# Patient Record
Sex: FEMALE | Race: WHITE | Employment: OTHER | URBAN - NONMETROPOLITAN AREA
[De-identification: names, ages, dates, MRNs, and addresses within clinical notes are randomized per-mention and may not be internally consistent; named-entity substitution may affect disease eponyms.]

---

## 2019-01-01 ENCOUNTER — APPOINTMENT (OUTPATIENT)
Dept: GENERAL RADIOLOGY | Age: 64
DRG: 207 | End: 2019-01-01
Attending: FAMILY MEDICINE
Payer: MEDICARE

## 2019-01-01 ENCOUNTER — APPOINTMENT (OUTPATIENT)
Dept: CT IMAGING | Age: 64
DRG: 207 | End: 2019-01-01
Attending: FAMILY MEDICINE
Payer: MEDICARE

## 2019-01-01 ENCOUNTER — HOSPITAL ENCOUNTER (INPATIENT)
Age: 64
LOS: 15 days | Discharge: HOSPICE | DRG: 207 | End: 2019-06-01
Attending: FAMILY MEDICINE | Admitting: FAMILY MEDICINE
Payer: MEDICARE

## 2019-01-01 VITALS
RESPIRATION RATE: 22 BRPM | TEMPERATURE: 99.7 F | HEART RATE: 91 BPM | BODY MASS INDEX: 32.55 KG/M2 | OXYGEN SATURATION: 94 % | WEIGHT: 172.4 LBS | DIASTOLIC BLOOD PRESSURE: 71 MMHG | SYSTOLIC BLOOD PRESSURE: 114 MMHG | HEIGHT: 61 IN

## 2019-01-01 DIAGNOSIS — F51.01 PRIMARY INSOMNIA: ICD-10-CM

## 2019-01-01 DIAGNOSIS — Z51.5 PALLIATIVE CARE PATIENT: Primary | ICD-10-CM

## 2019-01-01 LAB
ALBUMIN SERPL-MCNC: 3.1 G/DL (ref 3.5–5.2)
ALP BLD-CCNC: 66 U/L (ref 35–104)
ALPHA-1 ANTITRYPSIN PHENOTYPE: NORMAL
ALPHA-1 ANTITRYPSIN: 149 MG/DL (ref 90–200)
ALT SERPL-CCNC: 8 U/L (ref 5–33)
ANA IGG, ELISA: NORMAL
ANCA IFA: NORMAL
ANION GAP SERPL CALCULATED.3IONS-SCNC: 10 MMOL/L (ref 7–19)
ANION GAP SERPL CALCULATED.3IONS-SCNC: 11 MMOL/L (ref 7–19)
ANION GAP SERPL CALCULATED.3IONS-SCNC: 13 MMOL/L (ref 7–19)
ANION GAP SERPL CALCULATED.3IONS-SCNC: 4 MMOL/L (ref 7–19)
ANION GAP SERPL CALCULATED.3IONS-SCNC: 5 MMOL/L (ref 7–19)
ANION GAP SERPL CALCULATED.3IONS-SCNC: 7 MMOL/L (ref 7–19)
ANION GAP SERPL CALCULATED.3IONS-SCNC: 8 MMOL/L (ref 7–19)
ANION GAP SERPL CALCULATED.3IONS-SCNC: 8 MMOL/L (ref 7–19)
ANION GAP SERPL CALCULATED.3IONS-SCNC: 9 MMOL/L (ref 7–19)
AST SERPL-CCNC: 21 U/L (ref 5–32)
BASE EXCESS ARTERIAL: 1.3 MMOL/L (ref -2–2)
BASE EXCESS ARTERIAL: 10.4 MMOL/L (ref -2–2)
BASE EXCESS ARTERIAL: 10.6 MMOL/L (ref -2–2)
BASE EXCESS ARTERIAL: 11.6 MMOL/L (ref -2–2)
BASE EXCESS ARTERIAL: 3.9 MMOL/L (ref -2–2)
BASE EXCESS ARTERIAL: 4.4 MMOL/L (ref -2–2)
BASE EXCESS ARTERIAL: 6.5 MMOL/L (ref -2–2)
BASE EXCESS ARTERIAL: 7.2 MMOL/L (ref -2–2)
BASE EXCESS ARTERIAL: 8.5 MMOL/L (ref -2–2)
BASE EXCESS ARTERIAL: 9.6 MMOL/L (ref -2–2)
BASOPHILS ABSOLUTE: 0 K/UL (ref 0–0.2)
BASOPHILS ABSOLUTE: 0.1 K/UL (ref 0–0.2)
BASOPHILS RELATIVE PERCENT: 0.1 % (ref 0–1)
BASOPHILS RELATIVE PERCENT: 0.2 % (ref 0–1)
BASOPHILS RELATIVE PERCENT: 0.2 % (ref 0–1)
BASOPHILS RELATIVE PERCENT: 0.3 % (ref 0–1)
BASOPHILS RELATIVE PERCENT: 0.4 % (ref 0–1)
BASOPHILS RELATIVE PERCENT: 0.5 % (ref 0–1)
BASOPHILS RELATIVE PERCENT: 0.6 % (ref 0–1)
BILIRUB SERPL-MCNC: 1 MG/DL (ref 0.2–1.2)
BLOOD CULTURE, ROUTINE: ABNORMAL
BLOOD CULTURE, ROUTINE: ABNORMAL
BLOOD CULTURE, ROUTINE: NORMAL
BLOOD CULTURE, ROUTINE: NORMAL
BUN BLDV-MCNC: 16 MG/DL (ref 8–23)
BUN BLDV-MCNC: 17 MG/DL (ref 8–23)
BUN BLDV-MCNC: 17 MG/DL (ref 8–23)
BUN BLDV-MCNC: 20 MG/DL (ref 8–23)
BUN BLDV-MCNC: 21 MG/DL (ref 8–23)
BUN BLDV-MCNC: 23 MG/DL (ref 8–23)
BUN BLDV-MCNC: 24 MG/DL (ref 8–23)
BUN BLDV-MCNC: 26 MG/DL (ref 8–23)
CALCIUM SERPL-MCNC: 7.9 MG/DL (ref 8.8–10.2)
CALCIUM SERPL-MCNC: 8 MG/DL (ref 8.8–10.2)
CALCIUM SERPL-MCNC: 8 MG/DL (ref 8.8–10.2)
CALCIUM SERPL-MCNC: 8.1 MG/DL (ref 8.8–10.2)
CALCIUM SERPL-MCNC: 8.2 MG/DL (ref 8.8–10.2)
CALCIUM SERPL-MCNC: 8.3 MG/DL (ref 8.8–10.2)
CALCIUM SERPL-MCNC: 8.4 MG/DL (ref 8.8–10.2)
CALCIUM SERPL-MCNC: 8.5 MG/DL (ref 8.8–10.2)
CALCIUM SERPL-MCNC: 8.5 MG/DL (ref 8.8–10.2)
CALCIUM SERPL-MCNC: 8.6 MG/DL (ref 8.8–10.2)
CARBOXYHEMOGLOBIN ARTERIAL: 1.4 % (ref 0–5)
CARBOXYHEMOGLOBIN ARTERIAL: 1.5 % (ref 0–5)
CARBOXYHEMOGLOBIN ARTERIAL: 1.6 % (ref 0–5)
CARBOXYHEMOGLOBIN ARTERIAL: 1.7 % (ref 0–5)
CARBOXYHEMOGLOBIN ARTERIAL: 1.8 % (ref 0–5)
CARBOXYHEMOGLOBIN ARTERIAL: 1.8 % (ref 0–5)
CARBOXYHEMOGLOBIN ARTERIAL: 2.6 % (ref 0–5)
CARBOXYHEMOGLOBIN ARTERIAL: 2.6 % (ref 0–5)
CCP IGG ANTIBODIES: 3 UNITS (ref 0–19)
CHLORIDE BLD-SCNC: 101 MMOL/L (ref 98–111)
CHLORIDE BLD-SCNC: 102 MMOL/L (ref 98–111)
CHLORIDE BLD-SCNC: 103 MMOL/L (ref 98–111)
CHLORIDE BLD-SCNC: 103 MMOL/L (ref 98–111)
CHLORIDE BLD-SCNC: 104 MMOL/L (ref 98–111)
CHLORIDE BLD-SCNC: 105 MMOL/L (ref 98–111)
CHLORIDE BLD-SCNC: 107 MMOL/L (ref 98–111)
CHLORIDE BLD-SCNC: 108 MMOL/L (ref 98–111)
CHLORIDE BLD-SCNC: 108 MMOL/L (ref 98–111)
CHLORIDE BLD-SCNC: 109 MMOL/L (ref 98–111)
CHLORIDE BLD-SCNC: 111 MMOL/L (ref 98–111)
CO2: 25 MMOL/L (ref 22–29)
CO2: 26 MMOL/L (ref 22–29)
CO2: 26 MMOL/L (ref 22–29)
CO2: 27 MMOL/L (ref 22–29)
CO2: 28 MMOL/L (ref 22–29)
CO2: 29 MMOL/L (ref 22–29)
CO2: 30 MMOL/L (ref 22–29)
CO2: 31 MMOL/L (ref 22–29)
CO2: 31 MMOL/L (ref 22–29)
CO2: 32 MMOL/L (ref 22–29)
CO2: 32 MMOL/L (ref 22–29)
CO2: 33 MMOL/L (ref 22–29)
CORTISOL TOTAL: 30.7 UG/DL
CREAT SERPL-MCNC: 0.5 MG/DL (ref 0.5–0.9)
CREAT SERPL-MCNC: 0.6 MG/DL (ref 0.5–0.9)
CREAT SERPL-MCNC: 0.7 MG/DL (ref 0.5–0.9)
CREAT SERPL-MCNC: 0.8 MG/DL (ref 0.5–0.9)
CULTURE, BLOOD 2: ABNORMAL
CULTURE, BLOOD 2: ABNORMAL
CULTURE, BLOOD 2: NORMAL
CULTURE, BLOOD 2: NORMAL
CULTURE, RESPIRATORY: NORMAL
EOSINOPHILS ABSOLUTE: 0 K/UL (ref 0–0.6)
EOSINOPHILS ABSOLUTE: 0.1 K/UL (ref 0–0.6)
EOSINOPHILS ABSOLUTE: 0.2 K/UL (ref 0–0.6)
EOSINOPHILS RELATIVE PERCENT: 0 % (ref 0–5)
EOSINOPHILS RELATIVE PERCENT: 0.1 % (ref 0–5)
EOSINOPHILS RELATIVE PERCENT: 0.2 % (ref 0–5)
EOSINOPHILS RELATIVE PERCENT: 0.3 % (ref 0–5)
EOSINOPHILS RELATIVE PERCENT: 0.5 % (ref 0–5)
EOSINOPHILS RELATIVE PERCENT: 0.5 % (ref 0–5)
EOSINOPHILS RELATIVE PERCENT: 1.2 % (ref 0–5)
GFR NON-AFRICAN AMERICAN: >60
GLUCOSE BLD-MCNC: 112 MG/DL (ref 74–109)
GLUCOSE BLD-MCNC: 113 MG/DL (ref 74–109)
GLUCOSE BLD-MCNC: 114 MG/DL (ref 74–109)
GLUCOSE BLD-MCNC: 115 MG/DL (ref 74–109)
GLUCOSE BLD-MCNC: 118 MG/DL (ref 74–109)
GLUCOSE BLD-MCNC: 120 MG/DL (ref 74–109)
GLUCOSE BLD-MCNC: 123 MG/DL (ref 74–109)
GLUCOSE BLD-MCNC: 124 MG/DL (ref 74–109)
GLUCOSE BLD-MCNC: 126 MG/DL (ref 74–109)
GLUCOSE BLD-MCNC: 132 MG/DL (ref 74–109)
GLUCOSE BLD-MCNC: 134 MG/DL (ref 74–109)
GLUCOSE BLD-MCNC: 144 MG/DL (ref 74–109)
GLUCOSE BLD-MCNC: 146 MG/DL (ref 74–109)
GLUCOSE BLD-MCNC: 84 MG/DL (ref 74–109)
GLUCOSE BLD-MCNC: 99 MG/DL (ref 74–109)
GRAM STAIN RESULT: NORMAL
HCO3 ARTERIAL: 26.6 MMOL/L (ref 22–26)
HCO3 ARTERIAL: 28.5 MMOL/L (ref 22–26)
HCO3 ARTERIAL: 29.8 MMOL/L (ref 22–26)
HCO3 ARTERIAL: 31.2 MMOL/L (ref 22–26)
HCO3 ARTERIAL: 31.3 MMOL/L (ref 22–26)
HCO3 ARTERIAL: 34 MMOL/L (ref 22–26)
HCO3 ARTERIAL: 34.3 MMOL/L (ref 22–26)
HCO3 ARTERIAL: 34.3 MMOL/L (ref 22–26)
HCO3 ARTERIAL: 37 MMOL/L (ref 22–26)
HCO3 ARTERIAL: 37.1 MMOL/L (ref 22–26)
HCT VFR BLD CALC: 32.2 % (ref 37–47)
HCT VFR BLD CALC: 32.4 % (ref 37–47)
HCT VFR BLD CALC: 32.7 % (ref 37–47)
HCT VFR BLD CALC: 32.9 % (ref 37–47)
HCT VFR BLD CALC: 33.9 % (ref 37–47)
HCT VFR BLD CALC: 35.1 % (ref 37–47)
HCT VFR BLD CALC: 35.2 % (ref 37–47)
HCT VFR BLD CALC: 35.3 % (ref 37–47)
HCT VFR BLD CALC: 35.4 % (ref 37–47)
HCT VFR BLD CALC: 35.5 % (ref 37–47)
HCT VFR BLD CALC: 35.8 % (ref 37–47)
HCT VFR BLD CALC: 36 % (ref 37–47)
HCT VFR BLD CALC: 37.4 % (ref 37–47)
HCT VFR BLD CALC: 38 % (ref 37–47)
HEMOGLOBIN, ART, EXTENDED: 11.5 G/DL (ref 12–16)
HEMOGLOBIN, ART, EXTENDED: 11.5 G/DL (ref 12–16)
HEMOGLOBIN, ART, EXTENDED: 11.6 G/DL (ref 12–16)
HEMOGLOBIN, ART, EXTENDED: 11.7 G/DL (ref 12–16)
HEMOGLOBIN, ART, EXTENDED: 11.8 G/DL (ref 12–16)
HEMOGLOBIN, ART, EXTENDED: 11.9 G/DL (ref 12–16)
HEMOGLOBIN, ART, EXTENDED: 12 G/DL (ref 12–16)
HEMOGLOBIN, ART, EXTENDED: 12.1 G/DL (ref 12–16)
HEMOGLOBIN, ART, EXTENDED: 12.3 G/DL (ref 12–16)
HEMOGLOBIN, ART, EXTENDED: 12.5 G/DL (ref 12–16)
HEMOGLOBIN: 10.1 G/DL (ref 12–16)
HEMOGLOBIN: 10.3 G/DL (ref 12–16)
HEMOGLOBIN: 10.4 G/DL (ref 12–16)
HEMOGLOBIN: 10.6 G/DL (ref 12–16)
HEMOGLOBIN: 11 G/DL (ref 12–16)
HEMOGLOBIN: 11 G/DL (ref 12–16)
HEMOGLOBIN: 11.1 G/DL (ref 12–16)
HEMOGLOBIN: 11.2 G/DL (ref 12–16)
HEMOGLOBIN: 11.3 G/DL (ref 12–16)
HEMOGLOBIN: 11.6 G/DL (ref 12–16)
HEMOGLOBIN: 11.8 G/DL (ref 12–16)
HEMOGLOBIN: 9.9 G/DL (ref 12–16)
IGA: 172 MG/DL (ref 70–400)
IGE: 4 KU/L
IGG 1: 420 MG/DL (ref 240–1118)
IGG 2: 136 MG/DL (ref 124–549)
IGG 3: 32 MG/DL (ref 21–134)
IGG 4: 31 MG/DL (ref 1–123)
IGG: 593 MG/DL (ref 700–1600)
IGM: 70 MG/DL (ref 40–230)
LACTIC ACID, SEPSIS: 1.2 MG/DL (ref 0.5–1.9)
LV EF: 58 %
LVEF MODALITY: NORMAL
LYMPHOCYTES ABSOLUTE: 0.3 K/UL (ref 1.1–4.5)
LYMPHOCYTES ABSOLUTE: 0.3 K/UL (ref 1.1–4.5)
LYMPHOCYTES ABSOLUTE: 0.4 K/UL (ref 1.1–4.5)
LYMPHOCYTES ABSOLUTE: 0.5 K/UL (ref 1.1–4.5)
LYMPHOCYTES ABSOLUTE: 0.5 K/UL (ref 1.1–4.5)
LYMPHOCYTES ABSOLUTE: 0.6 K/UL (ref 1.1–4.5)
LYMPHOCYTES ABSOLUTE: 0.7 K/UL (ref 1.1–4.5)
LYMPHOCYTES ABSOLUTE: 0.7 K/UL (ref 1.1–4.5)
LYMPHOCYTES ABSOLUTE: 1 K/UL (ref 1.1–4.5)
LYMPHOCYTES ABSOLUTE: 1.4 K/UL (ref 1.1–4.5)
LYMPHOCYTES RELATIVE PERCENT: 11.5 % (ref 20–40)
LYMPHOCYTES RELATIVE PERCENT: 2.3 % (ref 20–40)
LYMPHOCYTES RELATIVE PERCENT: 2.9 % (ref 20–40)
LYMPHOCYTES RELATIVE PERCENT: 3.4 % (ref 20–40)
LYMPHOCYTES RELATIVE PERCENT: 4.3 % (ref 20–40)
LYMPHOCYTES RELATIVE PERCENT: 4.3 % (ref 20–40)
LYMPHOCYTES RELATIVE PERCENT: 4.5 % (ref 20–40)
LYMPHOCYTES RELATIVE PERCENT: 5 % (ref 20–40)
LYMPHOCYTES RELATIVE PERCENT: 5.2 % (ref 20–40)
LYMPHOCYTES RELATIVE PERCENT: 5.4 % (ref 20–40)
LYMPHOCYTES RELATIVE PERCENT: 5.9 % (ref 20–40)
LYMPHOCYTES RELATIVE PERCENT: 6.2 % (ref 20–40)
LYMPHOCYTES RELATIVE PERCENT: 6.7 % (ref 20–40)
LYMPHOCYTES RELATIVE PERCENT: 7.4 % (ref 20–40)
MAGNESIUM: 2.5 MG/DL (ref 1.6–2.4)
MAGNESIUM: 2.7 MG/DL (ref 1.6–2.4)
MCH RBC QN AUTO: 29 PG (ref 27–31)
MCH RBC QN AUTO: 29.1 PG (ref 27–31)
MCH RBC QN AUTO: 29.2 PG (ref 27–31)
MCH RBC QN AUTO: 29.4 PG (ref 27–31)
MCH RBC QN AUTO: 29.4 PG (ref 27–31)
MCH RBC QN AUTO: 29.5 PG (ref 27–31)
MCH RBC QN AUTO: 29.7 PG (ref 27–31)
MCH RBC QN AUTO: 29.8 PG (ref 27–31)
MCH RBC QN AUTO: 29.9 PG (ref 27–31)
MCH RBC QN AUTO: 30 PG (ref 27–31)
MCH RBC QN AUTO: 30.1 PG (ref 27–31)
MCH RBC QN AUTO: 30.4 PG (ref 27–31)
MCHC RBC AUTO-ENTMCNC: 30.7 G/DL (ref 33–37)
MCHC RBC AUTO-ENTMCNC: 30.7 G/DL (ref 33–37)
MCHC RBC AUTO-ENTMCNC: 30.9 G/DL (ref 33–37)
MCHC RBC AUTO-ENTMCNC: 31 G/DL (ref 33–37)
MCHC RBC AUTO-ENTMCNC: 31.1 G/DL (ref 33–37)
MCHC RBC AUTO-ENTMCNC: 31.1 G/DL (ref 33–37)
MCHC RBC AUTO-ENTMCNC: 31.3 G/DL (ref 33–37)
MCHC RBC AUTO-ENTMCNC: 31.3 G/DL (ref 33–37)
MCHC RBC AUTO-ENTMCNC: 31.4 G/DL (ref 33–37)
MCHC RBC AUTO-ENTMCNC: 31.5 G/DL (ref 33–37)
MCHC RBC AUTO-ENTMCNC: 31.6 G/DL (ref 33–37)
MCHC RBC AUTO-ENTMCNC: 31.6 G/DL (ref 33–37)
MCHC RBC AUTO-ENTMCNC: 31.8 G/DL (ref 33–37)
MCHC RBC AUTO-ENTMCNC: 32.1 G/DL (ref 33–37)
MCV RBC AUTO: 93.5 FL (ref 81–99)
MCV RBC AUTO: 93.6 FL (ref 81–99)
MCV RBC AUTO: 93.6 FL (ref 81–99)
MCV RBC AUTO: 93.9 FL (ref 81–99)
MCV RBC AUTO: 94 FL (ref 81–99)
MCV RBC AUTO: 94.3 FL (ref 81–99)
MCV RBC AUTO: 94.4 FL (ref 81–99)
MCV RBC AUTO: 94.9 FL (ref 81–99)
MCV RBC AUTO: 94.9 FL (ref 81–99)
MCV RBC AUTO: 95.1 FL (ref 81–99)
MCV RBC AUTO: 95.1 FL (ref 81–99)
MCV RBC AUTO: 95.6 FL (ref 81–99)
MCV RBC AUTO: 95.8 FL (ref 81–99)
MCV RBC AUTO: 97 FL (ref 81–99)
METHEMOGLOBIN ARTERIAL: 0.8 %
METHEMOGLOBIN ARTERIAL: 0.9 %
METHEMOGLOBIN ARTERIAL: 1 %
METHEMOGLOBIN ARTERIAL: 1.1 %
METHEMOGLOBIN ARTERIAL: 1.2 %
METHEMOGLOBIN ARTERIAL: 1.3 %
METHEMOGLOBIN ARTERIAL: 1.4 %
MONOCYTES ABSOLUTE: 0.2 K/UL (ref 0–0.9)
MONOCYTES ABSOLUTE: 0.5 K/UL (ref 0–0.9)
MONOCYTES ABSOLUTE: 0.5 K/UL (ref 0–0.9)
MONOCYTES ABSOLUTE: 0.6 K/UL (ref 0–0.9)
MONOCYTES ABSOLUTE: 0.7 K/UL (ref 0–0.9)
MONOCYTES ABSOLUTE: 0.7 K/UL (ref 0–0.9)
MONOCYTES ABSOLUTE: 0.8 K/UL (ref 0–0.9)
MONOCYTES ABSOLUTE: 0.9 K/UL (ref 0–0.9)
MONOCYTES ABSOLUTE: 1.1 K/UL (ref 0–0.9)
MONOCYTES RELATIVE PERCENT: 2.5 % (ref 0–10)
MONOCYTES RELATIVE PERCENT: 4.6 % (ref 0–10)
MONOCYTES RELATIVE PERCENT: 4.8 % (ref 0–10)
MONOCYTES RELATIVE PERCENT: 5.9 % (ref 0–10)
MONOCYTES RELATIVE PERCENT: 5.9 % (ref 0–10)
MONOCYTES RELATIVE PERCENT: 6 % (ref 0–10)
MONOCYTES RELATIVE PERCENT: 6.4 % (ref 0–10)
MONOCYTES RELATIVE PERCENT: 6.6 % (ref 0–10)
MONOCYTES RELATIVE PERCENT: 7 % (ref 0–10)
MONOCYTES RELATIVE PERCENT: 7.3 % (ref 0–10)
MONOCYTES RELATIVE PERCENT: 8.2 % (ref 0–10)
MONOCYTES RELATIVE PERCENT: 9.8 % (ref 0–10)
NEUTROPHILS ABSOLUTE: 10.5 K/UL (ref 1.5–7.5)
NEUTROPHILS ABSOLUTE: 10.9 K/UL (ref 1.5–7.5)
NEUTROPHILS ABSOLUTE: 11.5 K/UL (ref 1.5–7.5)
NEUTROPHILS ABSOLUTE: 12 K/UL (ref 1.5–7.5)
NEUTROPHILS ABSOLUTE: 5.3 K/UL (ref 1.5–7.5)
NEUTROPHILS ABSOLUTE: 6.5 K/UL (ref 1.5–7.5)
NEUTROPHILS ABSOLUTE: 6.6 K/UL (ref 1.5–7.5)
NEUTROPHILS ABSOLUTE: 7.4 K/UL (ref 1.5–7.5)
NEUTROPHILS ABSOLUTE: 7.6 K/UL (ref 1.5–7.5)
NEUTROPHILS ABSOLUTE: 7.9 K/UL (ref 1.5–7.5)
NEUTROPHILS ABSOLUTE: 9 K/UL (ref 1.5–7.5)
NEUTROPHILS ABSOLUTE: 9.2 K/UL (ref 1.5–7.5)
NEUTROPHILS ABSOLUTE: 9.3 K/UL (ref 1.5–7.5)
NEUTROPHILS ABSOLUTE: 9.7 K/UL (ref 1.5–7.5)
NEUTROPHILS RELATIVE PERCENT: 74.5 % (ref 50–65)
NEUTROPHILS RELATIVE PERCENT: 75.6 % (ref 50–65)
NEUTROPHILS RELATIVE PERCENT: 77.2 % (ref 50–65)
NEUTROPHILS RELATIVE PERCENT: 79.3 % (ref 50–65)
NEUTROPHILS RELATIVE PERCENT: 79.6 % (ref 50–65)
NEUTROPHILS RELATIVE PERCENT: 79.9 % (ref 50–65)
NEUTROPHILS RELATIVE PERCENT: 82 % (ref 50–65)
NEUTROPHILS RELATIVE PERCENT: 82.6 % (ref 50–65)
NEUTROPHILS RELATIVE PERCENT: 84.9 % (ref 50–65)
NEUTROPHILS RELATIVE PERCENT: 86.4 % (ref 50–65)
NEUTROPHILS RELATIVE PERCENT: 87.7 % (ref 50–65)
NEUTROPHILS RELATIVE PERCENT: 88.8 % (ref 50–65)
NEUTROPHILS RELATIVE PERCENT: 90.4 % (ref 50–65)
NEUTROPHILS RELATIVE PERCENT: 91.7 % (ref 50–65)
O2 CONTENT ARTERIAL: 14 ML/DL
O2 CONTENT ARTERIAL: 14.7 ML/DL
O2 CONTENT ARTERIAL: 14.8 ML/DL
O2 CONTENT ARTERIAL: 15.6 ML/DL
O2 CONTENT ARTERIAL: 15.6 ML/DL
O2 CONTENT ARTERIAL: 15.7 ML/DL
O2 CONTENT ARTERIAL: 15.7 ML/DL
O2 CONTENT ARTERIAL: 16 ML/DL
O2 CONTENT ARTERIAL: 16 ML/DL
O2 CONTENT ARTERIAL: 17 ML/DL
O2 SAT, ARTERIAL: 85.4 %
O2 SAT, ARTERIAL: 87.6 %
O2 SAT, ARTERIAL: 91.1 %
O2 SAT, ARTERIAL: 91.1 %
O2 SAT, ARTERIAL: 92.4 %
O2 SAT, ARTERIAL: 94.4 %
O2 SAT, ARTERIAL: 94.9 %
O2 SAT, ARTERIAL: 95.3 %
O2 SAT, ARTERIAL: 95.5 %
O2 SAT, ARTERIAL: 96.8 %
O2 THERAPY: ABNORMAL
ORGANISM: ABNORMAL
ORGANISM: ABNORMAL
PCO2 ARTERIAL: 41 MMHG (ref 35–45)
PCO2 ARTERIAL: 42 MMHG (ref 35–45)
PCO2 ARTERIAL: 42 MMHG (ref 35–45)
PCO2 ARTERIAL: 44 MMHG (ref 35–45)
PCO2 ARTERIAL: 45 MMHG (ref 35–45)
PCO2 ARTERIAL: 46 MMHG (ref 35–45)
PCO2 ARTERIAL: 47 MMHG (ref 35–45)
PCO2 ARTERIAL: 50 MMHG (ref 35–45)
PCO2 ARTERIAL: 51 MMHG (ref 35–45)
PCO2 ARTERIAL: 57 MMHG (ref 35–45)
PDW BLD-RTO: 15 % (ref 11.5–14.5)
PDW BLD-RTO: 15.1 % (ref 11.5–14.5)
PDW BLD-RTO: 15.2 % (ref 11.5–14.5)
PDW BLD-RTO: 15.3 % (ref 11.5–14.5)
PDW BLD-RTO: 15.4 % (ref 11.5–14.5)
PDW BLD-RTO: 15.5 % (ref 11.5–14.5)
PDW BLD-RTO: 15.7 % (ref 11.5–14.5)
PH ARTERIAL: 7.39 (ref 7.35–7.45)
PH ARTERIAL: 7.41 (ref 7.35–7.45)
PH ARTERIAL: 7.42 (ref 7.35–7.45)
PH ARTERIAL: 7.44 (ref 7.35–7.45)
PH ARTERIAL: 7.44 (ref 7.35–7.45)
PH ARTERIAL: 7.45 (ref 7.35–7.45)
PH ARTERIAL: 7.47 (ref 7.35–7.45)
PH ARTERIAL: 7.48 (ref 7.35–7.45)
PH ARTERIAL: 7.49 (ref 7.35–7.45)
PH ARTERIAL: 7.52 (ref 7.35–7.45)
PLATELET # BLD: 137 K/UL (ref 130–400)
PLATELET # BLD: 149 K/UL (ref 130–400)
PLATELET # BLD: 158 K/UL (ref 130–400)
PLATELET # BLD: 166 K/UL (ref 130–400)
PLATELET # BLD: 166 K/UL (ref 130–400)
PLATELET # BLD: 168 K/UL (ref 130–400)
PLATELET # BLD: 172 K/UL (ref 130–400)
PLATELET # BLD: 179 K/UL (ref 130–400)
PLATELET # BLD: 190 K/UL (ref 130–400)
PLATELET # BLD: 192 K/UL (ref 130–400)
PLATELET # BLD: 202 K/UL (ref 130–400)
PLATELET # BLD: 206 K/UL (ref 130–400)
PLATELET # BLD: 206 K/UL (ref 130–400)
PLATELET # BLD: 207 K/UL (ref 130–400)
PMV BLD AUTO: 10.6 FL (ref 9.4–12.3)
PMV BLD AUTO: 10.6 FL (ref 9.4–12.3)
PMV BLD AUTO: 10.7 FL (ref 9.4–12.3)
PMV BLD AUTO: 10.8 FL (ref 9.4–12.3)
PMV BLD AUTO: 10.9 FL (ref 9.4–12.3)
PMV BLD AUTO: 10.9 FL (ref 9.4–12.3)
PMV BLD AUTO: 11 FL (ref 9.4–12.3)
PMV BLD AUTO: 11 FL (ref 9.4–12.3)
PMV BLD AUTO: 11.1 FL (ref 9.4–12.3)
PMV BLD AUTO: 11.1 FL (ref 9.4–12.3)
PMV BLD AUTO: 11.2 FL (ref 9.4–12.3)
PMV BLD AUTO: 11.5 FL (ref 9.4–12.3)
PMV BLD AUTO: 11.6 FL (ref 9.4–12.3)
PMV BLD AUTO: 11.8 FL (ref 9.4–12.3)
PO2 ARTERIAL: 129 MMHG (ref 80–100)
PO2 ARTERIAL: 47 MMHG (ref 80–100)
PO2 ARTERIAL: 52 MMHG (ref 80–100)
PO2 ARTERIAL: 56 MMHG (ref 80–100)
PO2 ARTERIAL: 57 MMHG (ref 80–100)
PO2 ARTERIAL: 64 MMHG (ref 80–100)
PO2 ARTERIAL: 69 MMHG (ref 80–100)
PO2 ARTERIAL: 82 MMHG (ref 80–100)
PO2 ARTERIAL: 84 MMHG (ref 80–100)
PO2 ARTERIAL: 95 MMHG (ref 80–100)
POTASSIUM REFLEX MAGNESIUM: 3.3 MMOL/L (ref 3.5–5)
POTASSIUM REFLEX MAGNESIUM: 3.4 MMOL/L (ref 3.5–5)
POTASSIUM REFLEX MAGNESIUM: 3.8 MMOL/L (ref 3.5–5)
POTASSIUM REFLEX MAGNESIUM: 4 MMOL/L (ref 3.5–5)
POTASSIUM REFLEX MAGNESIUM: 4.1 MMOL/L (ref 3.5–5)
POTASSIUM REFLEX MAGNESIUM: 4.1 MMOL/L (ref 3.5–5)
POTASSIUM REFLEX MAGNESIUM: 4.2 MMOL/L (ref 3.5–5)
POTASSIUM REFLEX MAGNESIUM: 4.3 MMOL/L (ref 3.5–5)
POTASSIUM REFLEX MAGNESIUM: 4.5 MMOL/L (ref 3.5–5)
POTASSIUM REFLEX MAGNESIUM: 4.5 MMOL/L (ref 3.5–5)
POTASSIUM REFLEX MAGNESIUM: 4.8 MMOL/L (ref 3.5–5)
POTASSIUM REFLEX MAGNESIUM: 4.8 MMOL/L (ref 3.5–5)
POTASSIUM REFLEX MAGNESIUM: 5 MMOL/L (ref 3.5–5)
POTASSIUM, WHOLE BLOOD: 3.1
POTASSIUM, WHOLE BLOOD: 3.2
POTASSIUM, WHOLE BLOOD: 3.7
POTASSIUM, WHOLE BLOOD: 4
POTASSIUM, WHOLE BLOOD: 4
POTASSIUM, WHOLE BLOOD: 4.1
POTASSIUM, WHOLE BLOOD: 4.4
POTASSIUM, WHOLE BLOOD: 4.6
POTASSIUM, WHOLE BLOOD: 4.6
POTASSIUM, WHOLE BLOOD: 4.7
PRO-BNP: 1436 PG/ML (ref 0–900)
RBC # BLD: 3.36 M/UL (ref 4.2–5.4)
RBC # BLD: 3.39 M/UL (ref 4.2–5.4)
RBC # BLD: 3.44 M/UL (ref 4.2–5.4)
RBC # BLD: 3.49 M/UL (ref 4.2–5.4)
RBC # BLD: 3.61 M/UL (ref 4.2–5.4)
RBC # BLD: 3.69 M/UL (ref 4.2–5.4)
RBC # BLD: 3.69 M/UL (ref 4.2–5.4)
RBC # BLD: 3.73 M/UL (ref 4.2–5.4)
RBC # BLD: 3.74 M/UL (ref 4.2–5.4)
RBC # BLD: 3.74 M/UL (ref 4.2–5.4)
RBC # BLD: 3.76 M/UL (ref 4.2–5.4)
RBC # BLD: 3.83 M/UL (ref 4.2–5.4)
RBC # BLD: 4 M/UL (ref 4.2–5.4)
RBC # BLD: 4.06 M/UL (ref 4.2–5.4)
SODIUM BLD-SCNC: 138 MMOL/L (ref 136–145)
SODIUM BLD-SCNC: 139 MMOL/L (ref 136–145)
SODIUM BLD-SCNC: 139 MMOL/L (ref 136–145)
SODIUM BLD-SCNC: 140 MMOL/L (ref 136–145)
SODIUM BLD-SCNC: 142 MMOL/L (ref 136–145)
SODIUM BLD-SCNC: 144 MMOL/L (ref 136–145)
SODIUM BLD-SCNC: 150 MMOL/L (ref 136–145)
SODIUM BLD-SCNC: 152 MMOL/L (ref 136–145)
TOTAL PROTEIN: 6.2 G/DL (ref 6.6–8.7)
VANCOMYCIN TROUGH: 17.2 UG/ML (ref 10–20)
VANCOMYCIN TROUGH: 18.8 UG/ML (ref 10–20)
WBC # BLD: 10.3 K/UL (ref 4.8–10.8)
WBC # BLD: 11 K/UL (ref 4.8–10.8)
WBC # BLD: 11.2 K/UL (ref 4.8–10.8)
WBC # BLD: 11.3 K/UL (ref 4.8–10.8)
WBC # BLD: 12.2 K/UL (ref 4.8–10.8)
WBC # BLD: 12.4 K/UL (ref 4.8–10.8)
WBC # BLD: 12.5 K/UL (ref 4.8–10.8)
WBC # BLD: 13.2 K/UL (ref 4.8–10.8)
WBC # BLD: 13.5 K/UL (ref 4.8–10.8)
WBC # BLD: 6.4 K/UL (ref 4.8–10.8)
WBC # BLD: 8.3 K/UL (ref 4.8–10.8)
WBC # BLD: 8.5 K/UL (ref 4.8–10.8)
WBC # BLD: 8.7 K/UL (ref 4.8–10.8)
WBC # BLD: 9.1 K/UL (ref 4.8–10.8)

## 2019-01-01 PROCEDURE — 94660 CPAP INITIATION&MGMT: CPT

## 2019-01-01 PROCEDURE — 2700000000 HC OXYGEN THERAPY PER DAY

## 2019-01-01 PROCEDURE — 2580000003 HC RX 258: Performed by: INTERNAL MEDICINE

## 2019-01-01 PROCEDURE — 71045 X-RAY EXAM CHEST 1 VIEW: CPT

## 2019-01-01 PROCEDURE — 2140000000 HC CCU INTERMEDIATE R&B

## 2019-01-01 PROCEDURE — 80048 BASIC METABOLIC PNL TOTAL CA: CPT

## 2019-01-01 PROCEDURE — 6370000000 HC RX 637 (ALT 250 FOR IP): Performed by: INTERNAL MEDICINE

## 2019-01-01 PROCEDURE — 82803 BLOOD GASES ANY COMBINATION: CPT

## 2019-01-01 PROCEDURE — 6370000000 HC RX 637 (ALT 250 FOR IP): Performed by: NURSE PRACTITIONER

## 2019-01-01 PROCEDURE — 84132 ASSAY OF SERUM POTASSIUM: CPT

## 2019-01-01 PROCEDURE — 2580000003 HC RX 258: Performed by: FAMILY MEDICINE

## 2019-01-01 PROCEDURE — 6360000002 HC RX W HCPCS: Performed by: FAMILY MEDICINE

## 2019-01-01 PROCEDURE — 99233 SBSQ HOSP IP/OBS HIGH 50: CPT | Performed by: INTERNAL MEDICINE

## 2019-01-01 PROCEDURE — 36600 WITHDRAWAL OF ARTERIAL BLOOD: CPT

## 2019-01-01 PROCEDURE — 36591 DRAW BLOOD OFF VENOUS DEVICE: CPT

## 2019-01-01 PROCEDURE — 6360000002 HC RX W HCPCS: Performed by: NURSE PRACTITIONER

## 2019-01-01 PROCEDURE — 6360000002 HC RX W HCPCS: Performed by: INTERNAL MEDICINE

## 2019-01-01 PROCEDURE — 94640 AIRWAY INHALATION TREATMENT: CPT

## 2019-01-01 PROCEDURE — 6370000000 HC RX 637 (ALT 250 FOR IP): Performed by: FAMILY MEDICINE

## 2019-01-01 PROCEDURE — 2000000000 HC ICU R&B

## 2019-01-01 PROCEDURE — 94003 VENT MGMT INPAT SUBQ DAY: CPT

## 2019-01-01 PROCEDURE — 86200 CCP ANTIBODY: CPT

## 2019-01-01 PROCEDURE — 99232 SBSQ HOSP IP/OBS MODERATE 35: CPT | Performed by: FAMILY MEDICINE

## 2019-01-01 PROCEDURE — 99239 HOSP IP/OBS DSCHRG MGMT >30: CPT | Performed by: FAMILY MEDICINE

## 2019-01-01 PROCEDURE — 82103 ALPHA-1-ANTITRYPSIN TOTAL: CPT

## 2019-01-01 PROCEDURE — 2500000003 HC RX 250 WO HCPCS: Performed by: FAMILY MEDICINE

## 2019-01-01 PROCEDURE — 85025 COMPLETE CBC W/AUTO DIFF WBC: CPT

## 2019-01-01 PROCEDURE — 82533 TOTAL CORTISOL: CPT

## 2019-01-01 PROCEDURE — 87070 CULTURE OTHR SPECIMN AEROBIC: CPT

## 2019-01-01 PROCEDURE — 86038 ANTINUCLEAR ANTIBODIES: CPT

## 2019-01-01 PROCEDURE — 93306 TTE W/DOPPLER COMPLETE: CPT

## 2019-01-01 PROCEDURE — 2500000003 HC RX 250 WO HCPCS: Performed by: INTERNAL MEDICINE

## 2019-01-01 PROCEDURE — 71250 CT THORAX DX C-: CPT

## 2019-01-01 PROCEDURE — 36415 COLL VENOUS BLD VENIPUNCTURE: CPT

## 2019-01-01 PROCEDURE — 83735 ASSAY OF MAGNESIUM: CPT

## 2019-01-01 PROCEDURE — 82784 ASSAY IGA/IGD/IGG/IGM EACH: CPT

## 2019-01-01 PROCEDURE — 87206 SMEAR FLUORESCENT/ACID STAI: CPT

## 2019-01-01 PROCEDURE — 87040 BLOOD CULTURE FOR BACTERIA: CPT

## 2019-01-01 PROCEDURE — 0BH17EZ INSERTION OF ENDOTRACHEAL AIRWAY INTO TRACHEA, VIA NATURAL OR ARTIFICIAL OPENING: ICD-10-PCS | Performed by: FAMILY MEDICINE

## 2019-01-01 PROCEDURE — 87205 SMEAR GRAM STAIN: CPT

## 2019-01-01 PROCEDURE — 99291 CRITICAL CARE FIRST HOUR: CPT | Performed by: FAMILY MEDICINE

## 2019-01-01 PROCEDURE — 82785 ASSAY OF IGE: CPT

## 2019-01-01 PROCEDURE — 87102 FUNGUS ISOLATION CULTURE: CPT

## 2019-01-01 PROCEDURE — 87116 MYCOBACTERIA CULTURE: CPT

## 2019-01-01 PROCEDURE — 80053 COMPREHEN METABOLIC PANEL: CPT

## 2019-01-01 PROCEDURE — 83880 ASSAY OF NATRIURETIC PEPTIDE: CPT

## 2019-01-01 PROCEDURE — 51702 INSERT TEMP BLADDER CATH: CPT

## 2019-01-01 PROCEDURE — 83605 ASSAY OF LACTIC ACID: CPT

## 2019-01-01 PROCEDURE — 94002 VENT MGMT INPAT INIT DAY: CPT

## 2019-01-01 PROCEDURE — 82787 IGG 1 2 3 OR 4 EACH: CPT

## 2019-01-01 PROCEDURE — 80202 ASSAY OF VANCOMYCIN: CPT

## 2019-01-01 PROCEDURE — 89220 SPUTUM SPECIMEN COLLECTION: CPT

## 2019-01-01 PROCEDURE — 87186 SC STD MICRODIL/AGAR DIL: CPT

## 2019-01-01 PROCEDURE — 82104 ALPHA-1-ANTITRYPSIN PHENO: CPT

## 2019-01-01 PROCEDURE — 87015 SPECIMEN INFECT AGNT CONCNTJ: CPT

## 2019-01-01 PROCEDURE — 86255 FLUORESCENT ANTIBODY SCREEN: CPT

## 2019-01-01 PROCEDURE — 87077 CULTURE AEROBIC IDENTIFY: CPT

## 2019-01-01 PROCEDURE — 5A1955Z RESPIRATORY VENTILATION, GREATER THAN 96 CONSECUTIVE HOURS: ICD-10-PCS | Performed by: FAMILY MEDICINE

## 2019-01-01 PROCEDURE — 99233 SBSQ HOSP IP/OBS HIGH 50: CPT | Performed by: FAMILY MEDICINE

## 2019-01-01 RX ORDER — DICYCLOMINE HYDROCHLORIDE 10 MG/1
10 CAPSULE ORAL
Status: CANCELLED | OUTPATIENT
Start: 2019-01-01

## 2019-01-01 RX ORDER — FLUOXETINE HYDROCHLORIDE 20 MG/1
20 CAPSULE ORAL DAILY
Status: CANCELLED | OUTPATIENT
Start: 2019-01-01

## 2019-01-01 RX ORDER — METHYLPREDNISOLONE SODIUM SUCCINATE 40 MG/ML
40 INJECTION, POWDER, LYOPHILIZED, FOR SOLUTION INTRAMUSCULAR; INTRAVENOUS EVERY 12 HOURS
Status: DISCONTINUED | OUTPATIENT
Start: 2019-01-01 | End: 2019-01-01

## 2019-01-01 RX ORDER — MIDAZOLAM HYDROCHLORIDE 1 MG/ML
1 INJECTION INTRAMUSCULAR; INTRAVENOUS
Status: CANCELLED | OUTPATIENT
Start: 2019-01-01

## 2019-01-01 RX ORDER — POTASSIUM CHLORIDE 7.45 MG/ML
10 INJECTION INTRAVENOUS ONCE
Status: COMPLETED | OUTPATIENT
Start: 2019-01-01 | End: 2019-01-01

## 2019-01-01 RX ORDER — ATORVASTATIN CALCIUM 20 MG/1
20 TABLET, FILM COATED ORAL DAILY
Status: CANCELLED | OUTPATIENT
Start: 2019-01-01

## 2019-01-01 RX ORDER — FLUOXETINE HYDROCHLORIDE 20 MG/5ML
20 LIQUID ORAL NIGHTLY
Status: DISCONTINUED | OUTPATIENT
Start: 2019-01-01 | End: 2019-01-01

## 2019-01-01 RX ORDER — FOLIC ACID 1 MG/1
1 TABLET ORAL DAILY
Status: CANCELLED | OUTPATIENT
Start: 2019-01-01

## 2019-01-01 RX ORDER — IPRATROPIUM BROMIDE AND ALBUTEROL SULFATE 2.5; .5 MG/3ML; MG/3ML
1 SOLUTION RESPIRATORY (INHALATION) EVERY 4 HOURS
Status: CANCELLED | OUTPATIENT
Start: 2019-01-01

## 2019-01-01 RX ORDER — BISACODYL 10 MG
10 SUPPOSITORY, RECTAL RECTAL DAILY PRN
Status: CANCELLED | OUTPATIENT
Start: 2019-01-01

## 2019-01-01 RX ORDER — ROPINIROLE 1 MG/1
1 TABLET, FILM COATED ORAL 3 TIMES DAILY
Status: DISCONTINUED | OUTPATIENT
Start: 2019-01-01 | End: 2019-01-01 | Stop reason: HOSPADM

## 2019-01-01 RX ORDER — PROMETHAZINE HYDROCHLORIDE 25 MG/ML
6.25 INJECTION, SOLUTION INTRAMUSCULAR; INTRAVENOUS ONCE
Status: COMPLETED | OUTPATIENT
Start: 2019-01-01 | End: 2019-01-01

## 2019-01-01 RX ORDER — FLUOXETINE HYDROCHLORIDE 20 MG/5ML
LIQUID ORAL DAILY
COMMUNITY

## 2019-01-01 RX ORDER — METHYLPREDNISOLONE SODIUM SUCCINATE 40 MG/ML
10 INJECTION, POWDER, LYOPHILIZED, FOR SOLUTION INTRAMUSCULAR; INTRAVENOUS EVERY 12 HOURS
Status: DISCONTINUED | OUTPATIENT
Start: 2019-01-01 | End: 2019-01-01

## 2019-01-01 RX ORDER — SODIUM CHLORIDE 0.9 % (FLUSH) 0.9 %
10 SYRINGE (ML) INJECTION PRN
Status: DISCONTINUED | OUTPATIENT
Start: 2019-01-01 | End: 2019-01-01 | Stop reason: HOSPADM

## 2019-01-01 RX ORDER — HYDROCODONE BITARTRATE AND ACETAMINOPHEN 10; 325 MG/1; MG/1
1 TABLET ORAL EVERY 6 HOURS PRN
COMMUNITY

## 2019-01-01 RX ORDER — ATORVASTATIN CALCIUM 20 MG/1
20 TABLET, FILM COATED ORAL DAILY
Status: DISCONTINUED | OUTPATIENT
Start: 2019-01-01 | End: 2019-01-01 | Stop reason: HOSPADM

## 2019-01-01 RX ORDER — CHLORHEXIDINE GLUCONATE 0.12 MG/ML
15 RINSE ORAL 2 TIMES DAILY
Qty: 420 ML | Refills: 0 | Status: SHIPPED | OUTPATIENT
Start: 2019-01-01 | End: 2019-06-06

## 2019-01-01 RX ORDER — MORPHINE SULFATE 1 MG/ML
1 INJECTION, SOLUTION EPIDURAL; INTRATHECAL; INTRAVENOUS
Status: CANCELLED | OUTPATIENT
Start: 2019-01-01

## 2019-01-01 RX ORDER — VANCOMYCIN HYDROCHLORIDE 1 G/200ML
1000 INJECTION, SOLUTION INTRAVENOUS
Status: DISCONTINUED | OUTPATIENT
Start: 2019-01-01 | End: 2019-01-01

## 2019-01-01 RX ORDER — PROPOFOL 10 MG/ML
10 INJECTION, EMULSION INTRAVENOUS
Status: DISCONTINUED | OUTPATIENT
Start: 2019-01-01 | End: 2019-01-01

## 2019-01-01 RX ORDER — MORPHINE SULFATE 2 MG/ML
2 INJECTION, SOLUTION INTRAMUSCULAR; INTRAVENOUS
Status: DISCONTINUED | OUTPATIENT
Start: 2019-01-01 | End: 2019-01-01

## 2019-01-01 RX ORDER — METHYLPREDNISOLONE SODIUM SUCCINATE 40 MG/ML
40 INJECTION, POWDER, LYOPHILIZED, FOR SOLUTION INTRAMUSCULAR; INTRAVENOUS EVERY 6 HOURS
Status: CANCELLED | OUTPATIENT
Start: 2019-01-01

## 2019-01-01 RX ORDER — DOCUSATE SODIUM 100 MG/1
100 CAPSULE, LIQUID FILLED ORAL 2 TIMES DAILY
Status: DISCONTINUED | OUTPATIENT
Start: 2019-01-01 | End: 2019-01-01 | Stop reason: HOSPADM

## 2019-01-01 RX ORDER — DOCUSATE SODIUM 100 MG/1
100 CAPSULE, LIQUID FILLED ORAL DAILY
Status: DISCONTINUED | OUTPATIENT
Start: 2019-01-01 | End: 2019-01-01

## 2019-01-01 RX ORDER — SODIUM CHLORIDE 9 MG/ML
INJECTION, SOLUTION INTRAVENOUS CONTINUOUS
Status: DISCONTINUED | OUTPATIENT
Start: 2019-01-01 | End: 2019-01-01

## 2019-01-01 RX ORDER — FOLIC ACID 1 MG/1
1 TABLET ORAL DAILY
Status: DISCONTINUED | OUTPATIENT
Start: 2019-01-01 | End: 2019-01-01 | Stop reason: HOSPADM

## 2019-01-01 RX ORDER — M-VIT,TX,IRON,MINS/CALC/FOLIC 27MG-0.4MG
1 TABLET ORAL DAILY
Status: ON HOLD | COMMUNITY
End: 2019-01-01 | Stop reason: HOSPADM

## 2019-01-01 RX ORDER — MORPHINE SULFATE 2 MG/ML
1 INJECTION, SOLUTION INTRAMUSCULAR; INTRAVENOUS
Status: DISCONTINUED | OUTPATIENT
Start: 2019-01-01 | End: 2019-01-01

## 2019-01-01 RX ORDER — FUROSEMIDE 10 MG/ML
20 INJECTION INTRAMUSCULAR; INTRAVENOUS DAILY
Qty: 30 SYRINGE | Refills: 0 | Status: SHIPPED | OUTPATIENT
Start: 2019-01-01

## 2019-01-01 RX ORDER — ALPRAZOLAM 0.25 MG/1
0.25 TABLET ORAL 3 TIMES DAILY PRN
Status: CANCELLED | OUTPATIENT
Start: 2019-01-01

## 2019-01-01 RX ORDER — MAGNESIUM SULFATE IN WATER 40 MG/ML
2 INJECTION, SOLUTION INTRAVENOUS ONCE
Status: COMPLETED | OUTPATIENT
Start: 2019-01-01 | End: 2019-01-01

## 2019-01-01 RX ORDER — CLONAZEPAM 0.5 MG/1
0.5 TABLET ORAL NIGHTLY
Qty: 3 TABLET | Refills: 0 | Status: SHIPPED | OUTPATIENT
Start: 2019-01-01 | End: 2019-01-01

## 2019-01-01 RX ORDER — PSEUDOEPHEDRINE HCL 30 MG
100 TABLET ORAL DAILY
Qty: 30 CAPSULE | Refills: 0 | Status: SHIPPED | OUTPATIENT
Start: 2019-01-01

## 2019-01-01 RX ORDER — GABAPENTIN 400 MG/1
400 CAPSULE ORAL 3 TIMES DAILY
Status: ON HOLD | COMMUNITY
End: 2019-01-01 | Stop reason: HOSPADM

## 2019-01-01 RX ORDER — ALBUTEROL SULFATE 2.5 MG/3ML
2.5 SOLUTION RESPIRATORY (INHALATION) EVERY 4 HOURS PRN
Status: DISCONTINUED | OUTPATIENT
Start: 2019-01-01 | End: 2019-01-01 | Stop reason: HOSPADM

## 2019-01-01 RX ORDER — POLYVINYL ALCOHOL 14 MG/ML
1 SOLUTION/ DROPS OPHTHALMIC EVERY 4 HOURS
Qty: 1 BOTTLE | Refills: 4 | Status: SHIPPED | OUTPATIENT
Start: 2019-01-01 | End: 2019-06-22

## 2019-01-01 RX ORDER — VANCOMYCIN HYDROCHLORIDE 1 G/200ML
1000 INJECTION, SOLUTION INTRAVENOUS
Qty: 4000 ML | Refills: 0 | Status: SHIPPED | OUTPATIENT
Start: 2019-01-01

## 2019-01-01 RX ORDER — ALBUTEROL SULFATE 2.5 MG/3ML
2.5 SOLUTION RESPIRATORY (INHALATION) EVERY 4 HOURS PRN
Qty: 120 EACH | Refills: 3 | Status: SHIPPED | OUTPATIENT
Start: 2019-01-01

## 2019-01-01 RX ORDER — POTASSIUM CHLORIDE 20 MEQ/1
20 TABLET, EXTENDED RELEASE ORAL 2 TIMES DAILY
Status: ON HOLD | COMMUNITY
End: 2019-01-01 | Stop reason: HOSPADM

## 2019-01-01 RX ORDER — CLONAZEPAM 0.5 MG/1
0.5 TABLET ORAL NIGHTLY
Status: DISCONTINUED | OUTPATIENT
Start: 2019-01-01 | End: 2019-01-01 | Stop reason: HOSPADM

## 2019-01-01 RX ORDER — MIDAZOLAM HYDROCHLORIDE 1 MG/ML
1 INJECTION INTRAMUSCULAR; INTRAVENOUS
Status: DISCONTINUED | OUTPATIENT
Start: 2019-01-01 | End: 2019-01-01 | Stop reason: HOSPADM

## 2019-01-01 RX ORDER — GABAPENTIN 300 MG/1
300 CAPSULE ORAL 3 TIMES DAILY
Status: DISCONTINUED | OUTPATIENT
Start: 2019-01-01 | End: 2019-01-01

## 2019-01-01 RX ORDER — BISACODYL 10 MG
10 SUPPOSITORY, RECTAL RECTAL DAILY PRN
Status: DISCONTINUED | OUTPATIENT
Start: 2019-01-01 | End: 2019-01-01 | Stop reason: HOSPADM

## 2019-01-01 RX ORDER — FLUOXETINE HYDROCHLORIDE 20 MG/5ML
20 LIQUID ORAL DAILY
Status: DISCONTINUED | OUTPATIENT
Start: 2019-01-01 | End: 2019-01-01

## 2019-01-01 RX ORDER — METHYLPREDNISOLONE SODIUM SUCCINATE 40 MG/ML
20 INJECTION, POWDER, LYOPHILIZED, FOR SOLUTION INTRAMUSCULAR; INTRAVENOUS EVERY 12 HOURS
Qty: 30 EACH | Refills: 0 | Status: SHIPPED | OUTPATIENT
Start: 2019-01-01

## 2019-01-01 RX ORDER — ALPRAZOLAM 0.25 MG/1
0.25 TABLET ORAL 3 TIMES DAILY PRN
Status: DISCONTINUED | OUTPATIENT
Start: 2019-01-01 | End: 2019-01-01 | Stop reason: HOSPADM

## 2019-01-01 RX ORDER — DEXTROSE, SODIUM CHLORIDE, AND POTASSIUM CHLORIDE 5; .45; .15 G/100ML; G/100ML; G/100ML
INJECTION INTRAVENOUS CONTINUOUS
Status: DISCONTINUED | OUTPATIENT
Start: 2019-01-01 | End: 2019-01-01

## 2019-01-01 RX ORDER — ACETAMINOPHEN 650 MG/1
650 SUPPOSITORY RECTAL EVERY 4 HOURS PRN
Status: DISCONTINUED | OUTPATIENT
Start: 2019-01-01 | End: 2019-01-01 | Stop reason: HOSPADM

## 2019-01-01 RX ORDER — ZOLPIDEM TARTRATE 10 MG/1
TABLET ORAL NIGHTLY PRN
Status: ON HOLD | COMMUNITY
End: 2019-01-01 | Stop reason: HOSPADM

## 2019-01-01 RX ORDER — SODIUM CHLORIDE 0.9 % (FLUSH) 0.9 %
10 SYRINGE (ML) INJECTION EVERY 12 HOURS SCHEDULED
Status: CANCELLED | OUTPATIENT
Start: 2019-01-01

## 2019-01-01 RX ORDER — IPRATROPIUM BROMIDE AND ALBUTEROL SULFATE 2.5; .5 MG/3ML; MG/3ML
3 SOLUTION RESPIRATORY (INHALATION) EVERY 4 HOURS
Qty: 360 ML | Refills: 0 | Status: SHIPPED | OUTPATIENT
Start: 2019-01-01

## 2019-01-01 RX ORDER — ROPINIROLE 1 MG/1
1 TABLET, FILM COATED ORAL 2 TIMES DAILY
COMMUNITY

## 2019-01-01 RX ORDER — POLYETHYLENE GLYCOL 3350 17 G/17G
17 POWDER, FOR SOLUTION ORAL DAILY PRN
Status: CANCELLED | OUTPATIENT
Start: 2019-01-01

## 2019-01-01 RX ORDER — ALPRAZOLAM 0.25 MG/1
0.25 TABLET ORAL 3 TIMES DAILY PRN
COMMUNITY

## 2019-01-01 RX ORDER — FUROSEMIDE 40 MG/1
40 TABLET ORAL 2 TIMES DAILY
Status: ON HOLD | COMMUNITY
End: 2019-01-01 | Stop reason: HOSPADM

## 2019-01-01 RX ORDER — MIDAZOLAM HYDROCHLORIDE 1 MG/ML
2 INJECTION INTRAMUSCULAR; INTRAVENOUS EVERY 10 MIN PRN
Status: ACTIVE | OUTPATIENT
Start: 2019-01-01 | End: 2019-01-01

## 2019-01-01 RX ORDER — DOCUSATE SODIUM 100 MG/1
100 CAPSULE, LIQUID FILLED ORAL 2 TIMES DAILY
Status: CANCELLED | OUTPATIENT
Start: 2019-01-01

## 2019-01-01 RX ORDER — ACETAMINOPHEN 650 MG/1
650 SUPPOSITORY RECTAL EVERY 4 HOURS PRN
Status: CANCELLED | OUTPATIENT
Start: 2019-01-01

## 2019-01-01 RX ORDER — METHYLPREDNISOLONE SODIUM SUCCINATE 40 MG/ML
40 INJECTION, POWDER, LYOPHILIZED, FOR SOLUTION INTRAMUSCULAR; INTRAVENOUS EVERY 6 HOURS
Status: DISCONTINUED | OUTPATIENT
Start: 2019-01-01 | End: 2019-01-01 | Stop reason: HOSPADM

## 2019-01-01 RX ORDER — GABAPENTIN 300 MG/1
300 CAPSULE ORAL 3 TIMES DAILY
Qty: 90 CAPSULE | Refills: 3 | Status: SHIPPED | OUTPATIENT
Start: 2019-01-01 | End: 2019-06-22

## 2019-01-01 RX ORDER — ROPINIROLE 1 MG/1
1 TABLET, FILM COATED ORAL 3 TIMES DAILY
Status: CANCELLED | OUTPATIENT
Start: 2019-01-01

## 2019-01-01 RX ORDER — PANTOPRAZOLE SODIUM 40 MG/1
40 TABLET, DELAYED RELEASE ORAL DAILY
Status: ON HOLD | COMMUNITY
End: 2019-01-01 | Stop reason: HOSPADM

## 2019-01-01 RX ORDER — GABAPENTIN 400 MG/1
400 CAPSULE ORAL 3 TIMES DAILY
Status: DISCONTINUED | OUTPATIENT
Start: 2019-01-01 | End: 2019-01-01

## 2019-01-01 RX ORDER — BACLOFEN 20 MG/1
20 TABLET ORAL 3 TIMES DAILY
Status: ON HOLD | COMMUNITY
End: 2019-01-01 | Stop reason: HOSPADM

## 2019-01-01 RX ORDER — BISACODYL 10 MG
10 SUPPOSITORY, RECTAL RECTAL DAILY PRN
Qty: 10 SUPPOSITORY | Refills: 0 | Status: SHIPPED | OUTPATIENT
Start: 2019-01-01 | End: 2019-06-22

## 2019-01-01 RX ORDER — FUROSEMIDE 10 MG/ML
20 INJECTION INTRAMUSCULAR; INTRAVENOUS 2 TIMES DAILY
Status: DISCONTINUED | OUTPATIENT
Start: 2019-01-01 | End: 2019-01-01

## 2019-01-01 RX ORDER — CHOLECALCIFEROL (VITAMIN D3) 1250 MCG
50000 CAPSULE ORAL WEEKLY
Status: ON HOLD | COMMUNITY
End: 2019-01-01 | Stop reason: HOSPADM

## 2019-01-01 RX ORDER — SODIUM CHLORIDE 0.9 % (FLUSH) 0.9 %
10 SYRINGE (ML) INJECTION EVERY 12 HOURS SCHEDULED
Status: DISCONTINUED | OUTPATIENT
Start: 2019-01-01 | End: 2019-01-01 | Stop reason: HOSPADM

## 2019-01-01 RX ORDER — MIDAZOLAM HYDROCHLORIDE 1 MG/ML
1 INJECTION INTRAMUSCULAR; INTRAVENOUS
Qty: 2.5 ML | Refills: 0 | Status: SHIPPED | OUTPATIENT
Start: 2019-01-01 | End: 2019-06-22

## 2019-01-01 RX ORDER — DICYCLOMINE HYDROCHLORIDE 10 MG/1
10 CAPSULE ORAL
Status: DISCONTINUED | OUTPATIENT
Start: 2019-01-01 | End: 2019-01-01 | Stop reason: HOSPADM

## 2019-01-01 RX ORDER — BISACODYL 10 MG
10 SUPPOSITORY, RECTAL RECTAL DAILY PRN
Status: DISCONTINUED | OUTPATIENT
Start: 2019-01-01 | End: 2019-01-01 | Stop reason: SDUPTHER

## 2019-01-01 RX ORDER — HYDROCODONE BITARTRATE AND ACETAMINOPHEN 10; 325 MG/1; MG/1
1 TABLET ORAL EVERY 6 HOURS PRN
Status: CANCELLED | OUTPATIENT
Start: 2019-01-01

## 2019-01-01 RX ORDER — SODIUM CHLORIDE 0.9 % (FLUSH) 0.9 %
10 SYRINGE (ML) INJECTION PRN
Status: CANCELLED | OUTPATIENT
Start: 2019-01-01

## 2019-01-01 RX ORDER — MORPHINE SULFATE 2 MG/ML
1 INJECTION, SOLUTION INTRAMUSCULAR; INTRAVENOUS
Status: DISCONTINUED | OUTPATIENT
Start: 2019-01-01 | End: 2019-01-01 | Stop reason: HOSPADM

## 2019-01-01 RX ORDER — CHLORHEXIDINE GLUCONATE 0.12 MG/ML
15 RINSE ORAL 2 TIMES DAILY
Status: DISCONTINUED | OUTPATIENT
Start: 2019-01-01 | End: 2019-01-01

## 2019-01-01 RX ORDER — POLYVINYL ALCOHOL 14 MG/ML
1 SOLUTION/ DROPS OPHTHALMIC EVERY 4 HOURS
Status: DISCONTINUED | OUTPATIENT
Start: 2019-01-01 | End: 2019-01-01

## 2019-01-01 RX ORDER — IPRATROPIUM BROMIDE AND ALBUTEROL SULFATE 2.5; .5 MG/3ML; MG/3ML
1 SOLUTION RESPIRATORY (INHALATION) EVERY 4 HOURS
Status: DISCONTINUED | OUTPATIENT
Start: 2019-01-01 | End: 2019-01-01 | Stop reason: HOSPADM

## 2019-01-01 RX ORDER — ALBUTEROL SULFATE 2.5 MG/3ML
2.5 SOLUTION RESPIRATORY (INHALATION) EVERY 4 HOURS PRN
Status: CANCELLED | OUTPATIENT
Start: 2019-01-01

## 2019-01-01 RX ORDER — POLYETHYLENE GLYCOL 3350 17 G/17G
17 POWDER, FOR SOLUTION ORAL DAILY PRN
Status: DISCONTINUED | OUTPATIENT
Start: 2019-01-01 | End: 2019-01-01 | Stop reason: HOSPADM

## 2019-01-01 RX ORDER — CLONAZEPAM 0.5 MG/1
0.5 TABLET ORAL NIGHTLY
Status: CANCELLED | OUTPATIENT
Start: 2019-01-01

## 2019-01-01 RX ORDER — LOSARTAN POTASSIUM 100 MG/1
100 TABLET ORAL DAILY
Status: ON HOLD | COMMUNITY
End: 2019-01-01 | Stop reason: HOSPADM

## 2019-01-01 RX ORDER — FOLIC ACID 1 MG/1
1 TABLET ORAL DAILY
COMMUNITY

## 2019-01-01 RX ORDER — METHYLPREDNISOLONE SODIUM SUCCINATE 40 MG/ML
40 INJECTION, POWDER, LYOPHILIZED, FOR SOLUTION INTRAMUSCULAR; INTRAVENOUS EVERY 8 HOURS
Status: DISCONTINUED | OUTPATIENT
Start: 2019-01-01 | End: 2019-01-01

## 2019-01-01 RX ORDER — ATORVASTATIN CALCIUM 20 MG/1
20 TABLET, FILM COATED ORAL DAILY
COMMUNITY

## 2019-01-01 RX ORDER — FLUOXETINE HYDROCHLORIDE 20 MG/1
20 CAPSULE ORAL DAILY
Status: DISCONTINUED | OUTPATIENT
Start: 2019-01-01 | End: 2019-01-01 | Stop reason: HOSPADM

## 2019-01-01 RX ORDER — FUROSEMIDE 10 MG/ML
20 INJECTION INTRAMUSCULAR; INTRAVENOUS DAILY
Status: COMPLETED | OUTPATIENT
Start: 2019-01-01 | End: 2019-01-01

## 2019-01-01 RX ORDER — BISACODYL 10 MG
10 SUPPOSITORY, RECTAL RECTAL DAILY PRN
Qty: 30 SUPPOSITORY | Refills: 0 | Status: SHIPPED | OUTPATIENT
Start: 2019-01-01 | End: 2019-06-27

## 2019-01-01 RX ORDER — PROPOFOL 10 MG/ML
10 INJECTION, EMULSION INTRAVENOUS CONTINUOUS
Qty: 1 VIAL | Refills: 0 | Status: SHIPPED | OUTPATIENT
Start: 2019-01-01

## 2019-01-01 RX ORDER — LEVOFLOXACIN 5 MG/ML
750 INJECTION, SOLUTION INTRAVENOUS EVERY 24 HOURS
Status: DISCONTINUED | OUTPATIENT
Start: 2019-01-01 | End: 2019-01-01

## 2019-01-01 RX ORDER — LEVOFLOXACIN 5 MG/ML
750 INJECTION, SOLUTION INTRAVENOUS
Qty: 1000 ML | Refills: 0 | Status: SHIPPED | OUTPATIENT
Start: 2019-01-01

## 2019-01-01 RX ORDER — HYDROCODONE BITARTRATE AND ACETAMINOPHEN 10; 325 MG/1; MG/1
1 TABLET ORAL EVERY 6 HOURS PRN
Status: DISCONTINUED | OUTPATIENT
Start: 2019-01-01 | End: 2019-01-01 | Stop reason: HOSPADM

## 2019-01-01 RX ORDER — METHYLPREDNISOLONE SODIUM SUCCINATE 40 MG/ML
20 INJECTION, POWDER, LYOPHILIZED, FOR SOLUTION INTRAMUSCULAR; INTRAVENOUS EVERY 12 HOURS
Status: DISCONTINUED | OUTPATIENT
Start: 2019-01-01 | End: 2019-01-01

## 2019-01-01 RX ORDER — MAGNESIUM OXIDE 400 MG/1
400 TABLET ORAL DAILY
Status: ON HOLD | COMMUNITY
End: 2019-01-01 | Stop reason: HOSPADM

## 2019-01-01 RX ORDER — VANCOMYCIN HYDROCHLORIDE 1 G/200ML
1000 INJECTION, SOLUTION INTRAVENOUS EVERY 12 HOURS
Status: DISCONTINUED | OUTPATIENT
Start: 2019-01-01 | End: 2019-01-01

## 2019-01-01 RX ADMIN — IPRATROPIUM BROMIDE AND ALBUTEROL SULFATE 1 AMPULE: 2.5; .5 SOLUTION RESPIRATORY (INHALATION) at 15:06

## 2019-01-01 RX ADMIN — HYDROCODONE BITARTRATE AND ACETAMINOPHEN 1 TABLET: 10; 325 TABLET ORAL at 02:15

## 2019-01-01 RX ADMIN — GABAPENTIN 300 MG: 300 CAPSULE ORAL at 22:00

## 2019-01-01 RX ADMIN — IPRATROPIUM BROMIDE AND ALBUTEROL SULFATE 1 AMPULE: 2.5; .5 SOLUTION RESPIRATORY (INHALATION) at 14:12

## 2019-01-01 RX ADMIN — VANCOMYCIN HYDROCHLORIDE 1000 MG: 10 INJECTION, POWDER, LYOPHILIZED, FOR SOLUTION INTRAVENOUS at 19:00

## 2019-01-01 RX ADMIN — LEVOFLOXACIN 750 MG: 5 INJECTION, SOLUTION INTRAVENOUS at 17:01

## 2019-01-01 RX ADMIN — METHYLPREDNISOLONE SODIUM SUCCINATE 40 MG: 40 INJECTION, POWDER, FOR SOLUTION INTRAMUSCULAR; INTRAVENOUS at 23:20

## 2019-01-01 RX ADMIN — FUROSEMIDE 20 MG: 10 INJECTION, SOLUTION INTRAMUSCULAR; INTRAVENOUS at 08:28

## 2019-01-01 RX ADMIN — IPRATROPIUM BROMIDE AND ALBUTEROL SULFATE 1 AMPULE: 2.5; .5 SOLUTION RESPIRATORY (INHALATION) at 18:51

## 2019-01-01 RX ADMIN — IPRATROPIUM BROMIDE AND ALBUTEROL SULFATE 1 AMPULE: 2.5; .5 SOLUTION RESPIRATORY (INHALATION) at 02:41

## 2019-01-01 RX ADMIN — ROPINIROLE HYDROCHLORIDE 1 MG: 1 TABLET, FILM COATED ORAL at 08:37

## 2019-01-01 RX ADMIN — ROPINIROLE HYDROCHLORIDE 1 MG: 1 TABLET, FILM COATED ORAL at 20:29

## 2019-01-01 RX ADMIN — CEFEPIME 2 G: 2 INJECTION, POWDER, FOR SOLUTION INTRAVENOUS at 17:18

## 2019-01-01 RX ADMIN — Medication 500000 UNITS: at 06:23

## 2019-01-01 RX ADMIN — GABAPENTIN 300 MG: 300 CAPSULE ORAL at 14:02

## 2019-01-01 RX ADMIN — MINERAL OIL AND PETROLATUM: 150; 830 OINTMENT OPHTHALMIC at 21:27

## 2019-01-01 RX ADMIN — METHYLPREDNISOLONE SODIUM SUCCINATE 40 MG: 40 INJECTION, POWDER, FOR SOLUTION INTRAMUSCULAR; INTRAVENOUS at 10:58

## 2019-01-01 RX ADMIN — METHYLPREDNISOLONE SODIUM SUCCINATE 40 MG: 40 INJECTION, POWDER, FOR SOLUTION INTRAMUSCULAR; INTRAVENOUS at 16:11

## 2019-01-01 RX ADMIN — MINERAL OIL AND PETROLATUM: 150; 830 OINTMENT OPHTHALMIC at 05:07

## 2019-01-01 RX ADMIN — CLONAZEPAM 0.5 MG: 0.5 TABLET ORAL at 21:38

## 2019-01-01 RX ADMIN — IPRATROPIUM BROMIDE AND ALBUTEROL SULFATE 1 AMPULE: 2.5; .5 SOLUTION RESPIRATORY (INHALATION) at 14:30

## 2019-01-01 RX ADMIN — POLYVINYL ALCOHOL 1 DROP: 14 SOLUTION/ DROPS OPHTHALMIC at 22:11

## 2019-01-01 RX ADMIN — CEFEPIME 2 G: 2 INJECTION, POWDER, FOR SOLUTION INTRAVENOUS at 08:28

## 2019-01-01 RX ADMIN — IPRATROPIUM BROMIDE AND ALBUTEROL SULFATE 1 AMPULE: 2.5; .5 SOLUTION RESPIRATORY (INHALATION) at 06:30

## 2019-01-01 RX ADMIN — METOPROLOL TARTRATE 25 MG: 25 TABLET ORAL at 19:52

## 2019-01-01 RX ADMIN — Medication 500000 UNITS: at 00:15

## 2019-01-01 RX ADMIN — MINERAL OIL AND PETROLATUM: 150; 830 OINTMENT OPHTHALMIC at 04:06

## 2019-01-01 RX ADMIN — FOLIC ACID 1 MG: 1 TABLET ORAL at 08:19

## 2019-01-01 RX ADMIN — ENOXAPARIN SODIUM 80 MG: 80 INJECTION SUBCUTANEOUS at 20:29

## 2019-01-01 RX ADMIN — METHYLPREDNISOLONE SODIUM SUCCINATE 10 MG: 40 INJECTION, POWDER, FOR SOLUTION INTRAMUSCULAR; INTRAVENOUS at 08:30

## 2019-01-01 RX ADMIN — ENOXAPARIN SODIUM 80 MG: 80 INJECTION SUBCUTANEOUS at 08:44

## 2019-01-01 RX ADMIN — FOLIC ACID 1 MG: 1 TABLET ORAL at 08:50

## 2019-01-01 RX ADMIN — ROPINIROLE HYDROCHLORIDE 1 MG: 1 TABLET, FILM COATED ORAL at 20:12

## 2019-01-01 RX ADMIN — Medication 10 ML: at 09:44

## 2019-01-01 RX ADMIN — Medication 500000 UNITS: at 19:39

## 2019-01-01 RX ADMIN — METHYLPREDNISOLONE SODIUM SUCCINATE 40 MG: 40 INJECTION, POWDER, FOR SOLUTION INTRAMUSCULAR; INTRAVENOUS at 05:36

## 2019-01-01 RX ADMIN — CHLORHEXIDINE GLUCONATE 15 ML: 1.2 RINSE ORAL at 22:35

## 2019-01-01 RX ADMIN — MINERAL OIL AND PETROLATUM: 150; 830 OINTMENT OPHTHALMIC at 17:01

## 2019-01-01 RX ADMIN — CEFEPIME 2 G: 2 INJECTION, POWDER, FOR SOLUTION INTRAVENOUS at 17:11

## 2019-01-01 RX ADMIN — PROPOFOL 50 MCG/KG/MIN: 10 INJECTION, EMULSION INTRAVENOUS at 13:49

## 2019-01-01 RX ADMIN — IPRATROPIUM BROMIDE AND ALBUTEROL SULFATE 1 AMPULE: 2.5; .5 SOLUTION RESPIRATORY (INHALATION) at 18:59

## 2019-01-01 RX ADMIN — FOLIC ACID 1 MG: 1 TABLET ORAL at 11:02

## 2019-01-01 RX ADMIN — CLONAZEPAM 0.5 MG: 0.5 TABLET ORAL at 22:16

## 2019-01-01 RX ADMIN — ROPINIROLE HYDROCHLORIDE 1 MG: 1 TABLET, FILM COATED ORAL at 08:10

## 2019-01-01 RX ADMIN — Medication 500000 UNITS: at 23:22

## 2019-01-01 RX ADMIN — FAMOTIDINE 20 MG: 10 INJECTION INTRAVENOUS at 22:37

## 2019-01-01 RX ADMIN — FAMOTIDINE 20 MG: 10 INJECTION INTRAVENOUS at 20:29

## 2019-01-01 RX ADMIN — Medication 10 ML: at 20:30

## 2019-01-01 RX ADMIN — IPRATROPIUM BROMIDE AND ALBUTEROL SULFATE 1 AMPULE: 2.5; .5 SOLUTION RESPIRATORY (INHALATION) at 01:31

## 2019-01-01 RX ADMIN — DEXTROSE, SODIUM CHLORIDE, AND POTASSIUM CHLORIDE: 5; .45; .15 INJECTION INTRAVENOUS at 11:06

## 2019-01-01 RX ADMIN — DOCUSATE SODIUM 100 MG: 100 CAPSULE, LIQUID FILLED ORAL at 08:19

## 2019-01-01 RX ADMIN — ATORVASTATIN CALCIUM 20 MG: 20 TABLET, FILM COATED ORAL at 20:30

## 2019-01-01 RX ADMIN — IPRATROPIUM BROMIDE AND ALBUTEROL SULFATE 1 AMPULE: 2.5; .5 SOLUTION RESPIRATORY (INHALATION) at 02:32

## 2019-01-01 RX ADMIN — ENOXAPARIN SODIUM 70 MG: 80 INJECTION SUBCUTANEOUS at 19:52

## 2019-01-01 RX ADMIN — ENOXAPARIN SODIUM 70 MG: 80 INJECTION SUBCUTANEOUS at 21:38

## 2019-01-01 RX ADMIN — ROPINIROLE HYDROCHLORIDE 1 MG: 1 TABLET, FILM COATED ORAL at 08:02

## 2019-01-01 RX ADMIN — Medication 500000 UNITS: at 23:47

## 2019-01-01 RX ADMIN — IPRATROPIUM BROMIDE AND ALBUTEROL SULFATE 1 AMPULE: 2.5; .5 SOLUTION RESPIRATORY (INHALATION) at 14:50

## 2019-01-01 RX ADMIN — CHLORHEXIDINE GLUCONATE 15 ML: 1.2 RINSE ORAL at 22:10

## 2019-01-01 RX ADMIN — ENOXAPARIN SODIUM 70 MG: 80 INJECTION SUBCUTANEOUS at 09:14

## 2019-01-01 RX ADMIN — CHLORHEXIDINE GLUCONATE 15 ML: 1.2 RINSE ORAL at 22:00

## 2019-01-01 RX ADMIN — IPRATROPIUM BROMIDE AND ALBUTEROL SULFATE 1 AMPULE: 2.5; .5 SOLUTION RESPIRATORY (INHALATION) at 18:56

## 2019-01-01 RX ADMIN — Medication 20 MG: at 08:02

## 2019-01-01 RX ADMIN — CLONAZEPAM 0.5 MG: 0.5 TABLET ORAL at 20:30

## 2019-01-01 RX ADMIN — ROPINIROLE HYDROCHLORIDE 1 MG: 1 TABLET, FILM COATED ORAL at 08:50

## 2019-01-01 RX ADMIN — MIDAZOLAM 5 MG/HR: 5 INJECTION INTRAMUSCULAR; INTRAVENOUS at 09:00

## 2019-01-01 RX ADMIN — IPRATROPIUM BROMIDE AND ALBUTEROL SULFATE 1 AMPULE: 2.5; .5 SOLUTION RESPIRATORY (INHALATION) at 22:35

## 2019-01-01 RX ADMIN — LEVOFLOXACIN 750 MG: 5 INJECTION, SOLUTION INTRAVENOUS at 16:34

## 2019-01-01 RX ADMIN — IPRATROPIUM BROMIDE AND ALBUTEROL SULFATE 1 AMPULE: 2.5; .5 SOLUTION RESPIRATORY (INHALATION) at 22:49

## 2019-01-01 RX ADMIN — ROPINIROLE HYDROCHLORIDE 1 MG: 1 TABLET, FILM COATED ORAL at 14:07

## 2019-01-01 RX ADMIN — METOPROLOL TARTRATE 25 MG: 25 TABLET ORAL at 09:15

## 2019-01-01 RX ADMIN — CHLORHEXIDINE GLUCONATE 15 ML: 1.2 RINSE ORAL at 09:44

## 2019-01-01 RX ADMIN — PROPOFOL 50 MCG/KG/MIN: 10 INJECTION, EMULSION INTRAVENOUS at 03:26

## 2019-01-01 RX ADMIN — Medication 10 ML: at 08:44

## 2019-01-01 RX ADMIN — HYDROCODONE BITARTRATE AND ACETAMINOPHEN 1 TABLET: 10; 325 TABLET ORAL at 08:30

## 2019-01-01 RX ADMIN — METHYLPREDNISOLONE SODIUM SUCCINATE 40 MG: 40 INJECTION, POWDER, FOR SOLUTION INTRAMUSCULAR; INTRAVENOUS at 21:27

## 2019-01-01 RX ADMIN — IPRATROPIUM BROMIDE AND ALBUTEROL SULFATE 1 AMPULE: 2.5; .5 SOLUTION RESPIRATORY (INHALATION) at 10:05

## 2019-01-01 RX ADMIN — MIDAZOLAM 2 MG/HR: 5 INJECTION INTRAMUSCULAR; INTRAVENOUS at 22:32

## 2019-01-01 RX ADMIN — POLYVINYL ALCOHOL 1 DROP: 14 SOLUTION/ DROPS OPHTHALMIC at 20:29

## 2019-01-01 RX ADMIN — IPRATROPIUM BROMIDE AND ALBUTEROL SULFATE 1 AMPULE: 2.5; .5 SOLUTION RESPIRATORY (INHALATION) at 10:23

## 2019-01-01 RX ADMIN — PROPOFOL 30 MCG/KG/MIN: 10 INJECTION, EMULSION INTRAVENOUS at 15:03

## 2019-01-01 RX ADMIN — IPRATROPIUM BROMIDE AND ALBUTEROL SULFATE 1 AMPULE: 2.5; .5 SOLUTION RESPIRATORY (INHALATION) at 02:07

## 2019-01-01 RX ADMIN — ROPINIROLE HYDROCHLORIDE 1 MG: 1 TABLET, FILM COATED ORAL at 08:44

## 2019-01-01 RX ADMIN — CEFEPIME 2 G: 2 INJECTION, POWDER, FOR SOLUTION INTRAVENOUS at 00:49

## 2019-01-01 RX ADMIN — MINERAL OIL AND PETROLATUM: 150; 830 OINTMENT OPHTHALMIC at 08:49

## 2019-01-01 RX ADMIN — METHYLPREDNISOLONE SODIUM SUCCINATE 40 MG: 40 INJECTION, POWDER, FOR SOLUTION INTRAMUSCULAR; INTRAVENOUS at 22:24

## 2019-01-01 RX ADMIN — Medication 500000 UNITS: at 23:29

## 2019-01-01 RX ADMIN — HYDROCODONE BITARTRATE AND ACETAMINOPHEN 1 TABLET: 10; 325 TABLET ORAL at 01:22

## 2019-01-01 RX ADMIN — Medication 500000 UNITS: at 19:41

## 2019-01-01 RX ADMIN — ATORVASTATIN CALCIUM 20 MG: 20 TABLET, FILM COATED ORAL at 21:39

## 2019-01-01 RX ADMIN — GABAPENTIN 300 MG: 300 CAPSULE ORAL at 08:49

## 2019-01-01 RX ADMIN — ROPINIROLE HYDROCHLORIDE 1 MG: 1 TABLET, FILM COATED ORAL at 08:19

## 2019-01-01 RX ADMIN — IPRATROPIUM BROMIDE AND ALBUTEROL SULFATE 1 AMPULE: 2.5; .5 SOLUTION RESPIRATORY (INHALATION) at 14:33

## 2019-01-01 RX ADMIN — MORPHINE SULFATE 2 MG: 2 INJECTION, SOLUTION INTRAMUSCULAR; INTRAVENOUS at 09:41

## 2019-01-01 RX ADMIN — IPRATROPIUM BROMIDE AND ALBUTEROL SULFATE 1 AMPULE: 2.5; .5 SOLUTION RESPIRATORY (INHALATION) at 15:45

## 2019-01-01 RX ADMIN — ENOXAPARIN SODIUM 80 MG: 80 INJECTION SUBCUTANEOUS at 08:48

## 2019-01-01 RX ADMIN — ALPRAZOLAM 0.25 MG: 0.25 TABLET ORAL at 09:18

## 2019-01-01 RX ADMIN — CEFEPIME 2 G: 2 INJECTION, POWDER, FOR SOLUTION INTRAVENOUS at 09:42

## 2019-01-01 RX ADMIN — POLYVINYL ALCOHOL 1 DROP: 14 SOLUTION/ DROPS OPHTHALMIC at 03:30

## 2019-01-01 RX ADMIN — CEFEPIME 2 G: 2 INJECTION, POWDER, FOR SOLUTION INTRAVENOUS at 01:11

## 2019-01-01 RX ADMIN — ENOXAPARIN SODIUM 80 MG: 80 INJECTION SUBCUTANEOUS at 20:03

## 2019-01-01 RX ADMIN — Medication 10 ML: at 21:28

## 2019-01-01 RX ADMIN — MINERAL OIL AND PETROLATUM: 150; 830 OINTMENT OPHTHALMIC at 08:39

## 2019-01-01 RX ADMIN — PROPOFOL 30 MCG/KG/MIN: 10 INJECTION, EMULSION INTRAVENOUS at 04:06

## 2019-01-01 RX ADMIN — MORPHINE SULFATE 2 MG: 2 INJECTION, SOLUTION INTRAMUSCULAR; INTRAVENOUS at 14:33

## 2019-01-01 RX ADMIN — CLONAZEPAM 0.5 MG: 0.5 TABLET ORAL at 22:33

## 2019-01-01 RX ADMIN — MORPHINE SULFATE 2 MG: 2 INJECTION, SOLUTION INTRAMUSCULAR; INTRAVENOUS at 18:59

## 2019-01-01 RX ADMIN — HYDROCODONE BITARTRATE AND ACETAMINOPHEN 1 TABLET: 10; 325 TABLET ORAL at 19:55

## 2019-01-01 RX ADMIN — ROPINIROLE HYDROCHLORIDE 1 MG: 1 TABLET, FILM COATED ORAL at 14:24

## 2019-01-01 RX ADMIN — Medication 500000 UNITS: at 22:22

## 2019-01-01 RX ADMIN — Medication 10 ML: at 08:11

## 2019-01-01 RX ADMIN — FAMOTIDINE 20 MG: 10 INJECTION INTRAVENOUS at 09:00

## 2019-01-01 RX ADMIN — MINERAL OIL AND PETROLATUM: 150; 830 OINTMENT OPHTHALMIC at 17:08

## 2019-01-01 RX ADMIN — MIDAZOLAM HYDROCHLORIDE 1 MG: 2 INJECTION, SOLUTION INTRAMUSCULAR; INTRAVENOUS at 20:15

## 2019-01-01 RX ADMIN — GABAPENTIN 300 MG: 300 CAPSULE ORAL at 22:36

## 2019-01-01 RX ADMIN — CHLORHEXIDINE GLUCONATE 15 ML: 1.2 RINSE ORAL at 20:30

## 2019-01-01 RX ADMIN — PROPOFOL 35 MCG/KG/MIN: 10 INJECTION, EMULSION INTRAVENOUS at 10:53

## 2019-01-01 RX ADMIN — ROPINIROLE HYDROCHLORIDE 1 MG: 1 TABLET, FILM COATED ORAL at 20:03

## 2019-01-01 RX ADMIN — IPRATROPIUM BROMIDE AND ALBUTEROL SULFATE 1 AMPULE: 2.5; .5 SOLUTION RESPIRATORY (INHALATION) at 22:16

## 2019-01-01 RX ADMIN — MAGNESIUM SULFATE HEPTAHYDRATE 2 G: 40 INJECTION, SOLUTION INTRAVENOUS at 16:06

## 2019-01-01 RX ADMIN — IPRATROPIUM BROMIDE AND ALBUTEROL SULFATE 1 AMPULE: 2.5; .5 SOLUTION RESPIRATORY (INHALATION) at 11:11

## 2019-01-01 RX ADMIN — MORPHINE SULFATE 1 MG: 2 INJECTION, SOLUTION INTRAMUSCULAR; INTRAVENOUS at 16:29

## 2019-01-01 RX ADMIN — FAMOTIDINE 20 MG: 10 INJECTION INTRAVENOUS at 08:38

## 2019-01-01 RX ADMIN — Medication 500000 UNITS: at 06:37

## 2019-01-01 RX ADMIN — METHYLPREDNISOLONE SODIUM SUCCINATE 10 MG: 40 INJECTION, POWDER, FOR SOLUTION INTRAMUSCULAR; INTRAVENOUS at 08:02

## 2019-01-01 RX ADMIN — ROPINIROLE HYDROCHLORIDE 1 MG: 1 TABLET, FILM COATED ORAL at 22:36

## 2019-01-01 RX ADMIN — ENOXAPARIN SODIUM 80 MG: 80 INJECTION SUBCUTANEOUS at 22:10

## 2019-01-01 RX ADMIN — ENOXAPARIN SODIUM 80 MG: 80 INJECTION SUBCUTANEOUS at 09:00

## 2019-01-01 RX ADMIN — DEXTROSE, SODIUM CHLORIDE, AND POTASSIUM CHLORIDE: 5; .45; .15 INJECTION INTRAVENOUS at 11:21

## 2019-01-01 RX ADMIN — PROPOFOL 30 MCG/KG/MIN: 10 INJECTION, EMULSION INTRAVENOUS at 21:30

## 2019-01-01 RX ADMIN — METOPROLOL TARTRATE 25 MG: 25 TABLET ORAL at 08:28

## 2019-01-01 RX ADMIN — Medication 500000 UNITS: at 17:07

## 2019-01-01 RX ADMIN — IPRATROPIUM BROMIDE AND ALBUTEROL SULFATE 1 AMPULE: 2.5; .5 SOLUTION RESPIRATORY (INHALATION) at 19:03

## 2019-01-01 RX ADMIN — Medication 10 ML: at 20:12

## 2019-01-01 RX ADMIN — METHYLPREDNISOLONE SODIUM SUCCINATE 40 MG: 40 INJECTION, POWDER, FOR SOLUTION INTRAMUSCULAR; INTRAVENOUS at 10:50

## 2019-01-01 RX ADMIN — MORPHINE SULFATE 2 MG: 2 INJECTION, SOLUTION INTRAMUSCULAR; INTRAVENOUS at 15:24

## 2019-01-01 RX ADMIN — Medication 10 ML: at 08:29

## 2019-01-01 RX ADMIN — METHYLPREDNISOLONE SODIUM SUCCINATE 40 MG: 40 INJECTION, POWDER, FOR SOLUTION INTRAMUSCULAR; INTRAVENOUS at 17:42

## 2019-01-01 RX ADMIN — CLONAZEPAM 0.5 MG: 0.5 TABLET ORAL at 20:03

## 2019-01-01 RX ADMIN — MORPHINE SULFATE 2 MG: 2 INJECTION, SOLUTION INTRAMUSCULAR; INTRAVENOUS at 14:48

## 2019-01-01 RX ADMIN — ROPINIROLE HYDROCHLORIDE 1 MG: 1 TABLET, FILM COATED ORAL at 13:36

## 2019-01-01 RX ADMIN — VANCOMYCIN HYDROCHLORIDE 1000 MG: 1 INJECTION, SOLUTION INTRAVENOUS at 04:50

## 2019-01-01 RX ADMIN — Medication 500000 UNITS: at 18:09

## 2019-01-01 RX ADMIN — MINERAL OIL AND PETROLATUM: 150; 830 OINTMENT OPHTHALMIC at 01:11

## 2019-01-01 RX ADMIN — Medication 10 ML: at 08:19

## 2019-01-01 RX ADMIN — HYDROCODONE BITARTRATE AND ACETAMINOPHEN 1 TABLET: 10; 325 TABLET ORAL at 15:39

## 2019-01-01 RX ADMIN — IPRATROPIUM BROMIDE AND ALBUTEROL SULFATE 1 AMPULE: 2.5; .5 SOLUTION RESPIRATORY (INHALATION) at 02:33

## 2019-01-01 RX ADMIN — MINERAL OIL AND PETROLATUM: 150; 830 OINTMENT OPHTHALMIC at 04:50

## 2019-01-01 RX ADMIN — ATORVASTATIN CALCIUM 20 MG: 20 TABLET, FILM COATED ORAL at 19:52

## 2019-01-01 RX ADMIN — ENOXAPARIN SODIUM 80 MG: 80 INJECTION SUBCUTANEOUS at 21:27

## 2019-01-01 RX ADMIN — MINERAL OIL AND PETROLATUM: 150; 830 OINTMENT OPHTHALMIC at 17:13

## 2019-01-01 RX ADMIN — MINERAL OIL AND PETROLATUM: 150; 830 OINTMENT OPHTHALMIC at 01:00

## 2019-01-01 RX ADMIN — ROPINIROLE HYDROCHLORIDE 1 MG: 1 TABLET, FILM COATED ORAL at 14:02

## 2019-01-01 RX ADMIN — Medication 20 MG: at 08:28

## 2019-01-01 RX ADMIN — METHYLPREDNISOLONE SODIUM SUCCINATE 40 MG: 40 INJECTION, POWDER, FOR SOLUTION INTRAMUSCULAR; INTRAVENOUS at 11:35

## 2019-01-01 RX ADMIN — GABAPENTIN 300 MG: 300 CAPSULE ORAL at 13:50

## 2019-01-01 RX ADMIN — METHYLPREDNISOLONE SODIUM SUCCINATE 40 MG: 40 INJECTION, POWDER, FOR SOLUTION INTRAMUSCULAR; INTRAVENOUS at 22:37

## 2019-01-01 RX ADMIN — FAMOTIDINE 20 MG: 10 INJECTION INTRAVENOUS at 20:03

## 2019-01-01 RX ADMIN — FLUOXETINE HYDROCHLORIDE 20 MG: 20 CAPSULE ORAL at 08:20

## 2019-01-01 RX ADMIN — HYDROCODONE BITARTRATE AND ACETAMINOPHEN 1 TABLET: 10; 325 TABLET ORAL at 22:20

## 2019-01-01 RX ADMIN — FOLIC ACID 1 MG: 1 TABLET ORAL at 08:10

## 2019-01-01 RX ADMIN — ROPINIROLE HYDROCHLORIDE 1 MG: 1 TABLET, FILM COATED ORAL at 08:27

## 2019-01-01 RX ADMIN — POLYVINYL ALCOHOL 1 DROP: 14 SOLUTION/ DROPS OPHTHALMIC at 17:08

## 2019-01-01 RX ADMIN — Medication 10 ML: at 22:22

## 2019-01-01 RX ADMIN — METHYLPREDNISOLONE SODIUM SUCCINATE 40 MG: 40 INJECTION, POWDER, FOR SOLUTION INTRAMUSCULAR; INTRAVENOUS at 04:12

## 2019-01-01 RX ADMIN — POLYVINYL ALCOHOL 1 DROP: 14 SOLUTION/ DROPS OPHTHALMIC at 07:15

## 2019-01-01 RX ADMIN — CHLORHEXIDINE GLUCONATE 15 ML: 1.2 RINSE ORAL at 08:49

## 2019-01-01 RX ADMIN — IPRATROPIUM BROMIDE AND ALBUTEROL SULFATE 1 AMPULE: 2.5; .5 SOLUTION RESPIRATORY (INHALATION) at 07:02

## 2019-01-01 RX ADMIN — CEFEPIME 2 G: 2 INJECTION, POWDER, FOR SOLUTION INTRAVENOUS at 01:27

## 2019-01-01 RX ADMIN — MIDAZOLAM 6 MG/HR: 5 INJECTION INTRAMUSCULAR; INTRAVENOUS at 16:58

## 2019-01-01 RX ADMIN — METHYLPREDNISOLONE SODIUM SUCCINATE 40 MG: 40 INJECTION, POWDER, FOR SOLUTION INTRAMUSCULAR; INTRAVENOUS at 04:51

## 2019-01-01 RX ADMIN — IPRATROPIUM BROMIDE AND ALBUTEROL SULFATE 1 AMPULE: 2.5; .5 SOLUTION RESPIRATORY (INHALATION) at 15:19

## 2019-01-01 RX ADMIN — MINERAL OIL AND PETROLATUM: 150; 830 OINTMENT OPHTHALMIC at 08:29

## 2019-01-01 RX ADMIN — VANCOMYCIN HYDROCHLORIDE 1000 MG: 1 INJECTION, SOLUTION INTRAVENOUS at 22:36

## 2019-01-01 RX ADMIN — Medication 10 ML: at 20:29

## 2019-01-01 RX ADMIN — METHYLPREDNISOLONE SODIUM SUCCINATE 10 MG: 40 INJECTION, POWDER, FOR SOLUTION INTRAMUSCULAR; INTRAVENOUS at 08:15

## 2019-01-01 RX ADMIN — MINERAL OIL AND PETROLATUM: 150; 830 OINTMENT OPHTHALMIC at 12:22

## 2019-01-01 RX ADMIN — ENOXAPARIN SODIUM 80 MG: 80 INJECTION SUBCUTANEOUS at 21:30

## 2019-01-01 RX ADMIN — ROPINIROLE HYDROCHLORIDE 1 MG: 1 TABLET, FILM COATED ORAL at 12:20

## 2019-01-01 RX ADMIN — GABAPENTIN 300 MG: 300 CAPSULE ORAL at 14:19

## 2019-01-01 RX ADMIN — MINERAL OIL AND PETROLATUM: 150; 830 OINTMENT OPHTHALMIC at 16:53

## 2019-01-01 RX ADMIN — PROMETHAZINE HYDROCHLORIDE 6.25 MG: 25 INJECTION INTRAMUSCULAR; INTRAVENOUS at 00:45

## 2019-01-01 RX ADMIN — LEVOFLOXACIN 750 MG: 5 INJECTION, SOLUTION INTRAVENOUS at 17:19

## 2019-01-01 RX ADMIN — ATORVASTATIN CALCIUM 20 MG: 20 TABLET, FILM COATED ORAL at 20:04

## 2019-01-01 RX ADMIN — IPRATROPIUM BROMIDE AND ALBUTEROL SULFATE 1 AMPULE: 2.5; .5 SOLUTION RESPIRATORY (INHALATION) at 02:27

## 2019-01-01 RX ADMIN — MORPHINE SULFATE 2 MG: 2 INJECTION, SOLUTION INTRAMUSCULAR; INTRAVENOUS at 18:46

## 2019-01-01 RX ADMIN — Medication 500000 UNITS: at 12:27

## 2019-01-01 RX ADMIN — FLUOXETINE HYDROCHLORIDE 20 MG: 20 CAPSULE ORAL at 08:44

## 2019-01-01 RX ADMIN — MINERAL OIL AND PETROLATUM: 150; 830 OINTMENT OPHTHALMIC at 00:49

## 2019-01-01 RX ADMIN — IPRATROPIUM BROMIDE AND ALBUTEROL SULFATE 1 AMPULE: 2.5; .5 SOLUTION RESPIRATORY (INHALATION) at 02:29

## 2019-01-01 RX ADMIN — ENOXAPARIN SODIUM 70 MG: 80 INJECTION SUBCUTANEOUS at 08:19

## 2019-01-01 RX ADMIN — METHYLPREDNISOLONE SODIUM SUCCINATE 20 MG: 40 INJECTION, POWDER, FOR SOLUTION INTRAMUSCULAR; INTRAVENOUS at 08:48

## 2019-01-01 RX ADMIN — ROPINIROLE HYDROCHLORIDE 1 MG: 1 TABLET, FILM COATED ORAL at 08:14

## 2019-01-01 RX ADMIN — CEFEPIME 2 G: 2 INJECTION, POWDER, FOR SOLUTION INTRAVENOUS at 09:00

## 2019-01-01 RX ADMIN — CHLORHEXIDINE GLUCONATE 15 ML: 1.2 RINSE ORAL at 09:49

## 2019-01-01 RX ADMIN — POLYVINYL ALCOHOL 1 DROP: 14 SOLUTION/ DROPS OPHTHALMIC at 11:26

## 2019-01-01 RX ADMIN — Medication 10 ML: at 04:27

## 2019-01-01 RX ADMIN — Medication 500000 UNITS: at 14:07

## 2019-01-01 RX ADMIN — MINERAL OIL AND PETROLATUM: 150; 830 OINTMENT OPHTHALMIC at 00:48

## 2019-01-01 RX ADMIN — METOPROLOL TARTRATE 25 MG: 25 TABLET ORAL at 08:11

## 2019-01-01 RX ADMIN — IPRATROPIUM BROMIDE AND ALBUTEROL SULFATE 1 AMPULE: 2.5; .5 SOLUTION RESPIRATORY (INHALATION) at 10:49

## 2019-01-01 RX ADMIN — Medication 500000 UNITS: at 00:04

## 2019-01-01 RX ADMIN — ALPRAZOLAM 0.25 MG: 0.25 TABLET ORAL at 04:44

## 2019-01-01 RX ADMIN — METHYLPREDNISOLONE SODIUM SUCCINATE 10 MG: 40 INJECTION, POWDER, FOR SOLUTION INTRAMUSCULAR; INTRAVENOUS at 21:15

## 2019-01-01 RX ADMIN — MORPHINE SULFATE 2 MG: 2 INJECTION, SOLUTION INTRAMUSCULAR; INTRAVENOUS at 04:07

## 2019-01-01 RX ADMIN — ATORVASTATIN CALCIUM 20 MG: 20 TABLET, FILM COATED ORAL at 19:56

## 2019-01-01 RX ADMIN — ENOXAPARIN SODIUM 80 MG: 80 INJECTION SUBCUTANEOUS at 22:36

## 2019-01-01 RX ADMIN — MINERAL OIL AND PETROLATUM: 150; 830 OINTMENT OPHTHALMIC at 13:00

## 2019-01-01 RX ADMIN — PROPOFOL 40 MCG/KG/MIN: 10 INJECTION, EMULSION INTRAVENOUS at 20:36

## 2019-01-01 RX ADMIN — Medication 20 MG: at 17:11

## 2019-01-01 RX ADMIN — Medication 10 ML: at 23:39

## 2019-01-01 RX ADMIN — POTASSIUM CHLORIDE 10 MEQ: 7.46 INJECTION, SOLUTION INTRAVENOUS at 11:21

## 2019-01-01 RX ADMIN — MORPHINE SULFATE 1 MG: 2 INJECTION, SOLUTION INTRAMUSCULAR; INTRAVENOUS at 11:19

## 2019-01-01 RX ADMIN — ENOXAPARIN SODIUM 70 MG: 80 INJECTION SUBCUTANEOUS at 08:20

## 2019-01-01 RX ADMIN — Medication 10 ML: at 08:28

## 2019-01-01 RX ADMIN — VANCOMYCIN HYDROCHLORIDE 1000 MG: 1 INJECTION, SOLUTION INTRAVENOUS at 11:00

## 2019-01-01 RX ADMIN — CEFEPIME 2 G: 2 INJECTION, POWDER, FOR SOLUTION INTRAVENOUS at 16:53

## 2019-01-01 RX ADMIN — FOLIC ACID 1 MG: 1 TABLET ORAL at 09:15

## 2019-01-01 RX ADMIN — METOPROLOL TARTRATE 25 MG: 25 TABLET ORAL at 20:29

## 2019-01-01 RX ADMIN — METHYLPREDNISOLONE SODIUM SUCCINATE 10 MG: 40 INJECTION, POWDER, FOR SOLUTION INTRAMUSCULAR; INTRAVENOUS at 20:12

## 2019-01-01 RX ADMIN — ALPRAZOLAM 0.25 MG: 0.25 TABLET ORAL at 22:16

## 2019-01-01 RX ADMIN — METHYLPREDNISOLONE SODIUM SUCCINATE 40 MG: 40 INJECTION, POWDER, FOR SOLUTION INTRAMUSCULAR; INTRAVENOUS at 08:50

## 2019-01-01 RX ADMIN — CEFEPIME 2 G: 2 INJECTION, POWDER, FOR SOLUTION INTRAVENOUS at 16:59

## 2019-01-01 RX ADMIN — IPRATROPIUM BROMIDE AND ALBUTEROL SULFATE 1 AMPULE: 2.5; .5 SOLUTION RESPIRATORY (INHALATION) at 22:44

## 2019-01-01 RX ADMIN — MINERAL OIL AND PETROLATUM: 150; 830 OINTMENT OPHTHALMIC at 23:30

## 2019-01-01 RX ADMIN — IPRATROPIUM BROMIDE AND ALBUTEROL SULFATE 1 AMPULE: 2.5; .5 SOLUTION RESPIRATORY (INHALATION) at 10:28

## 2019-01-01 RX ADMIN — IPRATROPIUM BROMIDE AND ALBUTEROL SULFATE 1 AMPULE: 2.5; .5 SOLUTION RESPIRATORY (INHALATION) at 18:24

## 2019-01-01 RX ADMIN — IPRATROPIUM BROMIDE AND ALBUTEROL SULFATE 1 AMPULE: 2.5; .5 SOLUTION RESPIRATORY (INHALATION) at 18:54

## 2019-01-01 RX ADMIN — Medication 500000 UNITS: at 18:17

## 2019-01-01 RX ADMIN — PROPOFOL 30 MCG/KG/MIN: 10 INJECTION, EMULSION INTRAVENOUS at 22:35

## 2019-01-01 RX ADMIN — IPRATROPIUM BROMIDE AND ALBUTEROL SULFATE 1 AMPULE: 2.5; .5 SOLUTION RESPIRATORY (INHALATION) at 22:28

## 2019-01-01 RX ADMIN — METHYLPREDNISOLONE SODIUM SUCCINATE 40 MG: 40 INJECTION, POWDER, FOR SOLUTION INTRAMUSCULAR; INTRAVENOUS at 15:20

## 2019-01-01 RX ADMIN — Medication 500000 UNITS: at 12:20

## 2019-01-01 RX ADMIN — ROPINIROLE HYDROCHLORIDE 1 MG: 1 TABLET, FILM COATED ORAL at 08:30

## 2019-01-01 RX ADMIN — CHLORHEXIDINE GLUCONATE 15 ML: 1.2 RINSE ORAL at 09:32

## 2019-01-01 RX ADMIN — DEXTROSE, SODIUM CHLORIDE, AND POTASSIUM CHLORIDE: 5; .45; .15 INJECTION INTRAVENOUS at 08:56

## 2019-01-01 RX ADMIN — IPRATROPIUM BROMIDE AND ALBUTEROL SULFATE 1 AMPULE: 2.5; .5 SOLUTION RESPIRATORY (INHALATION) at 18:35

## 2019-01-01 RX ADMIN — IPRATROPIUM BROMIDE AND ALBUTEROL SULFATE 1 AMPULE: 2.5; .5 SOLUTION RESPIRATORY (INHALATION) at 16:45

## 2019-01-01 RX ADMIN — HYDROCODONE BITARTRATE AND ACETAMINOPHEN 1 TABLET: 10; 325 TABLET ORAL at 08:13

## 2019-01-01 RX ADMIN — PROPOFOL 20 MCG/KG/MIN: 10 INJECTION, EMULSION INTRAVENOUS at 20:03

## 2019-01-01 RX ADMIN — Medication 500000 UNITS: at 22:20

## 2019-01-01 RX ADMIN — DEXTROSE, SODIUM CHLORIDE, AND POTASSIUM CHLORIDE: 5; .45; .15 INJECTION INTRAVENOUS at 09:40

## 2019-01-01 RX ADMIN — IPRATROPIUM BROMIDE AND ALBUTEROL SULFATE 1 AMPULE: 2.5; .5 SOLUTION RESPIRATORY (INHALATION) at 10:26

## 2019-01-01 RX ADMIN — MIDAZOLAM 5 MG/HR: 5 INJECTION INTRAMUSCULAR; INTRAVENOUS at 11:28

## 2019-01-01 RX ADMIN — METOPROLOL TARTRATE 25 MG: 25 TABLET ORAL at 19:36

## 2019-01-01 RX ADMIN — Medication 10 ML: at 17:11

## 2019-01-01 RX ADMIN — DEXTROSE, SODIUM CHLORIDE, AND POTASSIUM CHLORIDE: 5; .45; .15 INJECTION INTRAVENOUS at 02:30

## 2019-01-01 RX ADMIN — MINERAL OIL AND PETROLATUM: 150; 830 OINTMENT OPHTHALMIC at 12:11

## 2019-01-01 RX ADMIN — Medication 10 ML: at 08:06

## 2019-01-01 RX ADMIN — Medication 500000 UNITS: at 23:23

## 2019-01-01 RX ADMIN — DOCUSATE SODIUM 100 MG: 100 CAPSULE, LIQUID FILLED ORAL at 08:50

## 2019-01-01 RX ADMIN — IPRATROPIUM BROMIDE AND ALBUTEROL SULFATE 1 AMPULE: 2.5; .5 SOLUTION RESPIRATORY (INHALATION) at 15:02

## 2019-01-01 RX ADMIN — Medication 10 MG: at 12:11

## 2019-01-01 RX ADMIN — IPRATROPIUM BROMIDE AND ALBUTEROL SULFATE 1 AMPULE: 2.5; .5 SOLUTION RESPIRATORY (INHALATION) at 02:09

## 2019-01-01 RX ADMIN — POLYVINYL ALCOHOL 1 DROP: 14 SOLUTION/ DROPS OPHTHALMIC at 08:49

## 2019-01-01 RX ADMIN — METHYLPREDNISOLONE SODIUM SUCCINATE 40 MG: 40 INJECTION, POWDER, FOR SOLUTION INTRAMUSCULAR; INTRAVENOUS at 03:30

## 2019-01-01 RX ADMIN — Medication 10 ML: at 08:50

## 2019-01-01 RX ADMIN — ROPINIROLE HYDROCHLORIDE 1 MG: 1 TABLET, FILM COATED ORAL at 19:54

## 2019-01-01 RX ADMIN — METHYLPREDNISOLONE SODIUM SUCCINATE 40 MG: 40 INJECTION, POWDER, FOR SOLUTION INTRAMUSCULAR; INTRAVENOUS at 08:38

## 2019-01-01 RX ADMIN — METHYLPREDNISOLONE SODIUM SUCCINATE 40 MG: 40 INJECTION, POWDER, FOR SOLUTION INTRAMUSCULAR; INTRAVENOUS at 05:12

## 2019-01-01 RX ADMIN — ROPINIROLE HYDROCHLORIDE 1 MG: 1 TABLET, FILM COATED ORAL at 21:39

## 2019-01-01 RX ADMIN — ROPINIROLE HYDROCHLORIDE 1 MG: 1 TABLET, FILM COATED ORAL at 22:33

## 2019-01-01 RX ADMIN — METHYLPREDNISOLONE SODIUM SUCCINATE 40 MG: 40 INJECTION, POWDER, FOR SOLUTION INTRAMUSCULAR; INTRAVENOUS at 22:47

## 2019-01-01 RX ADMIN — Medication 500000 UNITS: at 05:33

## 2019-01-01 RX ADMIN — MINERAL OIL AND PETROLATUM: 150; 830 OINTMENT OPHTHALMIC at 09:00

## 2019-01-01 RX ADMIN — CEFEPIME 2 G: 2 INJECTION, POWDER, FOR SOLUTION INTRAVENOUS at 17:07

## 2019-01-01 RX ADMIN — Medication 20 MG: at 09:41

## 2019-01-01 RX ADMIN — VANCOMYCIN HYDROCHLORIDE 1000 MG: 1 INJECTION, SOLUTION INTRAVENOUS at 18:00

## 2019-01-01 RX ADMIN — ROPINIROLE HYDROCHLORIDE 1 MG: 1 TABLET, FILM COATED ORAL at 22:00

## 2019-01-01 RX ADMIN — Medication 500000 UNITS: at 05:56

## 2019-01-01 RX ADMIN — METHYLPREDNISOLONE SODIUM SUCCINATE 10 MG: 40 INJECTION, POWDER, FOR SOLUTION INTRAMUSCULAR; INTRAVENOUS at 08:27

## 2019-01-01 RX ADMIN — IPRATROPIUM BROMIDE AND ALBUTEROL SULFATE 1 AMPULE: 2.5; .5 SOLUTION RESPIRATORY (INHALATION) at 10:22

## 2019-01-01 RX ADMIN — PROPOFOL 50 MCG/KG/MIN: 10 INJECTION, EMULSION INTRAVENOUS at 02:30

## 2019-01-01 RX ADMIN — MORPHINE SULFATE 2 MG: 2 INJECTION, SOLUTION INTRAMUSCULAR; INTRAVENOUS at 03:34

## 2019-01-01 RX ADMIN — POLYVINYL ALCOHOL 1 DROP: 14 SOLUTION/ DROPS OPHTHALMIC at 16:53

## 2019-01-01 RX ADMIN — Medication 10 ML: at 08:56

## 2019-01-01 RX ADMIN — METHYLPREDNISOLONE SODIUM SUCCINATE 40 MG: 40 INJECTION, POWDER, FOR SOLUTION INTRAMUSCULAR; INTRAVENOUS at 16:34

## 2019-01-01 RX ADMIN — MIDAZOLAM 0.5 MG/HR: 5 INJECTION INTRAMUSCULAR; INTRAVENOUS at 16:38

## 2019-01-01 RX ADMIN — FUROSEMIDE 20 MG: 10 INJECTION, SOLUTION INTRAMUSCULAR; INTRAVENOUS at 08:38

## 2019-01-01 RX ADMIN — MIDAZOLAM 3 MG/HR: 5 INJECTION INTRAMUSCULAR; INTRAVENOUS at 14:03

## 2019-01-01 RX ADMIN — ALPRAZOLAM 0.25 MG: 0.25 TABLET ORAL at 22:51

## 2019-01-01 RX ADMIN — Medication 10 ML: at 08:16

## 2019-01-01 RX ADMIN — HYDROCODONE BITARTRATE AND ACETAMINOPHEN 1 TABLET: 10; 325 TABLET ORAL at 10:53

## 2019-01-01 RX ADMIN — ALPRAZOLAM 0.25 MG: 0.25 TABLET ORAL at 21:54

## 2019-01-01 RX ADMIN — IPRATROPIUM BROMIDE AND ALBUTEROL SULFATE 1 AMPULE: 2.5; .5 SOLUTION RESPIRATORY (INHALATION) at 18:32

## 2019-01-01 RX ADMIN — GABAPENTIN 300 MG: 300 CAPSULE ORAL at 09:43

## 2019-01-01 RX ADMIN — POLYVINYL ALCOHOL 1 DROP: 14 SOLUTION/ DROPS OPHTHALMIC at 20:03

## 2019-01-01 RX ADMIN — ALPRAZOLAM 0.25 MG: 0.25 TABLET ORAL at 05:37

## 2019-01-01 RX ADMIN — Medication 20 MG: at 08:48

## 2019-01-01 RX ADMIN — FUROSEMIDE 20 MG: 10 INJECTION, SOLUTION INTRAMUSCULAR; INTRAVENOUS at 08:48

## 2019-01-01 RX ADMIN — Medication 10 ML: at 19:57

## 2019-01-01 RX ADMIN — ROPINIROLE HYDROCHLORIDE 1 MG: 1 TABLET, FILM COATED ORAL at 14:15

## 2019-01-01 RX ADMIN — Medication 10 ML: at 08:25

## 2019-01-01 RX ADMIN — ALPRAZOLAM 0.25 MG: 0.25 TABLET ORAL at 22:24

## 2019-01-01 RX ADMIN — PROPOFOL 30 MCG/KG/MIN: 10 INJECTION, EMULSION INTRAVENOUS at 16:58

## 2019-01-01 RX ADMIN — POLYVINYL ALCOHOL 1 DROP: 14 SOLUTION/ DROPS OPHTHALMIC at 00:48

## 2019-01-01 RX ADMIN — ROPINIROLE HYDROCHLORIDE 1 MG: 1 TABLET, FILM COATED ORAL at 15:21

## 2019-01-01 RX ADMIN — Medication 500000 UNITS: at 04:26

## 2019-01-01 RX ADMIN — ATORVASTATIN CALCIUM 20 MG: 20 TABLET, FILM COATED ORAL at 22:33

## 2019-01-01 RX ADMIN — MINERAL OIL AND PETROLATUM: 150; 830 OINTMENT OPHTHALMIC at 09:43

## 2019-01-01 RX ADMIN — ROPINIROLE HYDROCHLORIDE 1 MG: 1 TABLET, FILM COATED ORAL at 13:50

## 2019-01-01 RX ADMIN — METHYLPREDNISOLONE SODIUM SUCCINATE 40 MG: 40 INJECTION, POWDER, FOR SOLUTION INTRAMUSCULAR; INTRAVENOUS at 16:41

## 2019-01-01 RX ADMIN — CEFEPIME 2 G: 2 INJECTION, POWDER, FOR SOLUTION INTRAVENOUS at 02:37

## 2019-01-01 RX ADMIN — POLYVINYL ALCOHOL 1 DROP: 14 SOLUTION/ DROPS OPHTHALMIC at 05:07

## 2019-01-01 RX ADMIN — FUROSEMIDE 20 MG: 10 INJECTION, SOLUTION INTRAMUSCULAR; INTRAVENOUS at 09:43

## 2019-01-01 RX ADMIN — IPRATROPIUM BROMIDE AND ALBUTEROL SULFATE 1 AMPULE: 2.5; .5 SOLUTION RESPIRATORY (INHALATION) at 14:31

## 2019-01-01 RX ADMIN — Medication 500000 UNITS: at 17:11

## 2019-01-01 RX ADMIN — METHYLPREDNISOLONE SODIUM SUCCINATE 40 MG: 40 INJECTION, POWDER, FOR SOLUTION INTRAMUSCULAR; INTRAVENOUS at 23:39

## 2019-01-01 RX ADMIN — IPRATROPIUM BROMIDE AND ALBUTEROL SULFATE 1 AMPULE: 2.5; .5 SOLUTION RESPIRATORY (INHALATION) at 14:11

## 2019-01-01 RX ADMIN — METHYLPREDNISOLONE SODIUM SUCCINATE 40 MG: 40 INJECTION, POWDER, FOR SOLUTION INTRAMUSCULAR; INTRAVENOUS at 22:16

## 2019-01-01 RX ADMIN — Medication 500000 UNITS: at 17:43

## 2019-01-01 RX ADMIN — METOPROLOL TARTRATE 25 MG: 25 TABLET ORAL at 08:19

## 2019-01-01 RX ADMIN — POLYVINYL ALCOHOL 1 DROP: 14 SOLUTION/ DROPS OPHTHALMIC at 23:00

## 2019-01-01 RX ADMIN — Medication 10 ML: at 19:53

## 2019-01-01 RX ADMIN — IPRATROPIUM BROMIDE AND ALBUTEROL SULFATE 1 AMPULE: 2.5; .5 SOLUTION RESPIRATORY (INHALATION) at 18:39

## 2019-01-01 RX ADMIN — METHYLPREDNISOLONE SODIUM SUCCINATE 40 MG: 40 INJECTION, POWDER, FOR SOLUTION INTRAMUSCULAR; INTRAVENOUS at 09:00

## 2019-01-01 RX ADMIN — IPRATROPIUM BROMIDE AND ALBUTEROL SULFATE 1 AMPULE: 2.5; .5 SOLUTION RESPIRATORY (INHALATION) at 06:33

## 2019-01-01 RX ADMIN — IPRATROPIUM BROMIDE AND ALBUTEROL SULFATE 1 AMPULE: 2.5; .5 SOLUTION RESPIRATORY (INHALATION) at 22:33

## 2019-01-01 RX ADMIN — Medication 500000 UNITS: at 12:17

## 2019-01-01 RX ADMIN — LEVOFLOXACIN 750 MG: 5 INJECTION, SOLUTION INTRAVENOUS at 17:00

## 2019-01-01 RX ADMIN — DEXTROSE, SODIUM CHLORIDE, AND POTASSIUM CHLORIDE: 5; .45; .15 INJECTION INTRAVENOUS at 23:30

## 2019-01-01 RX ADMIN — METHYLPREDNISOLONE SODIUM SUCCINATE 20 MG: 40 INJECTION, POWDER, FOR SOLUTION INTRAMUSCULAR; INTRAVENOUS at 20:03

## 2019-01-01 RX ADMIN — PROPOFOL 50 MCG/KG/MIN: 10 INJECTION, EMULSION INTRAVENOUS at 17:11

## 2019-01-01 RX ADMIN — IPRATROPIUM BROMIDE AND ALBUTEROL SULFATE 1 AMPULE: 2.5; .5 SOLUTION RESPIRATORY (INHALATION) at 22:53

## 2019-01-01 RX ADMIN — GABAPENTIN 300 MG: 300 CAPSULE ORAL at 20:29

## 2019-01-01 RX ADMIN — IPRATROPIUM BROMIDE AND ALBUTEROL SULFATE 1 AMPULE: 2.5; .5 SOLUTION RESPIRATORY (INHALATION) at 06:42

## 2019-01-01 RX ADMIN — ENOXAPARIN SODIUM 80 MG: 80 INJECTION SUBCUTANEOUS at 08:31

## 2019-01-01 RX ADMIN — IPRATROPIUM BROMIDE AND ALBUTEROL SULFATE 1 AMPULE: 2.5; .5 SOLUTION RESPIRATORY (INHALATION) at 15:04

## 2019-01-01 RX ADMIN — IPRATROPIUM BROMIDE AND ALBUTEROL SULFATE 1 AMPULE: 2.5; .5 SOLUTION RESPIRATORY (INHALATION) at 02:20

## 2019-01-01 RX ADMIN — CHLORHEXIDINE GLUCONATE 15 ML: 1.2 RINSE ORAL at 08:39

## 2019-01-01 RX ADMIN — LEVOFLOXACIN 750 MG: 5 INJECTION, SOLUTION INTRAVENOUS at 17:12

## 2019-01-01 RX ADMIN — BISACODYL 10 MG: 10 SUPPOSITORY RECTAL at 18:40

## 2019-01-01 RX ADMIN — Medication 500000 UNITS: at 10:58

## 2019-01-01 RX ADMIN — METHYLPREDNISOLONE SODIUM SUCCINATE 40 MG: 40 INJECTION, POWDER, FOR SOLUTION INTRAMUSCULAR; INTRAVENOUS at 22:00

## 2019-01-01 RX ADMIN — ROPINIROLE HYDROCHLORIDE 1 MG: 1 TABLET, FILM COATED ORAL at 15:17

## 2019-01-01 RX ADMIN — SODIUM CHLORIDE: 9 INJECTION, SOLUTION INTRAVENOUS at 03:56

## 2019-01-01 RX ADMIN — VANCOMYCIN HYDROCHLORIDE 1000 MG: 1 INJECTION, SOLUTION INTRAVENOUS at 21:27

## 2019-01-01 RX ADMIN — Medication 10 ML: at 22:34

## 2019-01-01 RX ADMIN — PROPOFOL 25 MCG/KG/MIN: 10 INJECTION, EMULSION INTRAVENOUS at 16:45

## 2019-01-01 RX ADMIN — ENOXAPARIN SODIUM 80 MG: 80 INJECTION SUBCUTANEOUS at 19:56

## 2019-01-01 RX ADMIN — MINERAL OIL AND PETROLATUM: 150; 830 OINTMENT OPHTHALMIC at 17:19

## 2019-01-01 RX ADMIN — FLUOXETINE HYDROCHLORIDE 20 MG: 20 CAPSULE ORAL at 08:14

## 2019-01-01 RX ADMIN — GABAPENTIN 300 MG: 300 CAPSULE ORAL at 08:27

## 2019-01-01 RX ADMIN — Medication 500000 UNITS: at 18:34

## 2019-01-01 RX ADMIN — IPRATROPIUM BROMIDE AND ALBUTEROL SULFATE 1 AMPULE: 2.5; .5 SOLUTION RESPIRATORY (INHALATION) at 18:49

## 2019-01-01 RX ADMIN — MORPHINE SULFATE 2 MG: 2 INJECTION, SOLUTION INTRAMUSCULAR; INTRAVENOUS at 12:30

## 2019-01-01 RX ADMIN — LEVOFLOXACIN 750 MG: 5 INJECTION, SOLUTION INTRAVENOUS at 17:07

## 2019-01-01 RX ADMIN — FAMOTIDINE 20 MG: 10 INJECTION INTRAVENOUS at 21:26

## 2019-01-01 RX ADMIN — FAMOTIDINE 20 MG: 10 INJECTION INTRAVENOUS at 09:32

## 2019-01-01 RX ADMIN — IPRATROPIUM BROMIDE AND ALBUTEROL SULFATE 1 AMPULE: 2.5; .5 SOLUTION RESPIRATORY (INHALATION) at 10:11

## 2019-01-01 RX ADMIN — GABAPENTIN 300 MG: 300 CAPSULE ORAL at 20:03

## 2019-01-01 RX ADMIN — DOCUSATE SODIUM 100 MG: 100 CAPSULE, LIQUID FILLED ORAL at 09:15

## 2019-01-01 RX ADMIN — CEFEPIME 2 G: 2 INJECTION, POWDER, FOR SOLUTION INTRAVENOUS at 00:48

## 2019-01-01 RX ADMIN — DICYCLOMINE HYDROCHLORIDE 10 MG: 10 CAPSULE ORAL at 15:17

## 2019-01-01 RX ADMIN — ROPINIROLE HYDROCHLORIDE 1 MG: 1 TABLET, FILM COATED ORAL at 14:19

## 2019-01-01 RX ADMIN — IPRATROPIUM BROMIDE AND ALBUTEROL SULFATE 1 AMPULE: 2.5; .5 SOLUTION RESPIRATORY (INHALATION) at 22:42

## 2019-01-01 RX ADMIN — ENOXAPARIN SODIUM 80 MG: 80 INJECTION SUBCUTANEOUS at 08:16

## 2019-01-01 RX ADMIN — Medication 10 ML: at 20:03

## 2019-01-01 RX ADMIN — CEFEPIME 2 G: 2 INJECTION, POWDER, FOR SOLUTION INTRAVENOUS at 08:38

## 2019-01-01 RX ADMIN — CEFEPIME 2 G: 2 INJECTION, POWDER, FOR SOLUTION INTRAVENOUS at 08:48

## 2019-01-01 RX ADMIN — VANCOMYCIN HYDROCHLORIDE 1000 MG: 1 INJECTION, SOLUTION INTRAVENOUS at 05:08

## 2019-01-01 RX ADMIN — SODIUM CHLORIDE: 9 INJECTION, SOLUTION INTRAVENOUS at 16:38

## 2019-01-01 RX ADMIN — GABAPENTIN 300 MG: 300 CAPSULE ORAL at 08:37

## 2019-01-01 RX ADMIN — IPRATROPIUM BROMIDE AND ALBUTEROL SULFATE 1 AMPULE: 2.5; .5 SOLUTION RESPIRATORY (INHALATION) at 14:10

## 2019-01-01 RX ADMIN — MORPHINE SULFATE 2 MG: 2 INJECTION, SOLUTION INTRAMUSCULAR; INTRAVENOUS at 22:49

## 2019-01-01 RX ADMIN — METHYLPREDNISOLONE SODIUM SUCCINATE 40 MG: 40 INJECTION, POWDER, FOR SOLUTION INTRAMUSCULAR; INTRAVENOUS at 04:27

## 2019-01-01 RX ADMIN — POLYVINYL ALCOHOL 1 DROP: 14 SOLUTION/ DROPS OPHTHALMIC at 04:50

## 2019-01-01 RX ADMIN — IPRATROPIUM BROMIDE AND ALBUTEROL SULFATE 1 AMPULE: 2.5; .5 SOLUTION RESPIRATORY (INHALATION) at 06:38

## 2019-01-01 RX ADMIN — IPRATROPIUM BROMIDE AND ALBUTEROL SULFATE 1 AMPULE: 2.5; .5 SOLUTION RESPIRATORY (INHALATION) at 06:44

## 2019-01-01 RX ADMIN — POLYVINYL ALCOHOL 1 DROP: 14 SOLUTION/ DROPS OPHTHALMIC at 08:28

## 2019-01-01 RX ADMIN — IPRATROPIUM BROMIDE AND ALBUTEROL SULFATE 1 AMPULE: 2.5; .5 SOLUTION RESPIRATORY (INHALATION) at 02:04

## 2019-01-01 RX ADMIN — ROPINIROLE HYDROCHLORIDE 1 MG: 1 TABLET, FILM COATED ORAL at 13:12

## 2019-01-01 RX ADMIN — FOLIC ACID 1 MG: 1 TABLET ORAL at 08:02

## 2019-01-01 RX ADMIN — HYDROCODONE BITARTRATE AND ACETAMINOPHEN 1 TABLET: 10; 325 TABLET ORAL at 08:22

## 2019-01-01 RX ADMIN — IPRATROPIUM BROMIDE AND ALBUTEROL SULFATE 1 AMPULE: 2.5; .5 SOLUTION RESPIRATORY (INHALATION) at 22:34

## 2019-01-01 RX ADMIN — IPRATROPIUM BROMIDE AND ALBUTEROL SULFATE 1 AMPULE: 2.5; .5 SOLUTION RESPIRATORY (INHALATION) at 07:21

## 2019-01-01 RX ADMIN — IPRATROPIUM BROMIDE AND ALBUTEROL SULFATE 1 AMPULE: 2.5; .5 SOLUTION RESPIRATORY (INHALATION) at 21:50

## 2019-01-01 RX ADMIN — IPRATROPIUM BROMIDE AND ALBUTEROL SULFATE 1 AMPULE: 2.5; .5 SOLUTION RESPIRATORY (INHALATION) at 14:17

## 2019-01-01 RX ADMIN — ROPINIROLE HYDROCHLORIDE 1 MG: 1 TABLET, FILM COATED ORAL at 13:33

## 2019-01-01 RX ADMIN — PROPOFOL 30 MCG/KG/MIN: 10 INJECTION, EMULSION INTRAVENOUS at 09:07

## 2019-01-01 RX ADMIN — FOLIC ACID 1 MG: 1 TABLET ORAL at 08:30

## 2019-01-01 RX ADMIN — METHYLPREDNISOLONE SODIUM SUCCINATE 40 MG: 40 INJECTION, POWDER, FOR SOLUTION INTRAMUSCULAR; INTRAVENOUS at 09:31

## 2019-01-01 RX ADMIN — HYDROCODONE BITARTRATE AND ACETAMINOPHEN 1 TABLET: 10; 325 TABLET ORAL at 04:25

## 2019-01-01 RX ADMIN — IPRATROPIUM BROMIDE AND ALBUTEROL SULFATE 1 AMPULE: 2.5; .5 SOLUTION RESPIRATORY (INHALATION) at 22:36

## 2019-01-01 RX ADMIN — ROPINIROLE HYDROCHLORIDE 1 MG: 1 TABLET, FILM COATED ORAL at 13:02

## 2019-01-01 RX ADMIN — IPRATROPIUM BROMIDE AND ALBUTEROL SULFATE 1 AMPULE: 2.5; .5 SOLUTION RESPIRATORY (INHALATION) at 22:17

## 2019-01-01 RX ADMIN — Medication 10 ML: at 08:39

## 2019-01-01 RX ADMIN — ENOXAPARIN SODIUM 80 MG: 80 INJECTION SUBCUTANEOUS at 20:12

## 2019-01-01 RX ADMIN — IPRATROPIUM BROMIDE AND ALBUTEROL SULFATE 1 AMPULE: 2.5; .5 SOLUTION RESPIRATORY (INHALATION) at 10:38

## 2019-01-01 RX ADMIN — Medication 500000 UNITS: at 16:59

## 2019-01-01 RX ADMIN — IPRATROPIUM BROMIDE AND ALBUTEROL SULFATE 1 AMPULE: 2.5; .5 SOLUTION RESPIRATORY (INHALATION) at 14:41

## 2019-01-01 RX ADMIN — Medication 500000 UNITS: at 05:44

## 2019-01-01 RX ADMIN — ENOXAPARIN SODIUM 80 MG: 80 INJECTION SUBCUTANEOUS at 22:34

## 2019-01-01 RX ADMIN — POLYVINYL ALCOHOL 1 DROP: 14 SOLUTION/ DROPS OPHTHALMIC at 11:15

## 2019-01-01 RX ADMIN — MIDAZOLAM HYDROCHLORIDE 1 MG: 2 INJECTION, SOLUTION INTRAMUSCULAR; INTRAVENOUS at 15:21

## 2019-01-01 RX ADMIN — ALPRAZOLAM 0.25 MG: 0.25 TABLET ORAL at 22:20

## 2019-01-01 RX ADMIN — IPRATROPIUM BROMIDE AND ALBUTEROL SULFATE 1 AMPULE: 2.5; .5 SOLUTION RESPIRATORY (INHALATION) at 07:16

## 2019-01-01 RX ADMIN — POLYVINYL ALCOHOL 1 DROP: 14 SOLUTION/ DROPS OPHTHALMIC at 11:02

## 2019-01-01 RX ADMIN — POLYVINYL ALCOHOL 1 DROP: 14 SOLUTION/ DROPS OPHTHALMIC at 01:11

## 2019-01-01 RX ADMIN — METOPROLOL TARTRATE 25 MG: 25 TABLET ORAL at 08:20

## 2019-01-01 RX ADMIN — METHYLPREDNISOLONE SODIUM SUCCINATE 40 MG: 40 INJECTION, POWDER, FOR SOLUTION INTRAMUSCULAR; INTRAVENOUS at 15:17

## 2019-01-01 RX ADMIN — ATORVASTATIN CALCIUM 20 MG: 20 TABLET, FILM COATED ORAL at 20:12

## 2019-01-01 RX ADMIN — METHYLPREDNISOLONE SODIUM SUCCINATE 40 MG: 40 INJECTION, POWDER, FOR SOLUTION INTRAMUSCULAR; INTRAVENOUS at 18:17

## 2019-01-01 RX ADMIN — FLUOXETINE HYDROCHLORIDE 20 MG: 20 CAPSULE ORAL at 08:11

## 2019-01-01 RX ADMIN — ROPINIROLE HYDROCHLORIDE 1 MG: 1 TABLET, FILM COATED ORAL at 09:43

## 2019-01-01 RX ADMIN — POLYVINYL ALCOHOL 1 DROP: 14 SOLUTION/ DROPS OPHTHALMIC at 09:44

## 2019-01-01 RX ADMIN — HYDROCODONE BITARTRATE AND ACETAMINOPHEN 1 TABLET: 10; 325 TABLET ORAL at 22:24

## 2019-01-01 RX ADMIN — IPRATROPIUM BROMIDE AND ALBUTEROL SULFATE 1 AMPULE: 2.5; .5 SOLUTION RESPIRATORY (INHALATION) at 10:43

## 2019-01-01 RX ADMIN — HYDROCODONE BITARTRATE AND ACETAMINOPHEN 1 TABLET: 10; 325 TABLET ORAL at 14:24

## 2019-01-01 RX ADMIN — Medication 10 ML: at 19:37

## 2019-01-01 RX ADMIN — IPRATROPIUM BROMIDE AND ALBUTEROL SULFATE 1 AMPULE: 2.5; .5 SOLUTION RESPIRATORY (INHALATION) at 06:52

## 2019-01-01 RX ADMIN — ENOXAPARIN SODIUM 70 MG: 80 INJECTION SUBCUTANEOUS at 19:37

## 2019-01-01 RX ADMIN — HYDROCODONE BITARTRATE AND ACETAMINOPHEN 1 TABLET: 10; 325 TABLET ORAL at 08:02

## 2019-01-01 RX ADMIN — Medication 10 ML: at 09:29

## 2019-01-01 RX ADMIN — METHYLPREDNISOLONE SODIUM SUCCINATE 20 MG: 40 INJECTION, POWDER, FOR SOLUTION INTRAMUSCULAR; INTRAVENOUS at 09:42

## 2019-01-01 RX ADMIN — METHYLPREDNISOLONE SODIUM SUCCINATE 40 MG: 40 INJECTION, POWDER, FOR SOLUTION INTRAMUSCULAR; INTRAVENOUS at 00:49

## 2019-01-01 RX ADMIN — IPRATROPIUM BROMIDE AND ALBUTEROL SULFATE 1 AMPULE: 2.5; .5 SOLUTION RESPIRATORY (INHALATION) at 02:43

## 2019-01-01 RX ADMIN — PROPOFOL 10 MCG/KG/MIN: 10 INJECTION, EMULSION INTRAVENOUS at 11:05

## 2019-01-01 RX ADMIN — ATORVASTATIN CALCIUM 20 MG: 20 TABLET, FILM COATED ORAL at 19:36

## 2019-01-01 RX ADMIN — FAMOTIDINE 20 MG: 10 INJECTION INTRAVENOUS at 09:43

## 2019-01-01 RX ADMIN — MORPHINE SULFATE 1 MG: 2 INJECTION, SOLUTION INTRAMUSCULAR; INTRAVENOUS at 08:45

## 2019-01-01 RX ADMIN — FLUOXETINE HYDROCHLORIDE 20 MG: 20 CAPSULE ORAL at 08:28

## 2019-01-01 RX ADMIN — POLYVINYL ALCOHOL 1 DROP: 14 SOLUTION/ DROPS OPHTHALMIC at 12:11

## 2019-01-01 RX ADMIN — IPRATROPIUM BROMIDE AND ALBUTEROL SULFATE 1 AMPULE: 2.5; .5 SOLUTION RESPIRATORY (INHALATION) at 06:32

## 2019-01-01 RX ADMIN — FAMOTIDINE 20 MG: 10 INJECTION INTRAVENOUS at 22:00

## 2019-01-01 RX ADMIN — ENOXAPARIN SODIUM 80 MG: 80 INJECTION SUBCUTANEOUS at 08:02

## 2019-01-01 RX ADMIN — ENOXAPARIN SODIUM 80 MG: 80 INJECTION SUBCUTANEOUS at 09:41

## 2019-01-01 RX ADMIN — ENOXAPARIN SODIUM 80 MG: 80 INJECTION SUBCUTANEOUS at 08:10

## 2019-01-01 RX ADMIN — IPRATROPIUM BROMIDE AND ALBUTEROL SULFATE 1 AMPULE: 2.5; .5 SOLUTION RESPIRATORY (INHALATION) at 06:11

## 2019-01-01 RX ADMIN — PROPOFOL 20 MCG/KG/MIN: 10 INJECTION, EMULSION INTRAVENOUS at 05:07

## 2019-01-01 RX ADMIN — FOLIC ACID 1 MG: 1 TABLET ORAL at 08:28

## 2019-01-01 RX ADMIN — ROPINIROLE HYDROCHLORIDE 1 MG: 1 TABLET, FILM COATED ORAL at 15:34

## 2019-01-01 RX ADMIN — METOPROLOL TARTRATE 25 MG: 25 TABLET ORAL at 19:56

## 2019-01-01 RX ADMIN — Medication 500000 UNITS: at 18:58

## 2019-01-01 RX ADMIN — MINERAL OIL AND PETROLATUM: 150; 830 OINTMENT OPHTHALMIC at 06:26

## 2019-01-01 RX ADMIN — POLYVINYL ALCOHOL 1 DROP: 14 SOLUTION/ DROPS OPHTHALMIC at 03:23

## 2019-01-01 RX ADMIN — Medication 10 ML: at 09:00

## 2019-01-01 RX ADMIN — PROPOFOL 25 MCG/KG/MIN: 10 INJECTION, EMULSION INTRAVENOUS at 03:46

## 2019-01-01 RX ADMIN — FAMOTIDINE 20 MG: 10 INJECTION INTRAVENOUS at 08:28

## 2019-01-01 RX ADMIN — METHYLPREDNISOLONE SODIUM SUCCINATE 40 MG: 40 INJECTION, POWDER, FOR SOLUTION INTRAMUSCULAR; INTRAVENOUS at 20:31

## 2019-01-01 RX ADMIN — FAMOTIDINE 20 MG: 10 INJECTION INTRAVENOUS at 08:48

## 2019-01-01 RX ADMIN — METHYLPREDNISOLONE SODIUM SUCCINATE 40 MG: 40 INJECTION, POWDER, FOR SOLUTION INTRAMUSCULAR; INTRAVENOUS at 09:18

## 2019-01-01 RX ADMIN — IPRATROPIUM BROMIDE AND ALBUTEROL SULFATE 1 AMPULE: 2.5; .5 SOLUTION RESPIRATORY (INHALATION) at 10:33

## 2019-01-01 RX ADMIN — POLYVINYL ALCOHOL 1 DROP: 14 SOLUTION/ DROPS OPHTHALMIC at 20:00

## 2019-01-01 RX ADMIN — IPRATROPIUM BROMIDE AND ALBUTEROL SULFATE 1 AMPULE: 2.5; .5 SOLUTION RESPIRATORY (INHALATION) at 07:36

## 2019-01-01 RX ADMIN — ENOXAPARIN SODIUM 70 MG: 80 INJECTION SUBCUTANEOUS at 08:49

## 2019-01-01 RX ADMIN — IPRATROPIUM BROMIDE AND ALBUTEROL SULFATE 1 AMPULE: 2.5; .5 SOLUTION RESPIRATORY (INHALATION) at 18:23

## 2019-01-01 RX ADMIN — IPRATROPIUM BROMIDE AND ALBUTEROL SULFATE 1 AMPULE: 2.5; .5 SOLUTION RESPIRATORY (INHALATION) at 02:02

## 2019-01-01 RX ADMIN — FOLIC ACID 1 MG: 1 TABLET ORAL at 08:44

## 2019-01-01 RX ADMIN — POLYVINYL ALCOHOL 1 DROP: 14 SOLUTION/ DROPS OPHTHALMIC at 15:15

## 2019-01-01 RX ADMIN — Medication 10 ML: at 21:16

## 2019-01-01 RX ADMIN — ATORVASTATIN CALCIUM 20 MG: 20 TABLET, FILM COATED ORAL at 20:29

## 2019-01-01 RX ADMIN — Medication 10 ML: at 09:14

## 2019-01-01 RX ADMIN — ENOXAPARIN SODIUM 80 MG: 80 INJECTION SUBCUTANEOUS at 08:28

## 2019-01-01 RX ADMIN — MINERAL OIL AND PETROLATUM: 150; 830 OINTMENT OPHTHALMIC at 20:29

## 2019-01-01 RX ADMIN — IPRATROPIUM BROMIDE AND ALBUTEROL SULFATE 1 AMPULE: 2.5; .5 SOLUTION RESPIRATORY (INHALATION) at 06:39

## 2019-01-01 RX ADMIN — IPRATROPIUM BROMIDE AND ALBUTEROL SULFATE 1 AMPULE: 2.5; .5 SOLUTION RESPIRATORY (INHALATION) at 14:39

## 2019-01-01 RX ADMIN — IPRATROPIUM BROMIDE AND ALBUTEROL SULFATE 1 AMPULE: 2.5; .5 SOLUTION RESPIRATORY (INHALATION) at 18:47

## 2019-01-01 RX ADMIN — Medication 500000 UNITS: at 05:05

## 2019-01-01 RX ADMIN — VANCOMYCIN HYDROCHLORIDE 1000 MG: 1 INJECTION, SOLUTION INTRAVENOUS at 11:02

## 2019-01-01 RX ADMIN — ROPINIROLE HYDROCHLORIDE 1 MG: 1 TABLET, FILM COATED ORAL at 08:49

## 2019-01-01 RX ADMIN — METOPROLOL TARTRATE 25 MG: 25 TABLET ORAL at 08:50

## 2019-01-01 RX ADMIN — Medication 10 ML: at 00:45

## 2019-01-01 RX ADMIN — VANCOMYCIN HYDROCHLORIDE 1000 MG: 1 INJECTION, SOLUTION INTRAVENOUS at 09:00

## 2019-01-01 RX ADMIN — Medication 500000 UNITS: at 05:21

## 2019-01-01 RX ADMIN — Medication 500000 UNITS: at 05:37

## 2019-01-01 RX ADMIN — MINERAL OIL AND PETROLATUM: 150; 830 OINTMENT OPHTHALMIC at 20:04

## 2019-01-01 RX ADMIN — Medication 10 ML: at 21:39

## 2019-01-01 RX ADMIN — IPRATROPIUM BROMIDE AND ALBUTEROL SULFATE 1 AMPULE: 2.5; .5 SOLUTION RESPIRATORY (INHALATION) at 02:40

## 2019-01-01 RX ADMIN — MORPHINE SULFATE 2 MG: 2 INJECTION, SOLUTION INTRAMUSCULAR; INTRAVENOUS at 20:15

## 2019-01-01 RX ADMIN — MINERAL OIL AND PETROLATUM: 150; 830 OINTMENT OPHTHALMIC at 21:30

## 2019-01-01 RX ADMIN — CLONAZEPAM 0.5 MG: 0.5 TABLET ORAL at 19:36

## 2019-01-01 RX ADMIN — POLYVINYL ALCOHOL 1 DROP: 14 SOLUTION/ DROPS OPHTHALMIC at 15:08

## 2019-01-01 RX ADMIN — ROPINIROLE HYDROCHLORIDE 1 MG: 1 TABLET, FILM COATED ORAL at 09:15

## 2019-01-01 RX ADMIN — PROPOFOL 30 MCG/KG/MIN: 10 INJECTION, EMULSION INTRAVENOUS at 07:49

## 2019-01-01 RX ADMIN — FOLIC ACID 1 MG: 1 TABLET ORAL at 08:14

## 2019-01-01 RX ADMIN — Medication 20 MG: at 08:38

## 2019-01-01 RX ADMIN — METOPROLOL TARTRATE 25 MG: 25 TABLET ORAL at 21:39

## 2019-01-01 RX ADMIN — FAMOTIDINE 20 MG: 10 INJECTION INTRAVENOUS at 22:11

## 2019-01-01 RX ADMIN — METHYLPREDNISOLONE SODIUM SUCCINATE 40 MG: 40 INJECTION, POWDER, FOR SOLUTION INTRAMUSCULAR; INTRAVENOUS at 10:53

## 2019-01-01 RX ADMIN — FLUOXETINE HYDROCHLORIDE 20 MG: 20 CAPSULE ORAL at 09:15

## 2019-01-01 RX ADMIN — DEXTROSE, SODIUM CHLORIDE, AND POTASSIUM CHLORIDE: 5; .45; .15 INJECTION INTRAVENOUS at 13:48

## 2019-01-01 RX ADMIN — POLYVINYL ALCOHOL 1 DROP: 14 SOLUTION/ DROPS OPHTHALMIC at 12:22

## 2019-01-01 RX ADMIN — HYDROCODONE BITARTRATE AND ACETAMINOPHEN 1 TABLET: 10; 325 TABLET ORAL at 21:54

## 2019-01-01 RX ADMIN — FAMOTIDINE 20 MG: 10 INJECTION INTRAVENOUS at 08:02

## 2019-01-01 RX ADMIN — ENOXAPARIN SODIUM 80 MG: 80 INJECTION SUBCUTANEOUS at 08:39

## 2019-01-01 RX ADMIN — IPRATROPIUM BROMIDE AND ALBUTEROL SULFATE 1 AMPULE: 2.5; .5 SOLUTION RESPIRATORY (INHALATION) at 10:58

## 2019-01-01 RX ADMIN — HYDROCODONE BITARTRATE AND ACETAMINOPHEN 1 TABLET: 10; 325 TABLET ORAL at 13:03

## 2019-01-01 RX ADMIN — MORPHINE SULFATE 2 MG: 2 INJECTION, SOLUTION INTRAMUSCULAR; INTRAVENOUS at 19:11

## 2019-01-01 RX ADMIN — DEXTROSE, SODIUM CHLORIDE, AND POTASSIUM CHLORIDE: 5; .45; .15 INJECTION INTRAVENOUS at 01:00

## 2019-01-01 RX ADMIN — DOCUSATE SODIUM 100 MG: 100 CAPSULE, LIQUID FILLED ORAL at 08:25

## 2019-01-01 RX ADMIN — IPRATROPIUM BROMIDE AND ALBUTEROL SULFATE 1 AMPULE: 2.5; .5 SOLUTION RESPIRATORY (INHALATION) at 11:27

## 2019-01-01 RX ADMIN — Medication 10 ML: at 12:32

## 2019-01-01 RX ADMIN — CLONAZEPAM 0.5 MG: 0.5 TABLET ORAL at 20:29

## 2019-01-01 RX ADMIN — CEFEPIME 2 G: 2 INJECTION, POWDER, FOR SOLUTION INTRAVENOUS at 01:09

## 2019-01-01 RX ADMIN — ENOXAPARIN SODIUM 80 MG: 80 INJECTION SUBCUTANEOUS at 20:30

## 2019-01-01 RX ADMIN — Medication 500000 UNITS: at 14:08

## 2019-01-01 RX ADMIN — CHLORHEXIDINE GLUCONATE 15 ML: 1.2 RINSE ORAL at 20:02

## 2019-01-01 RX ADMIN — Medication 10 MG: at 06:00

## 2019-01-01 RX ADMIN — FUROSEMIDE 20 MG: 10 INJECTION, SOLUTION INTRAMUSCULAR; INTRAVENOUS at 17:01

## 2019-01-01 RX ADMIN — ROPINIROLE HYDROCHLORIDE 1 MG: 1 TABLET, FILM COATED ORAL at 19:36

## 2019-01-01 RX ADMIN — POLYVINYL ALCOHOL 1 DROP: 14 SOLUTION/ DROPS OPHTHALMIC at 19:02

## 2019-01-01 RX ADMIN — MINERAL OIL AND PETROLATUM: 150; 830 OINTMENT OPHTHALMIC at 01:28

## 2019-01-01 RX ADMIN — CHLORHEXIDINE GLUCONATE 15 ML: 1.2 RINSE ORAL at 21:26

## 2019-01-01 RX ADMIN — ROPINIROLE HYDROCHLORIDE 1 MG: 1 TABLET, FILM COATED ORAL at 19:52

## 2019-01-01 RX ADMIN — IPRATROPIUM BROMIDE AND ALBUTEROL SULFATE 1 AMPULE: 2.5; .5 SOLUTION RESPIRATORY (INHALATION) at 18:20

## 2019-01-01 RX ADMIN — METOPROLOL TARTRATE 25 MG: 25 TABLET ORAL at 22:34

## 2019-01-01 RX ADMIN — ATORVASTATIN CALCIUM 20 MG: 20 TABLET, FILM COATED ORAL at 20:03

## 2019-01-01 RX ADMIN — FLUOXETINE HYDROCHLORIDE 20 MG: 20 CAPSULE ORAL at 08:50

## 2019-01-01 RX ADMIN — FAMOTIDINE 20 MG: 10 INJECTION INTRAVENOUS at 20:12

## 2019-01-01 RX ADMIN — IPRATROPIUM BROMIDE AND ALBUTEROL SULFATE 1 AMPULE: 2.5; .5 SOLUTION RESPIRATORY (INHALATION) at 20:08

## 2019-01-01 RX ADMIN — METHYLPREDNISOLONE SODIUM SUCCINATE 20 MG: 40 INJECTION, POWDER, FOR SOLUTION INTRAMUSCULAR; INTRAVENOUS at 20:29

## 2019-01-01 RX ADMIN — IPRATROPIUM BROMIDE AND ALBUTEROL SULFATE 1 AMPULE: 2.5; .5 SOLUTION RESPIRATORY (INHALATION) at 06:55

## 2019-01-01 RX ADMIN — CEFEPIME 2 G: 2 INJECTION, POWDER, FOR SOLUTION INTRAVENOUS at 16:34

## 2019-01-01 RX ADMIN — METHYLPREDNISOLONE SODIUM SUCCINATE 10 MG: 40 INJECTION, POWDER, FOR SOLUTION INTRAMUSCULAR; INTRAVENOUS at 20:03

## 2019-01-01 RX ADMIN — METHYLPREDNISOLONE SODIUM SUCCINATE 40 MG: 40 INJECTION, POWDER, FOR SOLUTION INTRAMUSCULAR; INTRAVENOUS at 17:00

## 2019-01-01 RX ADMIN — GABAPENTIN 300 MG: 300 CAPSULE ORAL at 14:45

## 2019-01-01 RX ADMIN — HYDROCODONE BITARTRATE AND ACETAMINOPHEN 1 TABLET: 10; 325 TABLET ORAL at 22:58

## 2019-01-01 RX ADMIN — Medication 10 ML: at 05:37

## 2019-01-01 ASSESSMENT — PULMONARY FUNCTION TESTS
PIF_VALUE: 37.4
PIF_VALUE: 41.5
PIF_VALUE: 38.7
PIF_VALUE: 33.8
PIF_VALUE: 32.1
PIF_VALUE: 40.3
PIF_VALUE: 35.2
PIF_VALUE: 44.9
PIF_VALUE: 40.9
PIF_VALUE: 46.7
PIF_VALUE: 26.7
PIF_VALUE: 38.7
PIF_VALUE: 36.6
PIF_VALUE: 50.3
PIF_VALUE: 39.1
PIF_VALUE: 40
PIF_VALUE: 30.2
PIF_VALUE: 41.7
PIF_VALUE: 41.9
PIF_VALUE: 30.2
PIF_VALUE: 23.6
PIF_VALUE: 21.5
PIF_VALUE: 33.3
PIF_VALUE: 41.1
PIF_VALUE: 52.6
PIF_VALUE: 37.1
PIF_VALUE: 11.2
PIF_VALUE: 39.1
PIF_VALUE: 38.4
PIF_VALUE: 34.1
PIF_VALUE: 31.7
PIF_VALUE: 39.9
PIF_VALUE: 33.7
PIF_VALUE: 29.1
PIF_VALUE: 26.3
PIF_VALUE: 36.8
PIF_VALUE: 32
PIF_VALUE: 35.2
PIF_VALUE: 19.5
PIF_VALUE: 42.5
PIF_VALUE: 37.6
PIF_VALUE: 41.9
PIF_VALUE: 35.5
PIF_VALUE: 36.1
PIF_VALUE: 37.6
PIF_VALUE: 34.1
PIF_VALUE: 42.1
PIF_VALUE: 39.2
PIF_VALUE: 41.9
PIF_VALUE: 41.2
PIF_VALUE: 57
PIF_VALUE: 40.5
PIF_VALUE: 39.1
PIF_VALUE: 48.2
PIF_VALUE: 28.8
PIF_VALUE: 39.1
PIF_VALUE: 21.4
PIF_VALUE: 33.4
PIF_VALUE: 40.2
PIF_VALUE: 30.8
PIF_VALUE: 46.7
PIF_VALUE: 36.3
PIF_VALUE: 37.5
PIF_VALUE: 24.9
PIF_VALUE: 21.3
PIF_VALUE: 40.9
PIF_VALUE: 39.7
PIF_VALUE: 52.1
PIF_VALUE: 31.5
PIF_VALUE: 23.8
PIF_VALUE: 38.9
PIF_VALUE: 23.9
PIF_VALUE: 54.1
PIF_VALUE: 40.9
PIF_VALUE: 35.3
PIF_VALUE: 30.8
PIF_VALUE: 33.9
PIF_VALUE: 31.6
PIF_VALUE: 40.4
PIF_VALUE: 48
PIF_VALUE: 41.4
PIF_VALUE: 18.2
PIF_VALUE: 43.7
PIF_VALUE: 33.2
PIF_VALUE: 29.5
PIF_VALUE: 49
PIF_VALUE: 40.1
PIF_VALUE: 40.8
PIF_VALUE: 41.2
PIF_VALUE: 42
PIF_VALUE: 40.8
PIF_VALUE: 24.5
PIF_VALUE: 45.8
PIF_VALUE: 45.3
PIF_VALUE: 30.5
PIF_VALUE: 36.4
PIF_VALUE: 53.8
PIF_VALUE: 33
PIF_VALUE: 33.6
PIF_VALUE: 53.9
PIF_VALUE: 57.5
PIF_VALUE: 25.5
PIF_VALUE: 35.8
PIF_VALUE: 35.1
PIF_VALUE: 18.1
PIF_VALUE: 40.9
PIF_VALUE: 24.3
PIF_VALUE: 37.1
PIF_VALUE: 36
PIF_VALUE: 40.9
PIF_VALUE: 64.7
PIF_VALUE: 26.6
PIF_VALUE: 29.2
PIF_VALUE: 29.7
PIF_VALUE: 30.7
PIF_VALUE: 23.6
PIF_VALUE: 42.2
PIF_VALUE: 36.3
PIF_VALUE: 20.9
PIF_VALUE: 31
PIF_VALUE: 40.7
PIF_VALUE: 35.8
PIF_VALUE: 36.8
PIF_VALUE: 39.9
PIF_VALUE: 22.9
PIF_VALUE: 22.5
PIF_VALUE: 23.1
PIF_VALUE: 42
PIF_VALUE: 41
PIF_VALUE: 41.3
PIF_VALUE: 20.6
PIF_VALUE: 48.3
PIF_VALUE: 13.8
PIF_VALUE: 28.9
PIF_VALUE: 23.5
PIF_VALUE: 33
PIF_VALUE: 22.1
PIF_VALUE: 50.6
PIF_VALUE: 44.1
PIF_VALUE: 36.5
PIF_VALUE: 33.3
PIF_VALUE: 39.1
PIF_VALUE: 34.3
PIF_VALUE: 19.2
PIF_VALUE: 25.4
PIF_VALUE: 43.5
PIF_VALUE: 30.4
PIF_VALUE: 31.9
PIF_VALUE: 18.6
PIF_VALUE: 47.5
PIF_VALUE: 37.8
PIF_VALUE: 22.7
PIF_VALUE: 28.6
PIF_VALUE: 31.1
PIF_VALUE: 36.4
PIF_VALUE: 85.7
PIF_VALUE: 39.6
PIF_VALUE: 41.8
PIF_VALUE: 20.1
PIF_VALUE: 37.9
PIF_VALUE: 34.7
PIF_VALUE: 39.7
PIF_VALUE: 35.2
PIF_VALUE: 25.4
PIF_VALUE: 32.6
PIF_VALUE: 43.2
PIF_VALUE: 55.6

## 2019-01-01 ASSESSMENT — PAIN SCALES - GENERAL
PAINLEVEL_OUTOF10: 6
PAINLEVEL_OUTOF10: 0
PAINLEVEL_OUTOF10: 5
PAINLEVEL_OUTOF10: 0
PAINLEVEL_OUTOF10: 0
PAINLEVEL_OUTOF10: 5
PAINLEVEL_OUTOF10: 6
PAINLEVEL_OUTOF10: 2
PAINLEVEL_OUTOF10: 0
PAINLEVEL_OUTOF10: 6
PAINLEVEL_OUTOF10: 6
PAINLEVEL_OUTOF10: 0
PAINLEVEL_OUTOF10: 6
PAINLEVEL_OUTOF10: 8
PAINLEVEL_OUTOF10: 6
PAINLEVEL_OUTOF10: 5
PAINLEVEL_OUTOF10: 8
PAINLEVEL_OUTOF10: 0
PAINLEVEL_OUTOF10: 7
PAINLEVEL_OUTOF10: 2
PAINLEVEL_OUTOF10: 0
PAINLEVEL_OUTOF10: 5
PAINLEVEL_OUTOF10: 4
PAINLEVEL_OUTOF10: 8
PAINLEVEL_OUTOF10: 4
PAINLEVEL_OUTOF10: 3
PAINLEVEL_OUTOF10: 5
PAINLEVEL_OUTOF10: 0
PAINLEVEL_OUTOF10: 2
PAINLEVEL_OUTOF10: 9
PAINLEVEL_OUTOF10: 0
PAINLEVEL_OUTOF10: 10
PAINLEVEL_OUTOF10: 8
PAINLEVEL_OUTOF10: 10
PAINLEVEL_OUTOF10: 7
PAINLEVEL_OUTOF10: 7
PAINLEVEL_OUTOF10: 2
PAINLEVEL_OUTOF10: 4
PAINLEVEL_OUTOF10: 4
PAINLEVEL_OUTOF10: 6
PAINLEVEL_OUTOF10: 4
PAINLEVEL_OUTOF10: 4
PAINLEVEL_OUTOF10: 0
PAINLEVEL_OUTOF10: 2
PAINLEVEL_OUTOF10: 0
PAINLEVEL_OUTOF10: 0
PAINLEVEL_OUTOF10: 1
PAINLEVEL_OUTOF10: 0
PAINLEVEL_OUTOF10: 8
PAINLEVEL_OUTOF10: 5
PAINLEVEL_OUTOF10: 3
PAINLEVEL_OUTOF10: 0
PAINLEVEL_OUTOF10: 0
PAINLEVEL_OUTOF10: 8
PAINLEVEL_OUTOF10: 7
PAINLEVEL_OUTOF10: 0
PAINLEVEL_OUTOF10: 5
PAINLEVEL_OUTOF10: 2
PAINLEVEL_OUTOF10: 0
PAINLEVEL_OUTOF10: 0
PAINLEVEL_OUTOF10: 1
PAINLEVEL_OUTOF10: 4
PAINLEVEL_OUTOF10: 4
PAINLEVEL_OUTOF10: 7
PAINLEVEL_OUTOF10: 4
PAINLEVEL_OUTOF10: 0
PAINLEVEL_OUTOF10: 5
PAINLEVEL_OUTOF10: 2
PAINLEVEL_OUTOF10: 0
PAINLEVEL_OUTOF10: 7
PAINLEVEL_OUTOF10: 2
PAINLEVEL_OUTOF10: 0
PAINLEVEL_OUTOF10: 8
PAINLEVEL_OUTOF10: 2
PAINLEVEL_OUTOF10: 4
PAINLEVEL_OUTOF10: 0
PAINLEVEL_OUTOF10: 5
PAINLEVEL_OUTOF10: 2
PAINLEVEL_OUTOF10: 6
PAINLEVEL_OUTOF10: 7
PAINLEVEL_OUTOF10: 5
PAINLEVEL_OUTOF10: 0
PAINLEVEL_OUTOF10: 4
PAINLEVEL_OUTOF10: 0
PAINLEVEL_OUTOF10: 8
PAINLEVEL_OUTOF10: 0
PAINLEVEL_OUTOF10: 0
PAINLEVEL_OUTOF10: 8
PAINLEVEL_OUTOF10: 0
PAINLEVEL_OUTOF10: 0
PAINLEVEL_OUTOF10: 3
PAINLEVEL_OUTOF10: 0
PAINLEVEL_OUTOF10: 0

## 2019-01-01 ASSESSMENT — PAIN DESCRIPTION - LOCATION
LOCATION: BACK
LOCATION: GENERALIZED
LOCATION: BACK;KNEE
LOCATION: GENERALIZED
LOCATION: BACK;KNEE

## 2019-01-01 ASSESSMENT — ENCOUNTER SYMPTOMS
SHORTNESS OF BREATH: 1
SHORTNESS OF BREATH: 1
EYES NEGATIVE: 1
GASTROINTESTINAL NEGATIVE: 1
NAUSEA: 0
SHORTNESS OF BREATH: 1
EYES NEGATIVE: 1
EYES NEGATIVE: 1
SHORTNESS OF BREATH: 1
CONSTIPATION: 1
DIARRHEA: 0
CONSTIPATION: 0
COUGH: 0
EYES NEGATIVE: 1
VOMITING: 0
BACK PAIN: 0
GASTROINTESTINAL NEGATIVE: 1
SHORTNESS OF BREATH: 1
SHORTNESS OF BREATH: 1
EYES NEGATIVE: 1
CONSTIPATION: 1
EYES NEGATIVE: 1
SHORTNESS OF BREATH: 1

## 2019-01-01 ASSESSMENT — PAIN DESCRIPTION - ONSET: ONSET: ON-GOING

## 2019-01-01 ASSESSMENT — PAIN DESCRIPTION - PAIN TYPE
TYPE: CHRONIC PAIN
TYPE: ACUTE PAIN

## 2019-01-01 ASSESSMENT — PAIN - FUNCTIONAL ASSESSMENT: PAIN_FUNCTIONAL_ASSESSMENT: PREVENTS OR INTERFERES SOME ACTIVE ACTIVITIES AND ADLS

## 2019-01-01 ASSESSMENT — PAIN DESCRIPTION - PROGRESSION: CLINICAL_PROGRESSION: NOT CHANGED

## 2019-01-01 ASSESSMENT — PAIN DESCRIPTION - ORIENTATION
ORIENTATION: OTHER (COMMENT)
ORIENTATION: RIGHT;LEFT
ORIENTATION: RIGHT;LEFT

## 2019-01-01 ASSESSMENT — PAIN DESCRIPTION - DESCRIPTORS: DESCRIPTORS: ACHING

## 2019-01-01 ASSESSMENT — PAIN DESCRIPTION - DIRECTION: RADIATING_TOWARDS: NO

## 2019-01-01 ASSESSMENT — PAIN DESCRIPTION - FREQUENCY: FREQUENCY: CONTINUOUS

## 2019-05-17 ENCOUNTER — OUTSIDE FACILITY SERVICE (OUTPATIENT)
Dept: PULMONOLOGY | Facility: CLINIC | Age: 64
End: 2019-05-17

## 2019-05-17 PROBLEM — J96.21 ACUTE ON CHRONIC RESPIRATORY FAILURE WITH HYPOXIA AND HYPERCAPNIA (HCC): Status: ACTIVE | Noted: 2019-01-01

## 2019-05-17 PROBLEM — I50.32 CHRONIC DIASTOLIC CONGESTIVE HEART FAILURE (HCC): Status: ACTIVE | Noted: 2019-01-01

## 2019-05-17 PROBLEM — J44.1 COPD EXACERBATION (HCC): Status: ACTIVE | Noted: 2019-01-01

## 2019-05-17 PROBLEM — I48.20 CHRONIC ATRIAL FIBRILLATION (HCC): Status: ACTIVE | Noted: 2019-01-01

## 2019-05-17 PROBLEM — J96.22 ACUTE ON CHRONIC RESPIRATORY FAILURE WITH HYPOXIA AND HYPERCAPNIA (HCC): Status: ACTIVE | Noted: 2019-01-01

## 2019-05-17 PROBLEM — J18.9 PNEUMONIA: Status: ACTIVE | Noted: 2019-01-01

## 2019-05-17 PROBLEM — Z86.711 PERSONAL HISTORY OF PE (PULMONARY EMBOLISM): Status: ACTIVE | Noted: 2019-01-01

## 2019-05-17 PROCEDURE — 99291 CRITICAL CARE FIRST HOUR: CPT | Performed by: INTERNAL MEDICINE

## 2019-05-17 NOTE — CONSULTS
Pulmonary and Critical Care Consult Note [facility]  CRITICAL CARE NOTE    Dianne Chapman   MR# [id number]  Acct# [de-identified]  5/17/2019   6:28 PM   Referring Provider: Alexandria Keller MD  Chief Complaint: Mechanically ventilated    HPI: We are consulted to see this 61y.o. year old female with hx copd and prior hx acute respiratory failure requiring mechanical ventilation in 2017, born on 1955. Pt was transferred to this facility from [facility] in [place], [place] with worsening respiratory failure for 5 days. History is reviewed from patient's family who is here, the referring physician who called prior to the transfer, and review of 341 pages of records sent from the referring hospital.  Pt was last well on Saturday about 6 days ago, became ill overnight 5 days ago with increasing cough and shortness of breath prompting admission. CTA showed LLL infiltrate and bronchiectasis. She was treated for LLL pneumonia initially with levofloxacin and cefepime and solumedrol, changed to vancomycin/zosyn/levofloxacin after 48 hr of nonimprovement. Course also complicated by labile blood pressure, noncardiac chest pain, 2 liter fluid bolus on admission and subsequent diuresis, bipap therapy and concern for adrenal insufficiency treated with hydrocortisone. She worsened despite all of that and was transferred here. She was intubated en route to this facility during transport. According to records she has not been subjected to domestic violence, nor has she had exposure to anyone sick from outside the [facility]. Home medications include several potential respiratory depressants including oxycodone, xanax, norco, baclofen. She has had symbicort prescribed but not taking.     Past Medical History  Past Medical History:   Diagnosis Date    A-fib [facility])     CHF [facility]congestive heart failure) [facility] [address]     COPD (chronic obstructive pulmonary disease) (HCC)     Encephalopathy     Fibromyalgia     Hyperlipidemia Physical Exam   Constitutional: She appears well-developed and well-nourished. She appears lethargic. Non-toxic appearance. She appears ill. No distress. She is sedated and intubated. HENT:   Head: Normocephalic and atraumatic. Eyes: Pupils are equal, round, and reactive to light. EOM are normal. No scleral icterus. Cardiovascular: Normal rate and regular rhythm. Pulmonary/Chest: Effort normal. No accessory muscle usage. Tachypnea noted. She is intubated. No respiratory distress. She has decreased breath sounds. She has no wheezes. She has rhonchi. She exhibits no tenderness. Abdominal: Soft. Bowel sounds are normal.   Musculoskeletal: Normal range of motion. She exhibits no edema. Neurological: She appears lethargic. Skin: Skin is warm and dry. Psychiatric: Her mood appears not anxious. She is not agitated. She is noncommunicative. Vitals reviewed. Recent laboratory:    Recent Labs     05/17/19  1636 05/17/19  1644   NA  --  150*   K 3.1 3.3*   CL  --  109   CO2  --  31*   BUN  --  17   CREATININE  --  0.6   CALCIUM  --  8.4*   GLUCOSE  --  126*      Recent Labs     05/17/19  1636   PHART 7.490*   DOK9LXH 41.0   PO2ART 129.0*   AFI5EVS 31.2*   F9NJOEPR 96.8   BEART 7.2*     Recent Labs     05/17/19  1644   AST 21   ALT 8   ALKPHOS 66   BILITOT 1.0   MG 2.5*   CALCIUM 8.4*     No results for input(s): BC, LABGRAM, CULTRESP, BFCX in the last 72 hours. Radiograph  Xr Chest Portable    Result Date: 5/17/2019  Examination. XR CHEST PORTABLE History: Tube placement. A frontal portable supine view of the chest is obtained. There is no previous study for comparison. There is extensive interstitial infiltrate in the lungs bilaterally more pronounced in the left lower lung. In the absence of a previous study for comparison the age of this infiltrate is not certain. An endotracheal tube is seen with distal end 5 cm above trachea bifurcation. This is in optimal position.  A left-sided MediPort system is seen introduced through the left internal jugular vein. The distal end is in the distal superior vena cava. No pneumothorax. There is no bony abnormality. The distal end of the endotracheal tube is 5 cm above trachea bifurcation. This is an optimal position. Extensive interstitial infiltrate in the lungs bilaterally more severely in the left lower lung. This may represent acute or chronic interstitial pneumonitis. A MediPort system in place. Above findings are recorded on a digital voice clip in PACS. Signed by Dr Beverly Keenan on 2019 4:11 PM    My radiograph interpretation/independent review of imaging: ETT in trachea, diffuse infiltrates L>R  Other test results (not lab or imaging): echo at referring hospital: normal Lvef, diastolic dsfunction  Independent review of ekg: pending  Problem list as generated by [employer]:  Patient Active Problem List   Diagnosis    Left lower lobe pneumonia ([address]    Chronic atrial fibrillation ([address]    Personal history of PE (pulmonary embolism)    COPD exacerbation ([address]    Acute on chronic respiratory failure with hypoxia and hypercapnia (HCC)    Chronic diastolic congestive heart failure ([address]     Pulmonary Assessment:  SEVERE ACUTE RESPIRATORY FAILURE REQUIRING MECHANICAL VENTILATION  This is a threat to life or pulmonary function, high risk, due to severe pneumonia and ards    New problem (to me), with additional workup planned: acute respiratory failure with diffuse lung disease, presumed severe cap with ards  New problem (to me), no additional workup planned: hx atrial fibrillation  Other problems either stable, failing to improve or worsenin. Hx diastolic chf  2. Reported hx copd, although no autopeep or wide peak/plateau pressure gradient currently  3. Hypernatremia  4. Metabolic alkalosis  5. Chronic hypotension  6. Hypokalemia  7.  Steroid induced hyperglycemia    Recommend/plan:   Ventilation order set, including intravenous narcotics and benzodiazepines (ie controlled drugs) for sedation and ventilator tolerance. Ventilator settings adjusted to optimize pt-vent synchrony and minimize driving pressure, plateau pressure, with low Vt strategy  Arterial blood gas in the morning tomorrow  Chest X-ray in the morning tomorrow  · Continue broad spectrum abx  · Titrate fio2 as needed for sat 90  · Monitor blood pressures, not currently needing pressors  · On versed drip; change to propofol if higher dosing needed for sedation  · Add morphine prn  · Restraints to prevent self harm  · dvt prophylaxis - already anticoagulated  · Stress ulcer prophylaxis  · Respiratory cultures, routine, afb, fungus  · ekg  · Trophic nutrition, advance rate over several days slowly    Critical Care Time: 52 minutes. Critical care time was necessary to treat or prevent imminent or life-threatening deterioration of the following conditions: cardiac failure, circulatory failure,  metabolic crisis,respiratory failure. Time was spent by me performing the following activities:development of treatment plan with patient or surrogate, discussion of case with primary service, evaluation of patient's response to treatment, examination of patient, obtaining history from patient or surrogate, ordering and performing treatments and interventions, ordering and review of labaratory studies, ordering and reveiw of radiographic studies, pulse oximetry, re-evaluation of patient's condition, review of old charts, ventilator management. Thank you for this consult. We will follow along.   Electronically signed by Svitlana Nava on 5/17/2019 at 6:53 PM

## 2019-05-17 NOTE — PROGRESS NOTES
Ref Range & Units Status Collected Lab    pH, Arterial 7.490High   7.350 - 7.450 Final 05/17/2019  4:36 PM [facility]   pCO2, Arterial 41.0  35.0 - 45.0 mmHg Final 05/17/2019  4:36 PM [facility]   pO2, Arterial 129. 0High   80.0 - 100.0 mmHg Final 05/17/2019  4:36 PM [facility]   HCO3, Arterial 31. 2High   22.0 - 26.0 mmol/L Final 05/17/2019  4:36 PM [facility] Excess, Arterial 7.2High   -2.0 - 2.0 mmol/L Final 05/17/2019  4:36 PM [facility]   Hemoglobin, Art, Extended 12.3  12.0 - 16.0 g/dL Final 05/17/2019  4:36 PM [facility]   O2 Sat, Arterial 96.8  >92 % Final 05/17/2019  4:36 PM [facility]   Carboxyhgb, Arterial 1.4  0.0 - 5.0 % Final 05/17/2019  4:36 PM [facility]        0.0-1.5   (Smokers 1.5-5.0)    Methemoglobin, Arterial 1.1  <1.5 % Final 05/17/2019  4:36 PM [facility]   O2 Content, Arterial 17.0  Not Established mL/dL Final 05/17/2019  4:36 PM [facility]   O2 Therapy Unknown   Final 05/17/2019  4:36 PM [address] [place] [address] Performed By     Ivana Vizcarra Name Director Address Valid Date Range   924-AM - [address] LAB Tab Monson MD [address]  [address] [facility] [place] [address] 08/30/17 0733-Present   Lab and Collection     BLOOD GAS, ARTERIAL - 5/17/2019   Result History     BLOOD GAS, ARTERIAL on 5/17/2019   Result Information     Flag: Abnormal Status: Final result (Collected: 5/17/2019 16:36) Provider Status: Ordered   Click to Print Result   View [facility]     BLOOD GAS, ARTERIAL (Order #[id number]) on 5/17/19   Order Report   vc 14 600 12+ 100%  RR  Order Details

## 2019-05-17 NOTE — H&P
Harika Jeronimo - History & Physical      PCP: No primary care provider on file. Date of Admission: 5/17/2019    Date of Service: 5/17/2019    Chief Complaint:  Respiratory failure    History Of Present Illness: The patient is a 61 y.o. female who presented to ICU as a transfer from [place], [place] due to ongoing respiratory failure without improvement. The records presented include an admission note, several pages of orders and laboratory results, radiology results. Per the records it is noted that the patient presented with dyspnea and syncope. She was admitted to ICU and treated with BiPAP and high FIO2. Diagnoses included pneumonitis versus pneumonia LLL, Afib anticoagulated, COPD CRF and syncope. She has history of PE, negative CT PE at referring facility and is fully anticoagulated. Per records had an IVCF that was removed about 2 years ago. She was transferred due to failing to wean of BiPAP. During the flight her respiratory status declines and became more hypoxemic. She was intubated on transit. On arrival she is paralyzed and sedated. Blood pressure is normal. Chest has scattered rales with poor air entry. AC mode vent initially failed to recruit airway and she required bagging by RT before second try. Past Medical History:        Diagnosis Date    A-fib [facility])     CHF (congestive heart failure) (HCC)     COPD (chronic obstructive pulmonary disease) (HCC)     Encephalopathy     Fibromyalgia     Hyperlipidemia     Hypertension     Obesity     PE (pulmonary thromboembolism) ([facility] [address]     Urinary retention      Past Surgical History:        Procedure Laterality Date    GASTRIC BYPASS SURGERY  10/2018     Home Medications:  Prior to Admission medications    Not on File   Reviewed from referring facility. Allergies:    Patient has no known allergies. Social History:    Per records lives independently and drives. Unable to obtain more details.      Family History:  History reviewed. No pertinent family history. Review of Systems:   Unable to obtain, patient is intubated. Physical Examination:     /63   Pulse 109   Temp 98.7 °F (37.1 °C) (Temporal)   Resp 16   Ht 5' 1.02\" (1.55 m)   Wt 178 lb 9.2 oz (81 kg)   SpO2 96%   BMI 33.71 kg/m²   General appearance: Sedated and paralyzed, intubated, skin and mucosa are dry. HEENT: Normal cephalic, atraumatic without obvious deformity. Pupils equal, round. Extra ocular absent. Conjunctivae/corneas clear. Normal ears and nose. Hearing unable to assess, per records patient does not wear hearing aids. Lips with no blisters. Missing teeth in all upper jaw, some missing pieces in lower jaw. Remaining teeth normal.  Neck: Supple, with full range of motion. No jugular venous distention. Trachea midline. Thyroid no masses noted. Respiratory: No effort, air entry is very limited, scatter rales and rhonchi. AC mode FIO2 100%. Cardiovascular: Regular rate and rhythm with normal S1/S2 without murmurs, rubs or gallops. Capillary refill normal. Pulses symmetric in upper extremities. Carotids with no bruit. Abdomen: Soft, non-distended with hypoactive bowel sounds. No organomegaly. No hernias. Gyn: breasts symmetric, No masses. Bladder normal. No labia no lesions. Musculoskeletal: No clubbing, cyanosis or edema bilaterally. Full range of motion without deformity in 4 extremities passively  Lymphatic: No enlarged lymph nodes axilla, groin or neck. Skin: Skin color, texture normal.  No rashes or lesions. No nodules. Stage 1 decubiti sacral area. Neurologic:  Neurovascularly sedated and paralyzed, unable to obtain. Psychiatric: Sedated and paralyzed, unable to obtain. Diagnostic Data:  CXR: I have reviewed the report and films with the following interpretation:   The distal end of the endotracheal tube is 5 cm above   trachea bifurcation. This is an optimal position.    Extensive interstitial infiltrate in the lungs bilaterally more   severely in the left lower lung. This may represent acute or chronic   interstitial pneumonitis. A MediPort system in place. Records from referring facility reviewed and reconciled. Active Hospital Problems    Diagnosis Date Noted    Left lower lobe pneumonia ([facility] [address] [J18.1] 05/17/2019    Chronic atrial fibrillation ([facility] [address] [I48.2] 05/17/2019    Personal history of PE (pulmonary embolism) [Z86.711] 05/17/2019    COPD exacerbation (HCC) [J44.1] 05/17/2019    Acute on chronic respiratory failure with hypoxia and hypercapnia (HCC) [M16.49, J96.22] 05/17/2019    Chronic diastolic congestive heart failure (HCC) [I50.32] 05/17/2019       Assessment and Plan:  Principal Problem:    Acute on chronic respiratory failure with hypoxia and hypercapnia (HCC)  Active Problems:    Left lower lobe pneumonia (HCC)    Chronic atrial fibrillation (HCC)    Personal history of PE (pulmonary embolism)    COPD exacerbation (HCC)    Chronic diastolic congestive heart failure (HCC)  Resolved Problems:    * No resolved hospital problems. *  Suspect pulmonary fibrosis or ILD ? Exposure to Amiodarone    1. Admit to ICU floor inpatient. Serial vitals, telemetry, diet npo, activity bedrest.  2. IVF  cc/hour. Bolus prn for hypotension. 3. Follow labs Cortisol/CMP/CBC. Blood and respiratory cultures. 4. Order tests: Consider CT chest. Review Echocardiogram.  5. Consults: Pulmonology  6. Start medications Solumedrol/Duoneb. Continue Cefepime/Levaquin/Vancomycin. 7. Home medications reconciled. 8. DVT prophylaxis with Lovenox full dose. Can hold if planned procedure. 9. Code status full  10. Oxygen 2-3 lpm at home non compliant per records. 11. AC mode titrate FIO2. 12. Disposition ICU floor. 13. Prognosis poor. Patient is critically ill with a high probability of clinically significant/life threatening deteriation in the patient's condition without intervention.  Critical care time evaluating patient, review of records, reassessing, discussion with other physicians/family/nursing, ordering and reviewing lab/radiographs/outside studies is 62 minutes, independent of any separately billable procedures.         [facility]  Hospitalist service  5/17/2019  4:18 PM

## 2019-05-18 ENCOUNTER — OUTSIDE FACILITY SERVICE (OUTPATIENT)
Dept: PULMONOLOGY | Facility: CLINIC | Age: 64
End: 2019-05-18

## 2019-05-18 PROBLEM — J80 ARDS (ADULT RESPIRATORY DISTRESS SYNDROME) (HCC): Status: ACTIVE | Noted: 2019-01-01

## 2019-05-18 PROBLEM — J44.9 COPD (CHRONIC OBSTRUCTIVE PULMONARY DISEASE) (HCC): Status: ACTIVE | Noted: 2019-01-01

## 2019-05-18 PROCEDURE — 99233 SBSQ HOSP IP/OBS HIGH 50: CPT | Performed by: INTERNAL MEDICINE

## 2019-05-18 NOTE — PROGRESS NOTES
Pulmonary and Critical Care Progress Note [facility]    Patient: Chris [place]    1955    MR# [id number]    Acct# [de-identified]   05/18/19   6:25 AM  Referring Provider: Yousif Gorman MD    Chief Complaint: Mechanically ventilated    Interval history: No new adverse events overnight. Pt remains sedated, now on propofol and still on low dose versed drip, remains on broad spectrum antibiotics. ipratropium-albuterol 1 ampule Inhalation Q4H   cefepime 2 g Intravenous Q8H   And      levofloxacin 750 mg Intravenous Q24H   vancomycin 15 mg/kg Intravenous Q12H   methylPREDNISolone 40 mg Intravenous Q8H   sodium chloride flush 10 mL Intravenous 2 times per day   vancomycin (VANCOCIN) intermittent dosing (placeholder)  Other RX Placeholder   enoxaparin 1 mg/kg Subcutaneous BID   polyvinyl alcohol 1 drop Both Eyes Q4H   And      AKWA TEARS  Both Eyes Q4H   famotidine (PEPCID) injection 20 mg Intravenous BID   chlorhexidine 15 mL Mouth/Throat BID      sodium chloride 100 mL/hr at 05/18/19 0356    propofol 25 mcg/kg/min (05/18/19 0346)     Review of Systems:   Cannot obtain due to mechanical ventilation    Physical Exam:  Blood pressure 111/64, pulse 82, temperature 98.9 °F (37.2 °C), temperature source Temporal, resp. rate 24, height 5' 1.02\" (1.55 m), weight 178 lb 9.2 oz (81 kg), SpO2 96 %. Intake/Output Summary (Last 24 hours) at 5/18/2019 0625  Last data filed at 5/18/2019 0300  Gross per 24 hour   Intake 1475.73 ml   Output 1090 ml   Net 385.73 ml        Vent Mode: AC/VC/Vt Ordered: 420 mL/Rate Set: 20 bmp/ /PEEP/CPAP: 12/FiO2 : 60 %/   Peak Inspiratory Pressure: 22.7 cmH2O  Mean Airway Pressure: 16.4 cmH20  I:E Ratio: 1:2.79  Rate Measured: 25.9 br/min  Minute Volume: 16.9 Liters     Physical Exam   Constitutional: She appears well-developed and well-nourished. No distress. She is sedated and intubated. HENT:   Head: Normocephalic and atraumatic.    Eyes: Pupils are equal, round, and reactive to light. EOM are normal. No scleral icterus. Cardiovascular: Normal rate and regular rhythm. Pulmonary/Chest: She is intubated. No respiratory distress. She has decreased breath sounds. She has no wheezes. She has rhonchi (few). She exhibits no tenderness. Abdominal: Soft. Bowel sounds are normal.   Musculoskeletal: Normal range of motion. She exhibits no edema. Skin: Skin is warm and dry. Psychiatric: She has a normal mood and affect. Vitals reviewed. Recent Labs     05/18/19  0455   WBC 6.4   RBC 3.76*   HGB 11.3*   HCT 35.2*      MCV 93.6   MCH 30.1   MCHC 32.1*   RDW 15.3*      Recent Labs     05/17/19  1644 05/18/19  0455 05/18/19  0507   * 152*  --    K 3.3* 3.4* 3.2    111  --    CO2 31* 28  --    BUN 17 21  --    CREATININE 0.6 0.7  --    CALCIUM 8.4* 8.3*  --    GLUCOSE 126* 124*  --       Recent Labs     05/17/19  1636 05/18/19  0507   PHART 7.490* 7.450   ULP6UCG 41.0 45.0   PO2ART 129.0* 69.0*   FOC8PRM 31.2* 31.3*   L0WOEYHT 96.8 94.4   BEART 7.2* 6.5*     Recent Labs     05/17/19  1644 05/18/19  0455   AST 21  --    ALT 8  --    ALKPHOS 66  --    BILITOT 1.0  --    MG 2.5* 2.7*   CALCIUM 8.4* 8.3*     Radiograph: today's film; done late, report pending. My radiograph interpretation: yesterday's film:  ards changes, ett in trachea    Pulmonary Assessment:  1. Acute respiratory failure with ards, CAP, prob underlying copd  2. Hypernatremia  3. Metabolic alkalosis better    Recommend:   · Continue mechanical ventilation as is  · Not ready to reduce peep or other aspects of vent support  · Daily \"sedation vacation\" and spontaneous breathing trials as tolerated once peep can be reduced  · Free water per FT.   · Taper iv steroids    Electronically signed by Elizabeth Brand on 5/18/2019 at 6:25 AM

## 2019-05-18 NOTE — PROGRESS NOTES
Nutrition Assessment (Enteral Nutrition)    Type and Reason for Visit: Initial, Consult    Nutrition Recommendations: start Oxepa slowly and work toward goal rate in 5-7 days. Suggest starting at 10ml and increasing by 5 ml every 12 hours until goal of 37ml is reached. Nutrition Assessment: Received consult for Trophic TF with goal met in 5-7 days orders and management. Pt Nutritional risk AEB vent status, starting EN, Weight loss with Briatric surgery. Would suggest Oxepa as formula of choice due to pulmonar issues and vent. Goal rate is 37ml/hr. Suggest starting at 10ml and increasing by 5 ml every 12 hours until goal is reached. Malnutrition Assessment:  · Malnutrition Status: At risk for malnutrition  · Context: Acute illness or injury  · Findings of the 6 clinical characteristics of malnutrition (Minimum of 2 out of 6 clinical characteristics is required to make the diagnosis of moderate or severe Protein Calorie Malnutrition based on AND/ASPEN Guidelines):  1. Energy Intake- ,      2. Weight Loss-(weight loss due to bariatric surgery),    3. Fat Loss-Mild subcutaneous fat loss,    4. Muscle Loss-Mild muscle mass loss,    5. Fluid Accumulation-No significant fluid accumulation,    6.  Strength-Not measured    Nutrition Risk Level: High    Nutrition Needs:  · Estimated Daily Total Kcal: 3205-5050 kcals (14-18kcals/kg)  · Estimated Daily Protein (g): 54-91g  · Estimated Daily Fluid (ml/day): 3078-7321 ml (18-25ml/kg)    Nutrition Diagnosis:   · Problem: Inadequate oral intake  · Etiology: related to Acute injury/trauma, Impaired respiratory function-inability to consume food     Signs and symptoms:  as evidenced by NPO status due to medical condition, Intubation, Nutrition support - EN    Objective Information:  · Nutrition-Focused Physical Findings: wellnourished appearing female.   Aware marquez had bariatric surgery  · Wound Type: None  · Current Nutrition Therapies:  · Oral Diet Orders: NPO · Oral Diet intake: NPO  · Oral Nutrition Supplement (ONS) Orders: None  · ONS intake: NPO  · Tube Feeding (TF) Orders:   · Feeding Route: Orogastric  · Formula: Acute Lung Injury (ALI/ARDS)  · Rate (ml/hr):goal rate 37ml/hr. Start at 10ml and increase by 5 ml every 12 hours    · Volume (ml/day): goal rate = 888ml.   [id number]  · Duration: Continuous  · Additives/Modulars:   None  · Water Flushes: 30ml/hr  · Current TF & Flush Orders Provides: 10ml = 360 kcals [id number] protein, 25g CHO and 188ml free water.   Flush provides 720ml  · Goal TF & Flush Orders Provides: 37ml = 1332 kcals with 55g protein, 93g CHO and 697 ml free water from formul and 720ml free water from pump flush  · Additional Calories: Propofol 12.2ml = 322 NP kcals     · Anthropometric Measures:  · Ht: 5' 1.02\" (155 cm)   · Current Body Wt: 178 lb 9 oz (81 kg)  · Admission Body Wt: 178 lb 9 oz (81 kg)  · Usual Body Wt: 254 lb (115.2 kg)(7/2016)  · Ideal Body Wt: 105 lb (47.6 kg), % Ideal Body 170.3%  · BMI Classification: BMI 30.0 - 34.9 Obese Class I    Nutrition Interventions:   Start Tube Feeding  Continued Inpatient Monitoring    Nutrition Evaluation:   · Evaluation: Goals set   · Goals: meet nutritional needs through tf advanced slowly   · Monitoring: Nutrition Progression, TF Intake, TF Tolerance, Skin Integrity, Weight, Pertinent Labs      Electronically signed by Arsalan Pandya MS, RD, LD on 5/18/19 at 8:07 AM    Contact Number: [phone]

## 2019-05-18 NOTE — PLAN OF CARE
Nutrition Problem: Inadequate oral intake  Intervention: Food and/or Nutrient Delivery: Start Tube Feeding  Nutritional Goals: meet nutritional needs through tf advanced slowly

## 2019-05-18 NOTE — PROGRESS NOTES
ICU Hospitalist Progress Note                    Patient Name: Nella Harp  [id number]  Admit Date: 5/17/2019  ICU Day 2  LOS 1     PCP: No primary care provider on file. Brief Overview: 61 y.o. female     Chief Complaint: Shortness of breath. Subjective / History of present illness:  Patient remains intubated, properly sedated with Propofol. Cumulative hospital history:   Transfer from [facility] due to not improving respiratory failure suspected LLL PNA with possible ARDS and diastolic HF. She required intubation in transfer and was evaluated by pulmonology consultant. REVIEW OF SYSTEMS:  Unable to obtain    OBJECTIVE:     Ventilator Settings:   Vent Information  $Ventilation: $Initial Day  Ventilator Started: Yes  Vent Mode: AC/VC  Vt Ordered: 420 mL  Rate Set: 20 bmp  FiO2 : 60 %  Sensitivity: 5  PEEP/CPAP: 12  I Time/ I Time %: 0.7 s  Additional Respiratory  Assessments  Pulse: 72  Resp: 22  SpO2: 99 %  Oral Care: Mouth suctioned, Mouth swabbed, Mouth moisturizer    IV Access: Port   Vasopressors: Not required currently   Diet:.  NPO  Antibiotics: Cefepime/Levaquin/Vancomycin    Prophylaxis:  DVT - Lovenox full dose  GI - Protonix    Current Medications:  Current Facility-Administered Medications   Medication Dose Route Frequency Provider Last Rate Last Dose    vancomycin (VANCOCIN) 1000 mg in dextrose 5% 200 mL IVPB  1,000 mg Intravenous Q12H Lionel Christina MD        albuterol (PROVENTIL) nebulizer solution 2.5 mg  2.5 mg Nebulization Q4H PRN Lionel Christina MD        ipratropium-albuterol (DUONEB) nebulizer solution 1 ampule  1 ampule Inhalation Q4H Lionel Christina MD   1 ampule at 05/18/19 2305    ceFEPIme (MAXIPIME) 2 g in sterile water 20 mL IV syringe  2 g Intravenous Q8H Lionel Christina MD   2 g at 05/18/19 0049    And    levofloxacin (LEVAQUIN) 750 MG/150ML infusion 750 mg  750 mg Intravenous Q24H Lionel Christina MD   Stopped at 05/17/19 9403    methylPREDNISolone sodium (SOLU-MEDROL) injection 40 mg  40 mg Intravenous Q8H Saw Harp MD   40 mg at 05/18/19 0049    sodium chloride flush 0.9 % injection 10 mL  10 mL Intravenous 2 times per day Saw Harp MD        sodium chloride flush 0.9 % injection 10 mL  10 mL Intravenous PRN Saw Harp MD        0.9 % sodium chloride infusion   Intravenous Continuous Saw Harp  mL/hr at 05/18/19 0356      acetaminophen (TYLENOL) suppository 650 mg  650 mg Rectal Q4H PRN Saw Harp MD        vancomycin Denisse Triplett) intermittent dosing (placeholder)   Other RX Placeholder Saw Harp MD        enoxaparin (LOVENOX) injection 80 mg  1 mg/kg Subcutaneous BID Saw Harp MD   80 mg at 05/17/19 2210    polyvinyl alcohol (LIQUIFILM TEARS) 1.4 % ophthalmic solution 1 drop  1 drop Both Eyes Q4H Sloane Boyle MD   1 drop at 05/18/19 0330    And    AKWA TEARS (LACRILUBE) 2-15-83 % opthalmic ointment   Both Eyes Q4H Sloane Boyle MD        famotidine (PEPCID) injection 20 mg  20 mg Intravenous BID Sloane Boyle MD   20 mg at 05/17/19 2211    chlorhexidine (PERIDEX) 0.12 % solution 15 mL  15 mL Mouth/Throat BID Sloane Boyle MD   15 mL at 05/17/19 2210    morphine (PF) injection 2 mg  2 mg Intravenous Q1H PRN Sloane Boyle MD        propofol 1000 MG/100ML injection  10 mcg/kg/min Intravenous Titrated Sloane Boyle MD 14.6 mL/hr at 05/18/19 0907 30 mcg/kg/min at 05/18/19 0907       Physical Exam:  /69   Pulse 72   Temp 98.6 °F (37 °C) (Temporal)   Resp 22   Ht 5' 1.02\" (1.55 m)   Wt 177 lb 6.4 oz (80.5 kg)   SpO2 99%   BMI 33.49 kg/m²   24hr I & O:      Intake/Output Summary (Last 24 hours) at 5/18/2019 0923  Last data filed at 5/18/2019 2692  Gross per 24 hour   Intake 1877.59 ml   Output 1340 ml   Net 537.59 ml     Physical Exam   Constitutional: She appears well-developed and well-nourished. No distress. HENT:   Head: Normocephalic and atraumatic. Linda Mcpherson M.D.   Hospitalist service  5/18/2019  9:23 AM

## 2019-05-18 NOTE — PROGRESS NOTES
5/18/2019  5:08 AM - Andree Gould Incoming Lab Results From [employer] Value Ref Range & Units Status Collected Lab   pH, Arterial 7.450  7.350 - 7.450 Final 05/18/2019  5:07 AM [facility] Lab   pCO2, Arterial 45.0  35.0 - 45.0 mmHg Final 05/18/2019  5:07 AM [facility] Lab   pO2, Arterial 69. 0Low   80.0 - 100.0 mmHg Final 05/18/2019  5:07 AM [facility] Lab   HCO3, Arterial 31.3High   22.0 - 26.0 mmol/L Final 05/18/2019  5:07 AM [facility] Excess, Arterial 6. 5High   -2.0 - 2.0 mmol/L Final 05/18/2019  5:07 AM [facility] Lab   Hemoglobin, Art, Extended 11.8Low   12.0 - 16.0 g/dL Final 05/18/2019  5:07 AM [facility] Lab   O2 Sat, Arterial 94.4  >92 % Final 05/18/2019  5:07 AM [facility] Lab   Carboxyhgb, Arterial 1.8  0.0 - 5.0 % Final 05/18/2019  5:07 AM [facility] Lab        0.0-1.5   (Smokers 1.5-5.0)    Methemoglobin, Arterial 0.9  <1.5 % Final 05/18/2019  5:07 AM [facility] Lab   O2 Content, Arterial 15.7  Not Established mL/dL Final 05/18/2019  5:07 AM [facility] Lab   O2 Therapy Unknown   Final 05/18/2019  5:07 AM [facility] [address] Performed By     [address] Name Director Address Valid Date Range   [id number] LAB Vance Hutton  [facility],[address]  [address] [facility] [address] 08/30/17 0733-Present   Lab and Collection     VC 20, Vt 420, FIO2 60%, +12 PEEP, left brachial

## 2019-05-18 NOTE — PROGRESS NOTES
Wasted 80 mL of Versed with Isaac Barahona.     Electronically signed by Wilfredo Guevara RN on 5/18/2019 at 12:09 PM

## 2019-05-19 ENCOUNTER — OUTSIDE FACILITY SERVICE (OUTPATIENT)
Dept: PULMONOLOGY | Facility: CLINIC | Age: 64
End: 2019-05-19

## 2019-05-19 PROBLEM — R78.81 BACTEREMIA: Status: ACTIVE | Noted: 2019-01-01

## 2019-05-19 PROCEDURE — 99233 SBSQ HOSP IP/OBS HIGH 50: CPT | Performed by: INTERNAL MEDICINE

## 2019-05-19 NOTE — PROGRESS NOTES
5/19/2019  4:59 AM - Andree Gould Incoming Lab Results From [employer] Value Ref Range & Units Status Collected Lab   pH, Arterial 7.440  7.350 - 7.450 Final 05/19/2019  4:58 AM [facility] Lab   pCO2, Arterial 42.0  35.0 - 45.0 mmHg Final 05/19/2019  4:58 AM [facility] Lab   pO2, Arterial 57. 0Low   80.0 - 100.0 mmHg Final 05/19/2019  4:58 AM [facility] Lab   HCO3, Arterial 28. 5High   22.0 - 26.0 mmol/L Final 05/19/2019  4:58 AM [facility] Excess, Arterial 3. 9High   -2.0 - 2.0 mmol/L Final 05/19/2019  4:58 AM [facility] Lab   Hemoglobin, Art, Extended 11.5Low   12.0 - 16.0 g/dL Final 05/19/2019  4:58 AM [facility] Lab   O2 Sat, Arterial 91.1  >92 % Final 05/19/2019  4:58 AM [facility] Lab   Carboxyhgb, Arterial 1.8  0.0 - 5.0 % Final 05/19/2019  4:58 AM  - [address] Yash [address]   0.0-1.5   (Smokers 1.5-5.0)    Methemoglobin, Arterial 1.2  <1.5 % Final 05/19/2019  4:58 AM [facility] Lab   O2 Content, Arterial 14.7  Not Established mL/dL Final 05/19/2019  4:58 AM [facility] Lab   O2 Therapy Unknown   Final 05/19/2019  4:58 AM  - [address] Performed By     [address] Name Director Address Valid Date Range   [id number] LAB Sara Barahona  [place],[address]  [address] [facility] [address] 08/30/17 0733-Present   Lab and Collection     Blood gas, arterial - 5/18/2019   Result History     Blood gas, arterial on 5/19/2019   Result Information     Flag: Abnormal Status: Final result (Collected: 5/19/2019 04:58) Provider Status: Ordered   Click to Print Result   View SmartLink Info     Blood gas, arterial (Order #[id number]) on 5/18/19   Order Report     Order Details   VC,20,420, PEEP 12, 45%  RIGHT BRACHIAL

## 2019-05-19 NOTE — PROGRESS NOTES
Pulmonary and Critical Care Progress Note [address] [facility]    Patient: Skyler Hong    1955    MR# [id number]    Acct# [de-identified]   05/19/19   8:40 AM  Referring Provider: Matthew De Leon MD    Chief Complaint: Mechanically ventilated    Interval history: She remains sedated on mechanical ventilation. Oxygenation is been borderline overnight still requiring high PEEP and high FiO2. Currently sedated and unable to provide any history. Nursing reports no additional events. Remains on antibiotics and IV steroids    vancomycin 1,000 mg Intravenous Q12H   methylPREDNISolone 40 mg Intravenous Q12H   ipratropium-albuterol 1 ampule Inhalation Q4H   cefepime 2 g Intravenous Q8H   And      levofloxacin 750 mg Intravenous Q24H   sodium chloride flush 10 mL Intravenous 2 times per day   vancomycin (VANCOCIN) intermittent dosing (placeholder)  Other RX Placeholder   enoxaparin 1 mg/kg Subcutaneous BID   polyvinyl alcohol 1 drop Both Eyes Q4H   And      AKWA TEARS  Both Eyes Q4H   famotidine (PEPCID) injection 20 mg Intravenous BID   chlorhexidine 15 mL Mouth/Throat BID      dextrose 5% and 0.45% NaCl with KCl 20 mEq 100 mL/hr at 05/19/19 0230    propofol 30 mcg/kg/min (05/19/19 0749)     Review of Systems:   Cannot obtain due to mechanical ventilation    Physical Exam:  Blood pressure (!) 99/57, pulse 64, temperature 98.6 °F (37 °C), temperature source Temporal, resp. rate 18, height 5' 1.02\" (1.55 m), weight 180 lb 12.8 oz (82 kg), SpO2 100 %.     Intake/Output Summary (Last 24 hours) at 5/19/2019 0840  Last data filed at 5/19/2019 0749  Gross per 24 hour   Intake 3495.06 ml   Output 885 ml   Net 2610.06 ml        Vent Mode: AC/VC/Vt Ordered: 420 mL/Rate Set: 20 bmp/ /PEEP/CPAP: 12/FiO2 : 45 %/   Peak Inspiratory Pressure: 28.8 cmH2O  Mean Airway Pressure: 16.7 cmH20  I:E Ratio: 1:3.07  Rate Measured: 23.8 br/min  Minute Volume: 15.7 Liters   plateau airway pressure 18    Physical Exam   Constitutional: She

## 2019-05-20 ENCOUNTER — OUTSIDE FACILITY SERVICE (OUTPATIENT)
Dept: PULMONOLOGY | Facility: CLINIC | Age: 64
End: 2019-05-20

## 2019-05-20 PROCEDURE — 99233 SBSQ HOSP IP/OBS HIGH 50: CPT | Performed by: INTERNAL MEDICINE

## 2019-05-20 NOTE — PROGRESS NOTES
Pulmonary and Critical Care Progress Note [address] [facility]    Patient: Jaimie Uriostegui    1955    MR# [id number]    Acct# [de-identified]   05/20/19   7:14 AM  Referring Provider: Kamlesh Coburn MD    Chief Complaint: Mechanically ventilated    Interval history: She remains sedated on mechanical ventilation. Oxygenation improved overnight. Back on versed gtt due to not resting. No family at the bedside. CXR and ABGs stable. RN at the bedside indicates that she is up 5L since admission. No family at the bedside. furosemide 20 mg Intravenous BID   vancomycin 1,000 mg Intravenous Q18H   gabapentin 300 mg Per NG tube TID   rOPINIRole 1 mg Per NG tube TID   FLUoxetine 20 mg Per NG tube Daily   methylPREDNISolone 40 mg Intravenous Q12H   ipratropium-albuterol 1 ampule Inhalation Q4H   cefepime 2 g Intravenous Q8H   And      levofloxacin 750 mg Intravenous Q24H   sodium chloride flush 10 mL Intravenous 2 times per day   vancomycin (VANCOCIN) intermittent dosing (placeholder)  Other RX Placeholder   enoxaparin 1 mg/kg Subcutaneous BID   polyvinyl alcohol 1 drop Both Eyes Q4H   And      AKWA TEARS  Both Eyes Q4H   famotidine (PEPCID) injection 20 mg Intravenous BID   chlorhexidine 15 mL Mouth/Throat BID      midazolam 2 mg/hr (05/19/19 2232)    dextrose 5% and 0.45% NaCl with KCl 20 mEq 100 mL/hr at 05/20/19 0100    propofol 20 mcg/kg/min (05/20/19 0507)     Review of Systems:     Cannot obtain due to mechanical ventilation    Physical Exam:  Blood pressure (!) 94/58, pulse 65, temperature 97.9 °F (36.6 °C), temperature source Temporal, resp. rate 17, height 5' 1.02\" (1.55 m), weight 183 lb 8 oz (83.2 kg), SpO2 100 %.     Intake/Output Summary (Last 24 hours) at 5/20/2019 0714  Last data filed at 5/20/2019 0508  Gross per 24 hour   Intake 3866.42 ml   Output 1665 ml   Net 2201.42 ml        Vent Mode: AC/VC/Vt Ordered: 420 mL/Rate Set: 20 bmp/ /PEEP/CPAP: 12/FiO2 : 45 %/   Peak Inspiratory Pressure: 39.1 cmH2O  Mean Airway Pressure: 15.7 cmH20  I:E Ratio: 1:4.38  Rate Measured: 23.5 br/min  Minute Volume: 10.7 Liters   plateau airway pressure 18    Physical Exam   Constitutional: She appears well-developed and well-nourished. No distress. She is sedated and intubated. HENT:   Head: Normocephalic and atraumatic. Eyes: Pupils are equal, round, and reactive to light. EOM are normal. No scleral icterus. Cardiovascular: Normal rate and regular rhythm. Pulmonary/Chest: No tachypnea. She is intubated. No respiratory distress. She has no wheezes. She has rhonchi (few) in the right lower field and the left lower field. She exhibits no tenderness. Abdominal: Soft. Bowel sounds are normal.   Musculoskeletal: Normal range of motion. She exhibits no edema. Neurological:   Intubated and sedated   Skin: Skin is warm and dry. Psychiatric: She has a normal mood and affect. Vitals reviewed. Recent Labs     05/18/19  0455 05/19/19  0535 05/20/19  0535   WBC 6.4 9.1 8.3   RBC 3.76* 3.74* 3.74*   HGB 11.3* 11.1* 11.2*   HCT 35.2* 35.3* 35.5*    149 158   MCV 93.6 94.4 94.9   MCH 30.1 29.7 29.9   MCHC 32.1* 31.4* 31.5*   RDW 15.3* 15.5* 15.5*      Recent Labs     05/18/19  0455  05/19/19  0535 05/20/19  0447 05/20/19  0535   *  --  144  --  140   K 3.4*   < > 3.8 4.6 4.8     --  108  --  108   CO2 28  --  28  --  25   BUN 21  --  26*  --  23   CREATININE 0.7  --  0.8  --  0.6   CALCIUM 8.3*  --  8.3*  --  8.0*   GLUCOSE 124*  --  146*  --  132*    < > = values in this interval not displayed.       Recent Labs     05/18/19  0507 05/19/19  0458 05/20/19 0447   PHART 7.450 7.440 7.390   WWA2EEW 45.0 42.0 44.0   PO2ART 69.0* 57.0* 84.0   ADW1XUC 31.3* 28.5* 26.6*   G8QOVPKQ 94.4 91.1 94.9   BEART 6.5* 3.9* 1.3     Recent Labs     05/17/19  1644 05/18/19  0455  05/20/19  0535   AST 21  --   --   --    ALT 8  --   --   --    ALKPHOS 66  --   --   --    BILITOT 1.0  --   --   --    MG 2.5* 2.7*  --   -- CALCIUM 8.4* 8.3*   < > 8.0*    < > = values in this interval not displayed. Gram stain Aerobic and Anaerobic bottles:   Gram positive cocci in chains and/or pairs   resembling Streptococcus   Culture in progress   Please notify Physician   Abnormal       Organism Streptococcus species  Abnormal        Radiograph:   EXAMINATION: XR CHEST PORTABLE 5/20/2019 6:59 AM   HISTORY: XR CHEST PORTABLE 5/20/2019 5:00 AM   HISTORY: Respiratory failure   COMPARISON: May 19, 2019. FINDINGS:    Bilateral interstitial and airspace filling infiltrates are present. There is no change or improvement compared to May 19, 2019. . The   cardiac silhouette is upper limits of normal. Endotracheal tube and   nasogastric tube are satisfactorily positioned. .    The osseous structures and surrounding soft tissues demonstrate no   acute abnormality.       Impression   1. Persistent bilateral interstitial and airspace filling infiltrates. This is unchanged from May 19. Signed by Dr Ana Maria Rogers on 5/20/2019 7:01 AM       My radiograph interpretation: ETT in good position, bilateral infiltrates persist    Pulmonary Assessment:    1. Acute respiratory failure with ards, CAP streptococcal, prob underlying copd  2. Hypernatremia improved  3. Metabolic alkalosis better    Recommend:     · Continue mechanical ventilation. · Reduce peep to 10 cm  · Gentle diuresis  · Daily \"sedation vacation\". · Not ready for breathing trials   · Taper iv steroids    · Tube feed nutrition at goal  · Abx coverage  · Bronchodilators  · CXR/ABG daily while on the vent    Electronically signed by Sintia Price on 5/20/2019 at 7:14 AM     Physician Substantive portion:  Pt looks better. Sedated on vent. cxr still shows bilat infiltrates. Chest has few crackles, good excursion, and incomplete end expiratory emptying c/w autopeep on review of vent flow curve.   Decrease peep to 10 but we may need to keep it there in future to counterbalance autopeep    I

## 2019-05-20 NOTE — PROGRESS NOTES
ICU Hospitalist Progress Note                    Patient Name: Lupis Nogueira  [id number]  Admit Date: 5/17/2019  ICU Day 2  LOS 3     PCP: No primary care provider on file. Brief Overview: 61 y.o. female     Chief Complaint: Shortness of breath. Subjective / History of present illness:  Patient still requiring ventilatory support with 40% FIO2 and PEEP 10. Resting and well sedated today. Cumulative hospital history:   Transfer from [facility] due to not improving respiratory failure suspected LLL PNA with possible ARDS and diastolic HF. She required intubation in transfer and was evaluated by pulmonology consultant. REVIEW OF SYSTEMS:  Unable to obtain    OBJECTIVE:     Ventilator Settings:   Vent Information  $Ventilation: $Subsequent Day  Ventilator Started: Yes  Vent Mode: AC/VC  Vt Ordered: 420 mL  Rate Set: 20 bmp  FiO2 : 40 %  Sensitivity: 5  PEEP/CPAP: 10  I Time/ I Time %: 0.5 s  Additional Respiratory  Assessments  Pulse: 71  Resp: 18  SpO2: 96 %  Oral Care: Mouthwash, Mouth swabbed, Mouth suctioned, Mouth moisturizer, Lip moisturizer applied    IV Access: Port   Vasopressors: Not required currently   Diet:.  NPO  Antibiotics: Cefepime/Levaquin/Vancomycin    Prophylaxis:  DVT - Lovenox full dose  GI - Protonix    Current Medications:  Current Facility-Administered Medications   Medication Dose Route Frequency Provider Last Rate Last Dose    furosemide (LASIX) injection 20 mg  20 mg Intravenous BID DAXA Day   20 mg at 05/20/19 1701    vancomycin (VANCOCIN) 1000 mg in dextrose 5% 200 mL IVPB  1,000 mg Intravenous Q18H Dennis Campo MD   Stopped at 05/20/19 58    midazolam (VERSED) injection 1 mg  1 mg Intravenous Q2H PRN Dennis Campo MD   1 mg at 05/19/19 2015    gabapentin (NEURONTIN) capsule 300 mg  300 mg Per NG tube TID Dennis Campo MD   300 mg at 05/20/19 1419    rOPINIRole (REQUIP) tablet 1 mg  1 mg Per NG tube TID Dennis Campo MD   1 mg at 05/20/19 1419    FLUoxetine (PROZAC) 20 MG/5ML solution 20 mg  20 mg Per NG tube Daily Camilla Felder MD   20 mg at 05/20/19 0838    midazolam (VERSED) 100 mg in dextrose 5 % 100 mL infusion  1 mg/hr Intravenous Continuous Camilla Felder MD 6 mL/hr at 05/20/19 1658 6 mg/hr at 05/20/19 1658    dextrose 5 % and 0.45 % NaCl with KCl 20 mEq infusion   Intravenous Continuous Camilla Felder  mL/hr at 05/20/19 1106      methylPREDNISolone sodium (SOLU-MEDROL) injection 40 mg  40 mg Intravenous Q12H Elizabeth Brand MD   40 mg at 05/20/19 0838    albuterol (PROVENTIL) nebulizer solution 2.5 mg  2.5 mg Nebulization Q4H PRN Camilla Felder MD        ipratropium-albuterol (DUONEB) nebulizer solution 1 ampule  1 ampule Inhalation Q4H Camilla Felder MD   1 ampule at 05/20/19 1502    ceFEPIme (MAXIPIME) 2 g in sterile water 20 mL IV syringe  2 g Intravenous Q8H Camilla Felder MD   2 g at 05/20/19 1659    And    levofloxacin (LEVAQUIN) 750 MG/150ML infusion 750 mg  750 mg Intravenous Q24H Camilla Felder  mL/hr at 05/20/19 1701 750 mg at 05/20/19 1701    sodium chloride flush 0.9 % injection 10 mL  10 mL Intravenous 2 times per day Camilla Felder MD   10 mL at 05/20/19 0839    sodium chloride flush 0.9 % injection 10 mL  10 mL Intravenous PRN Camilla Felder MD        acetaminophen (TYLENOL) suppository 650 mg  650 mg Rectal Q4H PRN Camilla Felder MD        vancomycin [place]) intermittent dosing (placeholder)   Other RX Placeholder Camilla Felder MD        enoxaparin (LOVENOX) injection 80 mg  1 mg/kg Subcutaneous BID Camilla Felder MD   80 mg at 05/20/19 5107    polyvinyl alcohol (LIQUIFILM TEARS) 1.4 % ophthalmic solution 1 drop  1 drop Both Eyes Q4H Elizabeth Brand MD   1 drop at 05/20/19 1515    And    AKWA TEARS (LACRILUBE) 2-15-83 % opthalmic ointment   Both Eyes Q4H Elizabeth Brand MD        famotidine (PEPCID) injection 20 mg  20 mg Intravenous BID Elizabeth Brand, MD   20 mg at 05/20/19 0838    chlorhexidine (PERIDEX) 0.12 % solution 15 mL  15 mL Mouth/Throat BID Devorah Ely MD   15 mL at 05/20/19 0839    morphine (PF) injection 2 mg  2 mg Intravenous Q1H PRN Devorah Ely MD   2 mg at 05/20/19 1524    propofol 1000 MG/100ML injection  10 mcg/kg/min Intravenous Titrated Devorah Ely MD 14.6 mL/hr at 05/20/19 1658 30 mcg/kg/min at 05/20/19 1658       Physical Exam:  BP (!) 95/55   Pulse 71   Temp 98 °F (36.7 °C) (Temporal)   Resp 18   Ht 5' 1.02\" (1.55 m)   Wt 183 lb 8 oz (83.2 kg)   SpO2 96%   BMI 34.64 kg/m²   24hr I & O:      Intake/Output Summary (Last 24 hours) at 5/20/2019 1858  Last data filed at 5/20/2019 1800  Gross per 24 hour   Intake 4163.56 ml   Output 2990 ml   Net 1173.56 ml     Physical Exam   Constitutional: She appears well-developed and well-nourished. No distress. HENT:   Head: Normocephalic and atraumatic. Right Ear: External ear normal.   Left Ear: External ear normal.   Nose: Nose normal.   Eyes: Pupils are equal, round, and reactive to light. EOM are normal. Right eye exhibits no discharge. Left eye exhibits no discharge. Neck: Normal range of motion. Neck supple. No JVD present. No thyromegaly present. Cardiovascular: Normal rate and normal heart sounds. No murmur heard. Pulmonary/Chest: No stridor. No respiratory distress. She has no wheezes. Intubated   Abdominal: Soft. Bowel sounds are normal. She exhibits no distension and no mass. There is no tenderness. No hernia. Musculoskeletal: Normal range of motion. She exhibits no edema or deformity. Neurological: She displays normal reflexes. No cranial nerve deficit or sensory deficit. Opens eyes, non focal   Skin: Skin is warm. Capillary refill takes less than 2 seconds. She is not diaphoretic. No erythema. No pallor.      Labs:   Recent Labs     05/18/19  0455 05/19/19  0535 05/20/19  0535   WBC 6.4 9.1 8.3   RBC 3.76* 3.74* 3.74*   HGB 11.3* 11.1* 11.2*   HCT 35.2* 35.3* 35.5*    149 158     Recent Labs     05/18/19  0455  05/19/19  0535 05/20/19  0447 05/20/19  0535   *  --  144  --  140   K 3.4*   < > 3.8 4.6 4.8   ANIONGAP 13  --  8  --  7     --  108  --  108   CO2 28  --  28  --  25   BUN 21  --  26*  --  23   CREATININE 0.7  --  0.8  --  0.6   GLUCOSE 124*  --  146*  --  132*   CALCIUM 8.3*  --  8.3*  --  8.0*    < > = values in this interval not displayed. Recent Labs     05/18/19  0455   MG 2.7*     No results for input(s): AST, ALT, ALB, BILITOT, ALKPHOS in the last 72 hours. Cortisol 30.7 ug/dL    HgBA1c:  No components found for: HGBA1C  Troponin T: No results for input(s): TROPONINI in the last 72 hours. Pro-BNP: No results for input(s): BNP in the last 72 hours. INR: No results for input(s): INR in the last 72 hours. ABGs:   Lab Results   Component Value Date    PHART 7.390 05/20/2019    PO2ART 84.0 05/20/2019    QGQ5AHO 44.0 05/20/2019     UA:No results for input(s): NITRITE, COLORU, PHUR, LABCAST, WBCUA, RBCUA, MUCUS, TRICHOMONAS, YEAST, BACTERIA, CLARITYU, SPECGRAV, LEUKOCYTESUR, UROBILINOGEN, BILIRUBINUR, BLOODU, GLUCOSEU, AMORPHOUS in the last 72 hours. Invalid input(s): Annette Getting    RAD: .  CXR 5/17/19  The distal end of the endotracheal tube is 5 cm above  trachea bifurcation. This is an optimal position. Extensive interstitial infiltrate in the lungs bilaterally more  severely in the left lower lung. This may represent acute or chronic  interstitial pneumonitis. A MediPort system in place. Above findings are recorded on a digital voice clip in PACS. EKG/Telemetry: Sinus rhythm    Echo: Noted    Micro:   Last culture of record:     Blood Culture:   Recent Labs     05/18/19  1810 05/18/19  2155   BC No Growth to date. Any change in status will be called. --    BLOODCULT2  --  No Growth to date. Any change in status will be called.        GRAM STAIN  Recent Labs     05/19/19  0245   LABGRAM No Epithelial Cells seen  Many antibiotics and adjust for cultures  ARDS possible > low tidal volume ventilation. May require prolonged ventilation. COPD history but appears not exacerbated. Wean steroids slowly. Positive blood cultures with a contaminant from peripheral and streptococcus from port. Repeated yesterday negative, consider ID consult. Afib remains rate controlled. Lovenox full dose for Afib/DVT/PE history. Prognosis guarded  Pulmonology input noted  Cortisol is over 20 ug/dl Adrenal insufficiency is ruled out    Pt discussed with ICU team during rounds. CC time excluding procedures: 51 minutes    Patient is critically ill with a high probability of clinically significant/life threatening deteriation in the patient's condition without intervention. Critical care time evaluating patient, review of records, reassessing, discussion with other physicians/family/nursing, ordering and reviewing lab/radiographs/outside studies is 51 minutes, independent of any separately billable procedures. Code Status: Full Code       Luiz Lucero M.D.   Hospitalist service  5/20/2019  6:58 PM

## 2019-05-20 NOTE — CONSULTS
Palliative Care:  Pt continues on vent and sedated. No family present at time of PC visit, no family in ICU waiting area. Call made to pt dtr, Perez Kam. Past Medical History:        Past Medical History:   Diagnosis Date    A-fib [facility])     [facility]estive [facility]) [facility]     COPD (chronic obstructive pulmonary disease) (H[facility])     Encephalopathy     Fibromyalgia     Hyperlipidemia     Hypertension     Obesity     Palliative care patient 05/20/2019    PE (pulmonary thromboembolism) ([facility] [facility]     Urinary retention        Advance Directives:   Pt does not have a living will completed. Dtr tells me that pt has always said,\"You all make the decisions\". We did discuss should pt want to follow through with AD here at the hospital PC will assist.     Pain/Other Symptoms:   Pt is resting, sedated with versed. Activity:      As franky       Psychological/Spiritual:   Dtr tells me pt does not attend [employer] and has no spiritual support. Explained PC does have  and would be happy to visit with pt. Patient/Family Discussion:  Dtr reviews the past few days leading up to pt admission. She states around Mother's Day  Weekend pt was doing fine. On Sunday evening she was visiting a friend and returned home stating she did not feel well. On Monday morning pt called her dtr stating she was  Having CP, SOA and dtr tells me pt was confused. She instructed her to call 911. Pt transferred here from other hospital.  Dtr tells me otherwise pt has lived independently, takes care of her \"little dog\", uses a walker to amb. Plan:  Continue vent, tube feed, abx tx, bronchodilators, IV steroids      Patients goal, what they hope for:  Dtr would like to discuss LW with her mother and hopefully have this complete before leaving the hospital. Waiting for pt to get well enough to come off vent.             Electronically signed by Macario Cedeno RN on 5/20/2019 at 1:27 PM

## 2019-05-20 NOTE — PROGRESS NOTES
Wasted 10mL of Versed with Caitlyn OLIVAREZ.   Electronically signed by Pricila Borjas RN on 5/20/2019 at 6:24 PM

## 2019-05-21 ENCOUNTER — OUTSIDE FACILITY SERVICE (OUTPATIENT)
Dept: PULMONOLOGY | Facility: CLINIC | Age: 64
End: 2019-05-21

## 2019-05-21 PROCEDURE — 99233 SBSQ HOSP IP/OBS HIGH 50: CPT | Performed by: INTERNAL MEDICINE

## 2019-05-21 NOTE — PROGRESS NOTES
mL/hr at 05/20/19 2330      albuterol (PROVENTIL) nebulizer solution 2.5 mg  2.5 mg Nebulization Q4H PRN Lynnette Purcell MD        ipratropium-albuterol (DUONEB) nebulizer solution 1 ampule  1 ampule Inhalation Q4H Lynnette Purcell MD   1 ampule at 05/21/19 0716    ceFEPIme (MAXIPIME) 2 g in sterile water 20 mL IV syringe  2 g Intravenous Q8H Lynnette Purcell MD   2 g at 05/21/19 0127    And    levofloxacin (LEVAQUIN) 750 MG/150ML infusion 750 mg  750 mg Intravenous Q24H Lynnette Purcell MD   Stopped at 05/20/19 1831    sodium chloride flush 0.9 % injection 10 mL  10 mL Intravenous 2 times per day Lynnette Purcell MD   10 mL at 05/20/19 0839    sodium chloride flush 0.9 % injection 10 mL  10 mL Intravenous PRN Lynnette Purcell MD        acetaminophen (TYLENOL) suppository 650 mg  650 mg Rectal Q4H PRN Lynnette Purcell MD        vancomycin Sherly Fee) intermittent dosing (placeholder)   Other RX Placeholder Lynnette Purcell MD        enoxaparin (LOVENOX) injection 80 mg  1 mg/kg Subcutaneous BID Lynnette Purcell MD   80 mg at 05/20/19 2236    polyvinyl alcohol (LIQUIFILM TEARS) 1.4 % ophthalmic solution 1 drop  1 drop Both Eyes Q4H Rahul Lan MD   1 drop at 05/20/19 1902    And    AKWA TEARS (LACRILUBE) 2-15-83 % opthalmic ointment   Both Eyes Q4H Rahul Lan MD        famotidine (PEPCID) injection 20 mg  20 mg Intravenous BID Rahul Lan MD   20 mg at 05/20/19 2237    chlorhexidine (PERIDEX) 0.12 % solution 15 mL  15 mL Mouth/Throat BID Rahul Lan MD   15 mL at 05/20/19 2235    morphine (PF) injection 2 mg  2 mg Intravenous Q1H PRN Rahul Lan MD   2 mg at 05/21/19 0407    propofol 1000 MG/100ML injection  10 mcg/kg/min Intravenous Titrated Rahul Lan MD 14.6 mL/hr at 05/21/19 0406 30 mcg/kg/min at 05/21/19 0406        Labs:     Recent Labs     05/19/19  0535 05/20/19  0535 05/21/19  0400   WBC 9.1 8.3 8.7   RBC 3.74* 3.74* 3.83*   HGB 11.1* 11.2* 11.2*   HCT 35.3* 35.5* 36.0*   MCV 94.4 94.9 94.0   MCH 29.7 29.9 29.2   MCHC 31.4* 31.5* 31.1*    158 166     Recent Labs     05/19/19  0535  05/20/19  0535 05/21/19  0400 05/21/19  0439     --  140 139  --    K 3.8   < > 4.8 5.0 4.7   ANIONGAP 8  --  7 8  --      --  108 103  --    CO2 28  --  25 28  --    BUN 26*  --  23 24*  --    CREATININE 0.8  --  0.6 0.7  --    GLUCOSE 146*  --  132* 134*  --    CALCIUM 8.3*  --  8.0* 8.3*  --     < > = values in this interval not displayed. No results for input(s): MG, PHOS in the last 72 hours. No results for input(s): AST, ALT, ALB, BILITOT, ALKPHOS, ALB in the last 72 hours. ABGs:  Recent Labs     05/19/19  0458 05/20/19  0447 05/21/19  0439   PHART 7.440 7.390 7.410   OSA6QNJ 42.0 44.0 47.0*   PO2ART 57.0* 84.0 64.0*   DEX3PVT 28.5* 26.6* 29.8*   BEART 3.9* 1.3 4.4*   HGBAE 11.5* 11.6* 12.1   T0YNSNEU 91.1 94.9 92.4   CARBOXHGBART 1.8 1.6 1.7   02THERAPY Unknown Unknown Unknown     HgBA1c: No results for input(s): LABA1C in the last 72 hours. FLP:  No results found for: TRIG, HDL, LDLCALC, LDLDIRECT, LABVLDL  TSH:  No results found for: TSH  Troponin T: No results for input(s): TROPONINI in the last 72 hours. INR: No results for input(s): INR in the last 72 hours.     Objective:   Vitals: BP (!) 93/50   Pulse 75   Temp 97.8 °F (36.6 °C) (Temporal)   Resp 20   Ht 5' 1.02\" (1.55 m)   Wt 184 lb 14.4 oz (83.9 kg)   SpO2 100%   BMI 34.91 kg/m²   24HR INTAKE/OUTPUT:      Intake/Output Summary (Last 24 hours) at 5/21/2019 0820  Last data filed at 5/21/2019 0716  Gross per 24 hour   Intake 3457.41 ml   Output 4875 ml   Net -1417.59 ml     General appearance: alert and cooperative with exam  HEENT: atraumatic, eyes with clear conjunctiva and normal lids  Lungs: no increased work of breathing, diminished BL, mechanical breath sounds  Heart: regular rate and rhythm and S1, S2 normal  Abdomen: soft, non-tender; bowel sounds normal; no masses,  no

## 2019-05-21 NOTE — PLAN OF CARE
Problem: Risk for Impaired Skin Integrity  Goal: Tissue integrity - skin and mucous membranes  Description  Structural intactness and normal physiological function of skin and  mucous membranes.   Outcome: Ongoing     Problem: Falls - Risk of:  Goal: Will remain free from falls  Description  Will remain free from falls  Outcome: Ongoing  Goal: Absence of physical injury  Description  Absence of physical injury  Outcome: Ongoing     Problem: Nutrition  Goal: Optimal nutrition therapy  Description  Nutrition Problem: Inadequate oral intake  Intervention: Food and/or Nutrient Delivery: Start Tube Feeding  Nutritional Goals: meet nutritional needs through tf advanced slowly     Outcome: Ongoing

## 2019-05-21 NOTE — PROGRESS NOTES
Talked with Dr Alysha Lyn regarding patient anxiety. Versed gtt being weaned off per previous order. Patient is not tolerating this, resp rate is 28-34/min, sats are 90%, double stacked breaths noted. Order received to restart Versed gtt.

## 2019-05-21 NOTE — PROGRESS NOTES
Pharmacy Vancomycin Consult     Vancomycin Day: 5  Current Dosin mg IV q18h    Temp max:  97.3    Recent Labs     19  0535 19  0400   BUN 23 24*       Recent Labs     19  0535 19  0400   CREATININE 0.6 0.7       Recent Labs     19  0535 19  0400   WBC 8.3 8.7         Intake/Output Summary (Last 24 hours) at 2019 1653  Last data filed at 2019 1403  Gross per 24 hour   Intake 4117.89 ml   Output 6000 ml   Net -1882.11 ml       Culture Date Source Results   19 Blood #1 Staph epi   19 Blood #2 Enterococcus sp.   19 Blood No growth   19 Respiratory Normal respiratory jimena       Ht Readings from Last 1 Encounters:   19 5' 1.02\" (1.55 m)        Wt Readings from Last 1 Encounters:   19 184 lb 14.4 oz (83.9 kg)         Body mass index is 34.91 kg/m². Estimated Creatinine Clearance: 81 mL/min (based on SCr of 0.7 mg/dL).     Trough: 17.2    Assessment/Plan: Continue Vancomycin 1000 mg IV q18h    Electronically signed by Odessa Muñoz RPh, BCPS, 2019,4:59 PM

## 2019-05-21 NOTE — CONSULTS
And    AKWA TEARS   Both Eyes Q4H    famotidine (PEPCID) injection  20 mg Intravenous BID    chlorhexidine  15 mL Mouth/Throat BID     Current PRN Medications:  midazolam, albuterol, sodium chloride flush, acetaminophen, morphine    Allergies:  No Known Allergies    Past Medical History: Atrial fibrillation. Congestive heart failure. Chronic obstructive pulmonary disease. Fibromyalgia. Encephalopathy. Hyperlipidemia. Hypertension. Obesity. Pulmonary embolism. Urinary retention. Past Surgical History: Epmpzi-q-Tryn placement left upper chest. Previous history IVC filter and removal. Breast biopsy. Cholecystectomy. Hysterectomy. Tonsillectomy. Bariatric surgery. Social History: Previous history of tobacco use. Family History: Chronic obstructive pulmonary disease. Diabetes mellitus. Exposure History: Unknown    Review of Systems: Unobtainable from patient    Vital Signs:  BP (!) 93/50   Pulse 75   Temp 97.8 °F (36.6 °C) (Temporal)   Resp 20   Ht 5' 1.02\" (1.55 m)   Wt 184 lb 14.4 oz (83.9 kg)   SpO2 100%   BMI 34.91 kg/m²  Temp (24hrs), Av.8 °F (36.6 °C), Min:97.3 °F (36.3 °C), Max:98 °F (36.7 °C)    Physical Exam:   Vital signs reviewed. No cervical, axillary, or inguinal adenopathy  Lungs slightly prolonged expiratory phase. No wheezing. Heart distant heart sounds without apparent murmur  Abdomen soft. Bowel sounds present.  No apparent mass or hepatosplenomegaly  Skin without drug rash  Per discussion with nursing moves extremities from sedation lightened    Lab Results:  CBC:   Recent Labs     19  0535 19  0400   WBC 9.1 8.3 8.7   HGB 11.1* 11.2* 11.2*   HCT 35.3* 35.5* 36.0*    158 166   LYMPHOPCT 4.3* 4.5* 6.2*   MONOPCT 8.2 7.3 6.4     CMP:   Recent Labs     19  0535  19  0535 19  0400 19  0439     --  140 139  --    K 3.8   < > 4.8 5.0 4.7     --  108 103  --    CO2 28  --  25 28  --    BUN 26*  --  23 24*  -- CREATININE 0.8  --  0.6 0.7  --    CALCIUM 8.3*  --  8.0* 8.3*  --    GLUCOSE 146*  --  132* 134*  --     < > = values in this interval not displayed. Culture: Blood cultures May 17, 2019-gram-positive cocci. Possibly to separate organisms. Final identification pending. Blood cultures May 18, 2019-no growth to date    Radiology: Chest x-ray on May 21, 2019:  Impression   1. Bilateral interstitial appearing infiltrate may be minimally   improved. This likely represents pulmonary edema. Pneumonia less   likely. 2. Mild blunting of the costophrenic angles. Signed by Dr Nuha Kirk on 5/21/2019 7:25 AM     Additional Studies Reviewed:     Impression:   1. Respiratory failure  2. Possible pneumonia/pulmonary edema/combination  3. Positive blood cultures-likely contaminant  4. History PE  5.  Chronic obstructive part disease    Recommendations:    Continue cefepime and levofloxacin  Feel vancomycin can be held after current dose  See if there were any positive blood cultures from her transferring facility  Await final ID from blood cultures and await results of May 18 blood cultures  Will continue to follow    Kristel Mooney MD  05/21/19  10:43 AM

## 2019-05-21 NOTE — PROGRESS NOTES
Pulmonary and Critical Care Progress Note [address] [facility]    Patient: Mahendra Left    1955    MR# [id number]    Acct# [de-identified]   05/21/19   7:45 AM  Referring Provider: Concha Ventura MD    Chief Complaint: Mechanically ventilated    Interval history: She remains sedated on mechanical ventilation. PO2 in the 60's on ABG. PEEP continued at 10. TV increased to 440 for better vent synchrony. No family at the bedside. CXR improved. ABGs showing some hypoxia on current settings. No family at the bedside. furosemide 20 mg Intravenous BID   vancomycin 1,000 mg Intravenous Q18H   gabapentin 300 mg Per NG tube TID   rOPINIRole 1 mg Per NG tube TID   FLUoxetine 20 mg Per NG tube Daily   methylPREDNISolone 40 mg Intravenous Q12H   ipratropium-albuterol 1 ampule Inhalation Q4H   cefepime 2 g Intravenous Q8H   And      levofloxacin 750 mg Intravenous Q24H   sodium chloride flush 10 mL Intravenous 2 times per day   vancomycin (VANCOCIN) intermittent dosing (placeholder)  Other RX Placeholder   enoxaparin 1 mg/kg Subcutaneous BID   polyvinyl alcohol 1 drop Both Eyes Q4H   And      AKWA TEARS  Both Eyes Q4H   famotidine (PEPCID) injection 20 mg Intravenous BID   chlorhexidine 15 mL Mouth/Throat BID      midazolam 6 mg/hr (05/20/19 1658)    dextrose 5% and 0.45% NaCl with KCl 20 mEq 100 mL/hr at 05/20/19 2330    propofol 30 mcg/kg/min (05/21/19 0406)     Review of Systems:     Cannot obtain due to mechanical ventilation    Physical Exam:  Blood pressure (!) 93/50, pulse 75, temperature 97.8 °F (36.6 °C), temperature source Temporal, resp. rate 20, height 5' 1.02\" (1.55 m), weight 184 lb 14.4 oz (83.9 kg), SpO2 100 %.     Intake/Output Summary (Last 24 hours) at 5/21/2019 0745  Last data filed at 5/21/2019 0716  Gross per 24 hour   Intake 4415.15 ml   Output 5225 ml   Net -809.85 ml        Vent Mode: AC/VC/Vt Ordered: 440 mL(Vt increased to 440 per order)/Rate Set: 20 bmp/ /PEEP/CPAP: 10/FiO2 : 40 %/ Peak Inspiratory Pressure: 46.7 cmH2O  Mean Airway Pressure: 15.9 cmH20  I:E Ratio: 1:3.41  Rate Measured: 20 br/min  Minute Volume: 16.6 Liters   plateau airway pressure 18    Physical Exam   Constitutional: She appears well-developed and well-nourished. No distress. She is sedated and intubated. HENT:   Head: Normocephalic and atraumatic. Eyes: Pupils are equal, round, and reactive to light. EOM are normal. No scleral icterus. Cardiovascular: Normal rate and regular rhythm. Pulmonary/Chest: No tachypnea. She is intubated. No respiratory distress. She has no wheezes. She has rhonchi (few) in the right lower field and the left lower field. She exhibits no tenderness. Abdominal: Soft. Bowel sounds are normal.   Musculoskeletal: Normal range of motion. She exhibits no edema. Neurological:   Intubated and sedated   Skin: Skin is warm and dry. Psychiatric: She has a normal mood and affect. Vitals reviewed. Recent Labs     05/19/19 0535 05/20/19 0535 05/21/19  0400   WBC 9.1 8.3 8.7   RBC 3.74* 3.74* 3.83*   HGB 11.1* 11.2* 11.2*   HCT 35.3* 35.5* 36.0*    158 166   MCV 94.4 94.9 94.0   MCH 29.7 29.9 29.2   MCHC 31.4* 31.5* 31.1*   RDW 15.5* 15.5* 15.5*      Recent Labs     05/19/19 0535 05/20/19 0535 05/21/19  0400 05/21/19  0439     --  140 139  --    K 3.8   < > 4.8 5.0 4.7     --  108 103  --    CO2 28  --  25 28  --    BUN 26*  --  23 24*  --    CREATININE 0.8  --  0.6 0.7  --    CALCIUM 8.3*  --  8.0* 8.3*  --    GLUCOSE 146*  --  132* 134*  --     < > = values in this interval not displayed.       Recent Labs     05/19/19  0458 05/20/19  0447 05/21/19  0439   PHART 7.440 7.390 7.410   YTP9WGY 42.0 44.0 47.0*   PO2ART 57.0* 84.0 64.0*   QAJ1QBJ 28.5* 26.6* 29.8*   O8TUPYNP 91.1 94.9 92.4   BEART 3.9* 1.3 4.4*     Recent Labs     05/21/19  0400   CALCIUM 8.3*     Gram stain Aerobic and Anaerobic bottles:   Gram positive cocci in chains and/or pairs   resembling Streptococcus   Culture in progress   Please notify Physician   Abnormal       Organism Streptococcus species  Abnormal        Radiograph:   EXAMINATION:  XR CHEST PORTABLE  5/21/2019 7:23 AM   HISTORY: Respiratory failure. On ventilator. COMPARISON: 5/20/2019. FINDINGS: Galindo Osier is bilateral interstitial appearing infiltrate that   may be minimally improved. There have been no tube or line changes. There is a port catheter on the left. Heart size is upper limits of   normal. There is minimal blunting of the costophrenic angles.       Impression   1. Bilateral interstitial appearing infiltrate may be minimally   improved. This likely represents pulmonary edema. Pneumonia less   likely. 2. Mild blunting of the costophrenic angles. Signed by Dr Taran Addison on 5/21/2019 7:25 AM       My radiograph interpretation: ETT in good position, bilateral infiltrates persist but appear to be improved    Pulmonary Assessment:    1. Acute respiratory failure with ards, CAP streptococcal, prob underlying copd  2. Hypernatremia improved  3. Metabolic alkalosis better    Recommend:     · Continue mechanical ventilation. · TV increased slightly to 440 for better vent synchrony   · Daily \"sedation vacation\". · Gentle diuresis  · Not ready for breathing trials due to PEEP demands  · Taper iv steroids    · Tube feed nutrition at goal  · Abx coverage  · Bronchodilators  · CXR/ABG daily while on the vent    Electronically signed by Tricia Blackmon on 5/21/2019 at 7:45 AM     Physician Substantive portion:  Pt without new complaints, remains sedated on vent on 10 cm peep, rr 20. Chest has diminished breath sounds. Flow monitor shows 0 end expiratory flow. Will try to reduce peep to 8 as tolerated, not ready for spontaneous breathing trials. I have seen and examined patient personally, performing a face-to-face diagnostic evaluation with plan of care reviewed and developed with APRN and nursing staff.  I have addended and/or modified the above history of present illness, physical examination, and assessment and plan to reflect my findings and impressions. Essential elements of the care plan were discussed with APRN above. Agree with findings and assessment/plan as documented above.     Electronically signed by Valente Cohen on 5/21/2019, 9:22 AM

## 2019-05-22 ENCOUNTER — OUTSIDE FACILITY SERVICE (OUTPATIENT)
Dept: PULMONOLOGY | Facility: CLINIC | Age: 64
End: 2019-05-22

## 2019-05-22 PROBLEM — E44.0 MODERATE MALNUTRITION (HCC): Chronic | Status: ACTIVE | Noted: 2019-01-01

## 2019-05-22 PROBLEM — Z51.5 PALLIATIVE CARE PATIENT: Status: ACTIVE | Noted: 2019-01-01

## 2019-05-22 PROCEDURE — 99233 SBSQ HOSP IP/OBS HIGH 50: CPT | Performed by: INTERNAL MEDICINE

## 2019-05-22 NOTE — PROGRESS NOTES
(155 cm)   · Current Body Wt: 183 lb 11 oz (83.3 kg)  · BMI Classification: BMI 30.0 - 34.9 Obese Class I    Nutrition Interventions:   Continue current Tube Feeding  Continued Inpatient Monitoring    Nutrition Evaluation:   · Evaluation: Goal achieved   · Goals: New Goal: Pt will tolerate EN   · Monitoring: Nutrition Progression, TF Intake, TF Tolerance, Skin Integrity, Weight, Pertinent Labs      Electronically signed by Ismael Becker RD, LD on 5/22/19 at 1:07 PM    Contact Number: [phone]

## 2019-05-22 NOTE — PLAN OF CARE
Nutrition Problem: Inadequate oral intake  Intervention: Food and/or Nutrient Delivery: Continue current Tube Feeding  Nutritional Goals: New Goal: Pt will tolerate EN

## 2019-05-22 NOTE — PROGRESS NOTES
Visited with pt's niece to provide support. Nurse, [place] and [place] says pt is supposed to be moved today or tomorrow to [facility]. Provided several clothing items and spoke with niece about about transportation. Niece says she has transportation and the pt's daughter is supposed to come. Spoke with nurse after visiting with niece who says that pt's niece will not be allowed to stay in a waiting area and she will speak to her about returning home. Niece and nurse expressed gratitude for spiritual care.        Electronically signed by Berenice Artis on 5/22/2019 at 2:33 PM

## 2019-05-22 NOTE — PROGRESS NOTES
Infectious Diseases Progress Note    Patient:  Luci Velasquez  YOB: 1955  MRN: [id number]   Admit date: 5/17/2019   Admitting Physician: Queenie Regalado MD  Primary Care Physician: No primary care provider on file. Chief Complaint/Interval History:  She remains on the ventilator. She appears to be stable overall from yesterday. She is without fever. Does not appear to have significant secretions and ET tube. Per nursing and chart review she may be transferred to long-term acute care tomorrow.     In/Out    Intake/Output Summary (Last 24 hours) at 5/22/2019 1653  Last data filed at 5/22/2019 1600  Gross per 24 hour   Intake 4201.77 ml   Output 4580 ml   Net -378.23 ml     Allergies: No Known Allergies  Current Meds:   ALPRAZolam (XANAX) tablet 0.25 mg TID PRN   atorvastatin (LIPITOR) tablet 20 mg Daily   folic acid (FOLVITE) tablet 1 mg Daily   HYDROcodone-acetaminophen (NORCO)  MG per tablet 1 tablet Q6H PRN   metoprolol tartrate (LOPRESSOR) tablet 25 mg BID   bisacodyl (DULCOLAX) suppository 10 mg Daily PRN   nystatin (MYCOSTATIN) 912778 UNIT/ML suspension 500,000 Units 4 times per day   furosemide (LASIX) injection 20 mg Daily   methylPREDNISolone sodium (SOLU-MEDROL) injection 20 mg Q12H   midazolam (VERSED) 100 mg in dextrose 5 % 100 mL infusion Continuous   vancomycin (VANCOCIN) 1000 mg in dextrose 5% 200 mL IVPB Q18H   midazolam (VERSED) injection 1 mg Q2H PRN   gabapentin (NEURONTIN) capsule 300 mg TID   rOPINIRole (REQUIP) tablet 1 mg TID   FLUoxetine (PROZAC) 20 MG/5ML solution 20 mg Daily   dextrose 5 % and 0.45 % NaCl with KCl 20 mEq infusion Continuous   albuterol (PROVENTIL) nebulizer solution 2.5 mg Q4H PRN   ipratropium-albuterol (DUONEB) nebulizer solution 1 ampule Q4H   ceFEPIme (MAXIPIME) 2 g in sterile water 20 mL IV syringe Q8H   And    levofloxacin (LEVAQUIN) 750 MG/150ML infusion 750 mg Q24H   sodium chloride flush 0.9 % injection 10 mL 2 times per day   sodium chloride improved. No   worsening. 2. Stable tubes. Signed by Dr Michelle Benoit on 5/22/2019 7:31 AM     Impression:  1. Acute respiratory failure. ARDS. Community-acquired pneumonia. Trying to locate cultures from her prior hospital.  2. Positive blood culture for Staphylococcus epidermidis and enterococcus-suspect these are contaminants  3.  Chronic obstructive pulmonary disease    Recommendations:  Continue current antibiotic treatment vancomycin/levofloxacin/cefepime  Hope to simplify antibiotic treatment see  Trying to get blood cultures from her prior hospital  Okay with me to LTAC  Continue to follow    Piper Almonte MD

## 2019-05-22 NOTE — PROGRESS NOTES
Contains abnormal data Blood gas, arterial   Status:  Final result    Ref Range & Units 05/22/19 0450   pH, Arterial 7.350 - 7.450 7.440    pCO2, Arterial 35.0 - 45.0 mmHg 50. 0High     pO2, Arterial 80.0 - 100.0 mmHg 82.0    HCO3, Arterial 22.0 - 26.0 mmol/L 34.0High     Base Excess, Arterial -2.0 - 2.0 mmol/L 8.5High     Hemoglobin, Art, Extended 12.0 - 16.0 g/dL 11.9Low     O2 Sat, Arterial >92 % 95.3    Carboxyhgb, Arterial 0.0 - 5.0 % 1.5    Comment:      0.0-1.5   (Smokers 1.5-5.0)    Methemoglobin, Arterial <1.5 % 1.0    O2 Content, Arterial Not Established mL/dL 16.0    O2 Therapy  Unknown    Resulting Agency  [facility]        Specimen Collected: 05/22/19 0450 Last Resulted: 05/22/19 0455         Patient on VC 16, 440, 45% + 8 peep. Drawn via right radial.   AT +.

## 2019-05-22 NOTE — PLAN OF CARE
Problem: Risk for Impaired Skin Integrity  Goal: Tissue integrity - skin and mucous membranes  Description  Structural intactness and normal physiological function of skin and  mucous membranes.   5/22/2019 1505 by Christal Fernando RN  Outcome: Ongoing    Problem: Falls - Risk of:  Goal: Will remain free from falls  Description  Will remain free from falls  5/22/2019 1505 by Christal Fernando RN  Outcome: Ongoing    Problem: Falls - Risk of:  Goal: Absence of physical injury  Description  Absence of physical injury  5/22/2019 1505 by Christal Fernando RN  Outcome: Ongoing     Problem: Nutrition  Goal: Optimal nutrition therapy  Description  Nutrition Problem: Inadequate oral intake  Intervention: Food and/or Nutrient Delivery: Continue current Tube Feeding  Nutritional Goals: New Goal: Pt will tolerate EN      5/22/2019 1505 by Christal Fernando RN  Outcome: Ongoing

## 2019-05-22 NOTE — PROGRESS NOTES
Hospitalist Progress Note  5/22/2019 9:09 AM  Subjective:   Admit Date: 5/17/2019  PCP: No primary care provider on file. Chief Complaint: SOB    Subjective: Patient intubated and sedated. Nods appropriately. Denies pain     Cumulative Hospital History:   Transfer from [facility] due to not improving respiratory failure suspected LLL PNA with possible ARDS and diastolic HF. She required intubation in transfer and was evaluated by pulmonology consultant. 05/21: Remains intubated/sedated. Appreciate ID recs regarding abx. Continue supportive care. Wean sedation as able, resume home meds. Patient did not tolerate lower doses of versed yesterday. Pulm following, patient may benefit from [facility] placement. ROS: Denies pain, unable to obtain full ROS    DIET TUBE FEED CONTINUOUS/CYCLIC NPO; Acute Lung Injury (ALI)/ARDS ([place]);  Orogastric; Continuous; 10; 37; 24    Intake/Output Summary (Last 24 hours) at 5/22/2019 0909  Last data filed at 5/22/2019 0600  Gross per 24 hour   Intake 3679.24 ml   Output 3650 ml   Net 29.24 ml     Medications:   midazolam 5 mg/hr (05/21/19 2100)    dextrose 5% and 0.45% NaCl with KCl 20 mEq 100 mL/hr at 05/22/19 0856    propofol 25 mcg/kg/min (05/21/19 1645)     Current Facility-Administered Medications   Medication Dose Route Frequency Provider Last Rate Last Dose    ALPRAZolam (XANAX) tablet 0.25 mg  0.25 mg Oral TID PRN Riley Valadez MD        atorvastatin (LIPITOR) tablet 20 mg  20 mg Oral Daily Riley Valadez MD        folic acid (FOLVITE) tablet 1 mg  1 mg Oral Daily Riley Valadez MD        HYDROcodone-acetaminophen [facility])  MG per tablet 1 tablet  1 tablet Oral Q6H PRN Riley Valadez MD        metoprolol tartrate (LOPRESSOR) tablet 25 mg  25 mg Oral BID Riley Valadez MD        furosemide (LASIX) injection 20 mg  20 mg Intravenous Daily DAXA Coleman   20 mg at 05/22/19 0848    methylPREDNISolone sodium (SOLU-MEDROL) injection 20 mg  20 mg Intravenous Q12H Pennelope Alyssa LUIS ALBERTO MorseN   20 mg at 05/22/19 0848    midazolam (VERSED) 100 mg in dextrose 5 % 100 mL infusion  1 mg/hr Intravenous Continuous Jos Mena MD 5 mL/hr at 05/21/19 2100 5 mg/hr at 05/21/19 2100    vancomycin (VANCOCIN) 1000 mg in dextrose 5% 200 mL IVPB  1,000 mg Intravenous Q18H Nati Hendricks MD   Stopped at 05/21/19 1900    midazolam (VERSED) injection 1 mg  1 mg Intravenous Q2H PRN Nati Hendricks MD   1 mg at 05/19/19 2015    gabapentin (NEURONTIN) capsule 300 mg  300 mg Per NG tube TID Nati Hendricks MD   300 mg at 05/22/19 0849    rOPINIRole (REQUIP) tablet 1 mg  1 mg Per NG tube TID Nati Hendricks MD   1 mg at 05/22/19 0849    FLUoxetine (PROZAC) 20 MG/5ML solution 20 mg  20 mg Per NG tube Daily Nati Hendricks MD   20 mg at 05/22/19 0848    dextrose 5 % and 0.45 % NaCl with KCl 20 mEq infusion   Intravenous Continuous Nati Hendricks  mL/hr at 05/22/19 0856      albuterol (PROVENTIL) nebulizer solution 2.5 mg  2.5 mg Nebulization Q4H PRN Nati Hendricks MD        ipratropium-albuterol (DUONEB) nebulizer solution 1 ampule  1 ampule Inhalation Q4H Nati Hendricks MD   1 ampule at 05/22/19 1739    ceFEPIme (MAXIPIME) 2 g in sterile water 20 mL IV syringe  2 g Intravenous Q8H Nati Hendricks MD   2 g at 05/22/19 0848    And    levofloxacin (LEVAQUIN) 750 MG/150ML infusion 750 mg  750 mg Intravenous Q24H Nati Hendricks MD   Stopped at 05/21/19 1800    sodium chloride flush 0.9 % injection 10 mL  10 mL Intravenous 2 times per day Nati Hendricks MD   10 mL at 05/22/19 0856    sodium chloride flush 0.9 % injection 10 mL  10 mL Intravenous PRN Nati Hendricks MD   10 mL at 05/21/19 1232    acetaminophen (TYLENOL) suppository 650 mg  650 mg Rectal Q4H PRN Nati Hendricks MD        vancomycin Becky Stock) intermittent dosing (placeholder)   Other RX Placeholder Nati Hendricks MD        enoxaparin (LOVENOX) injection 80 mg  1 mg/kg Subcutaneous BID Isaías Quijano MD   80 mg at 05/22/19 0848    polyvinyl alcohol (LIQUIFILM TEARS) 1.4 % ophthalmic solution 1 drop  1 drop Both Eyes Q4H Demetrius Mejia MD   1 drop at 05/22/19 7851    And    AKWA TEARS (LACRILUBE) 2-15-83 % opthalmic ointment   Both Eyes Q4H Demetrius Mejia MD        famotidine (PEPCID) injection 20 mg  20 mg Intravenous BID Demetrius Mejia MD   20 mg at 05/22/19 0848    chlorhexidine (PERIDEX) 0.12 % solution 15 mL  15 mL Mouth/Throat BID Demetrius Mejia MD   15 mL at 05/22/19 0849    morphine (PF) injection 2 mg  2 mg Intravenous Q1H PRN Demetrius Mejia MD   2 mg at 05/21/19 1230    propofol 1000 MG/100ML injection  10 mcg/kg/min Intravenous Titrated Demetrius Mejia MD 12.2 mL/hr at 05/21/19 1645 25 mcg/kg/min at 05/21/19 1645        Labs:     Recent Labs     05/20/19  0535 05/21/19  0400 05/22/19  0400   WBC 8.3 8.7 10.3   RBC 3.74* 3.83* 3.73*   HGB 11.2* 11.2* 11.2*   HCT 35.5* 36.0* 35.4*   MCV 94.9 94.0 94.9   MCH 29.9 29.2 30.0   MCHC 31.5* 31.1* 31.6*    166 166     Recent Labs     05/20/19  0535 05/21/19  0400 05/21/19  0439 05/22/19  0400 05/22/19  0450    139  --  139  --    K 4.8 5.0 4.7 4.8 4.6   ANIONGAP 7 8  --  5*  --     103  --  102  --    CO2 25 28  --  32*  --    BUN 23 24*  --  24*  --    CREATININE 0.6 0.7  --  0.6  --    GLUCOSE 132* 134*  --  114*  --    CALCIUM 8.0* 8.3*  --  8.0*  --      No results for input(s): MG, PHOS in the last 72 hours. No results for input(s): AST, ALT, ALB, BILITOT, ALKPHOS, ALB in the last 72 hours. ABGs:  Recent Labs     05/20/19  0447 05/21/19  0439 05/22/19  0450   PHART 7.390 7.410 7.440   SHB7FDC 44.0 47.0* 50.0*   PO2ART 84.0 64.0* 82.0   DTY3UAR 26.6* 29.8* 34.0*   BEART 1.3 4.4* 8.5*   HGBAE 11.6* 12.1 11.9*   A6JRHJWZ 94.9 92.4 95.3   CARBOXHGBART 1.6 1.7 1.5   02THERAPY Unknown Unknown Unknown     HgBA1c: No results for input(s): LABA1C in the last 72 hours.   FLP:  No results found for: TRIG, HDL, LDLCALC, LDLDIRECT, LABVLDL  TSH:  No results found for: TSH  Troponin T: No results for input(s): TROPONINI in the last 72 hours. INR: No results for input(s): INR in the last 72 hours. Objective:   Vitals: BP (!) 92/47   Pulse 74   Temp 97.2 °F (36.2 °C) (Temporal)   Resp 16   Ht 5' 1.02\" (1.55 m)   Wt 183 lb 11.2 oz (83.3 kg)   SpO2 97%   BMI 34.68 kg/m²   24HR INTAKE/OUTPUT:      Intake/Output Summary (Last 24 hours) at 5/22/2019 0565  Last data filed at 5/22/2019 0600  Gross per 24 hour   Intake 3679.24 ml   Output 3650 ml   Net 29.24 ml     General appearance: alert and cooperative with exam  HEENT: atraumatic, eyes with clear conjunctiva and normal lids  Lungs: no increased work of breathing, diminished BL, mechanical breath sounds  Heart: regular rate and rhythm and S1, S2 normal  Abdomen: soft, non-tender; bowel sounds normal; no masses,  no organomegaly  Extremities: 2+ BL LE edema, atraumatic  Neurologic: intubated/sedated  Skin: no rashes, nodules. Assessment and Plan:   Principal Problem:    Acute on chronic respiratory failure with hypoxia and hypercapnia (HCC)  Active Problems:    Left lower lobe pneumonia (HCC) Unable to determine organism    Chronic atrial fibrillation (HCC)    Personal history of PE (pulmonary embolism)    COPD (chronic obstructive pulmonary disease) (HCC)    Chronic diastolic congestive heart failure (HCC)    ARDS (adult respiratory distress syndrome) ([facility] [address]    Bacteremia  Resolved Problems:    * No resolved hospital problems. *    Remains intubated/sedated. Appreciate ID recs regarding abx. Continue supportive care. Wean sedation as able, resume home meds. Patient did not tolerate lower doses of versed yesterday. Pulm following, patient may benefit from [facility] placement.     Continue duonebs and IV steroids    Advance Directive: Full Code    DVT prophylaxis: lovenox    Discharge planning: MYNOR Limon MD  [facility] Hospitalist

## 2019-05-23 ENCOUNTER — OUTSIDE FACILITY SERVICE (OUTPATIENT)
Dept: PULMONOLOGY | Facility: CLINIC | Age: 64
End: 2019-05-23

## 2019-05-23 PROCEDURE — 99233 SBSQ HOSP IP/OBS HIGH 50: CPT | Performed by: INTERNAL MEDICINE

## 2019-05-23 NOTE — PROGRESS NOTES
Infectious Diseases Progress Note    Patient:  Juanito Ann  YOB: 1955  MRN: [id number]   Admit date: 5/17/2019   Admitting Physician: Ishmael New MD  Primary Care Physician: No primary care provider on file. Chief Complaint/Interval History:  Extubated earlier today. She indicates some mild difficulty breathing. Sats are primarily in the high 80s. She is about to start BiPAP. Nursing contacted Hospital from where she was transferred. He was able to get updated copies of culture reports. I could find one culture from blood which was no growth. Can identify no positive blood cultures locally. Nursing received a similar report as well.     In/Out    Intake/Output Summary (Last 24 hours) at 5/23/2019 1429  Last data filed at 5/23/2019 1411  Gross per 24 hour   Intake 3126.15 ml   Output 3455 ml   Net -328.85 ml     Allergies: No Known Allergies  Current Meds:   methylPREDNISolone sodium (SOLU-MEDROL) injection 10 mg Q12H   ALPRAZolam (XANAX) tablet 0.25 mg TID PRN   atorvastatin (LIPITOR) tablet 20 mg Daily   folic acid (FOLVITE) tablet 1 mg Daily   HYDROcodone-acetaminophen (NORCO)  MG per tablet 1 tablet Q6H PRN   metoprolol tartrate (LOPRESSOR) tablet 25 mg BID   bisacodyl (DULCOLAX) suppository 10 mg Daily PRN   nystatin (MYCOSTATIN) 130432 UNIT/ML suspension 500,000 Units 4 times per day   midazolam (VERSED) 100 mg in dextrose 5 % 100 mL infusion Continuous   vancomycin (VANCOCIN) 1000 mg in dextrose 5% 200 mL IVPB Q18H   midazolam (VERSED) injection 1 mg Q2H PRN   gabapentin (NEURONTIN) capsule 300 mg TID   rOPINIRole (REQUIP) tablet 1 mg TID   FLUoxetine (PROZAC) 20 MG/5ML solution 20 mg Daily   albuterol (PROVENTIL) nebulizer solution 2.5 mg Q4H PRN   ipratropium-albuterol (DUONEB) nebulizer solution 1 ampule Q4H   ceFEPIme (MAXIPIME) 2 g in sterile water 20 mL IV syringe Q8H   And    levofloxacin (LEVAQUIN) 750 MG/150ML infusion 750 mg Q24H   sodium chloride flush 0.9 % injection 10 mL 2 times per day   sodium chloride flush 0.9 % injection 10 mL PRN   acetaminophen (TYLENOL) suppository 650 mg Q4H PRN   vancomycin (VANCOCIN) intermittent dosing (placeholder) RX Placeholder   enoxaparin (LOVENOX) injection 80 mg BID   polyvinyl alcohol (LIQUIFILM TEARS) 1.4 % ophthalmic solution 1 drop Q4H   And    AKWA TEARS (LACRILUBE) 2-15-83 % opthalmic ointment Q4H   famotidine (PEPCID) injection 20 mg BID   chlorhexidine (PERIDEX) 0.12 % solution 15 mL BID   morphine (PF) injection 2 mg Q1H PRN   propofol 1000 MG/100ML injection Titrated     Review of Systems    VitalSigns:  BP (!) 108/59   Pulse 79   Temp 99.5 °F (37.5 °C) (Temporal)   Resp (!) 31   Ht 5' 1.02\" (1.55 m)   Wt 184 lb 3.2 oz (83.6 kg)   SpO2 (!) 89%   BMI 34.78 kg/m²      Physical Exam  Line/IV site: No erythema, warmth, induration, or tenderness. Lab Results:  CBC:   Recent Labs     05/21/19  0400 05/22/19  0400 05/23/19  0400   WBC 8.7 10.3 11.3*   HGB 11.2* 11.2* 11.0*    166 168     BMP:  Recent Labs     05/21/19  0400  05/22/19  0400  05/23/19  0400 05/23/19  0425 05/23/19  0927     --  139  --  138  --   --    K 5.0   < > 4.8   < > 4.5 4.4 4.0     --  102  --  101  --   --    CO2 28  --  32*  --  33*  --   --    BUN 24*  --  24*  --  23  --   --    CREATININE 0.7  --  0.6  --  0.5  --   --    GLUCOSE 134*  --  114*  --  112*  --   --     < > = values in this interval not displayed. CultureResults:  Blood cultures May 17, 2019-1 set with one of 2 bottles growing Staphylococcus epidermidis  Blood cultures May 17, 2019-1 set with enterococcus faecium  Blood cultures May 18, 2019-2 separate sets-all bottles no growth  See HPI-have not been able to locate a positive blood culture from her transferring institution    Radiology:   Chest x-ray today:  Impression   Bilateral infiltrates with no significant change. Probable   pulmonary edema. Pneumonia less likely.    Signed by Dr Juve Appiah on 5/23/2019 7:21 AM     Additional Studies Reviewed:  None    Impression:  1. Acute respiratory failure-she has had 7 days of broad antibiotic treatment here. She also had antibiotic treatment prior to transfer. Feel she has completed adequate antibiotic therapy for the possibility of pneumonia. 2. Positive blood culture for Staphylococcus epidermidis and enterococcus-contaminants  3.  Chronic obstructive pulmonary disease    Recommendations:  Discontinue current antibiotic treatment  Would follow off antimicrobial therapy    Riky Lassiter MD

## 2019-05-23 NOTE — PROGRESS NOTES
tablet at 05/22/19 1424    metoprolol tartrate (LOPRESSOR) tablet 25 mg  25 mg Oral BID Saw Wright MD   25 mg at 05/23/19 1774    bisacodyl (DULCOLAX) suppository 10 mg  10 mg Rectal Daily PRN Saw Wright MD   10 mg at 05/23/19 0600    nystatin (MYCOSTATIN) 126607 UNIT/ML suspension 500,000 Units  5 mL Oral 4 times per day Saw Wright MD   500,000 Units at 05/23/19 1759    midazolam (VERSED) 100 mg in dextrose 5 % 100 mL infusion  1 mg/hr Intravenous Continuous Saw Wright MD 2 mL/hr at 05/23/19 0935 2 mg/hr at 05/23/19 0935    vancomycin (VANCOCIN) 1000 mg in dextrose 5% 200 mL IVPB  1,000 mg Intravenous Q18H Lorraine Reardon MD   Stopped at 05/23/19 0550    midazolam (VERSED) injection 1 mg  1 mg Intravenous Q2H PRN Lorraine Reardon MD   1 mg at 05/19/19 2015    gabapentin (NEURONTIN) capsule 300 mg  300 mg Per NG tube TID Lorraine Reardon MD   300 mg at 05/23/19 0827    rOPINIRole (REQUIP) tablet 1 mg  1 mg Per NG tube TID Lorraine Reardon MD   1 mg at 05/23/19 0827    FLUoxetine (PROZAC) 20 MG/5ML solution 20 mg  20 mg Per NG tube Daily Lorraine Reardon MD   20 mg at 05/23/19 0828    albuterol (PROVENTIL) nebulizer solution 2.5 mg  2.5 mg Nebulization Q4H PRN Lorraine Reardon MD        ipratropium-albuterol (DUONEB) nebulizer solution 1 ampule  1 ampule Inhalation Q4H Lorraine Reardon MD   1 ampule at 05/23/19 1011    ceFEPIme (MAXIPIME) 2 g in sterile water 20 mL IV syringe  2 g Intravenous Q8H Lorraine Reardon MD   2 g at 05/23/19 0828    And    levofloxacin (LEVAQUIN) 750 MG/150ML infusion 750 mg  750 mg Intravenous Q24H Lorraine Reardon MD   Stopped at 05/22/19 1807    sodium chloride flush 0.9 % injection 10 mL  10 mL Intravenous 2 times per day Lorraine Reardon MD   10 mL at 05/23/19 0829    sodium chloride flush 0.9 % injection 10 mL  10 mL Intravenous PRN Lorraine Reardon MD   10 mL at 05/22/19 1711    acetaminophen (TYLENOL) suppository 650 mg  650 mg Rectal Q4H PRN Camilla Felder MD        vancomycin [place]) intermittent dosing (placeholder)   Other RX Placeholder Camilla Felder MD        enoxaparin (LOVENOX) injection 80 mg  1 mg/kg Subcutaneous BID Camilla Felder MD   80 mg at 05/23/19 0869    polyvinyl alcohol (LIQUIFILM TEARS) 1.4 % ophthalmic solution 1 drop  1 drop Both Eyes Q4H Elizabeth Brand MD   1 drop at 05/23/19 7893    And    AKWA TEARS (LACRILUBE) 2-15-83 % opthalmic ointment   Both Eyes Q4H Elizabeth Brand MD        famotidine (PEPCID) injection 20 mg  20 mg Intravenous BID Elizabeth Brand MD   20 mg at 05/23/19 7957    chlorhexidine (PERIDEX) 0.12 % solution 15 mL  15 mL Mouth/Throat BID Elizabeth Brand MD   15 mL at 05/22/19 2002    morphine (PF) injection 2 mg  2 mg Intravenous Q1H PRN Elizabeth Brand MD   2 mg at 05/21/19 1230    propofol 1000 MG/100ML injection  10 mcg/kg/min Intravenous Titrated Elizabeth Brand MD   Stopped at 05/23/19 0935        Labs:     Recent Labs     05/21/19  0400 05/22/19  0400 05/23/19  0400   WBC 8.7 10.3 11.3*   RBC 3.83* 3.73* 3.69*   HGB 11.2* 11.2* 11.0*   HCT 36.0* 35.4* 35.1*   MCV 94.0 94.9 95.1   MCH 29.2 30.0 29.8   MCHC 31.1* 31.6* 31.3*    166 168     Recent Labs     05/21/19  0400  05/22/19  0400  05/23/19  0400 05/23/19  0425 05/23/19  0927     --  139  --  138  --   --    K 5.0   < > 4.8   < > 4.5 4.4 4.0   ANIONGAP 8  --  5*  --  4*  --   --      --  102  --  101  --   --    CO2 28  --  32*  --  33*  --   --    BUN 24*  --  24*  --  23  --   --    CREATININE 0.7  --  0.6  --  0.5  --   --    GLUCOSE 134*  --  114*  --  112*  --   --    CALCIUM 8.3*  --  8.0*  --  7.9*  --   --     < > = values in this interval not displayed. No results for input(s): MG, PHOS in the last 72 hours. No results for input(s): AST, ALT, ALB, BILITOT, ALKPHOS, ALB in the last 72 hours.   ABGs:  Recent Labs     05/21/19  0439 05/22/19  0450 05/23/19  0425 05/23/19  0927   PHART 7.410 7.440 7.420 7.470*   ZOA6UOE 47.0* 50.0* 57.0* 51.0*   PO2ART 64.0* 82.0 95.0 56.0*   MXM1HEX 29.8* 34.0* 37.0* 37.1*   BEART 4.4* 8.5* 10.6* 11.6*   HGBAE 12.1 11.9* 11.5* 12.5   [id number] 92.4 95.3 95.5 91.1   CARBOXHGBART 1.7 1.5 1.7 1.7   02THERAPY Unknown Unknown Unknown Unknown     HgBA1c: No results for input(s): LABA1C in the last 72 hours. FLP:  No results found for: TRIG, HDL, LDLCALC, LDLDIRECT, LABVLDL  TSH:  No results found for: TSH  Troponin T: No results for input(s): TROPONINI in the last 72 hours. INR: No results for input(s): INR in the last 72 hours. Objective:   Vitals: /66   Pulse 84   Temp 97.5 °F (36.4 °C)   Resp 19   Ht 5' 1.02\" (1.55 m)   Wt 184 lb 3.2 oz (83.6 kg)   SpO2 95%   BMI 34.78 kg/m²   24HR INTAKE/OUTPUT:      Intake/Output Summary (Last 24 hours) at 5/23/2019 1020  Last data filed at 5/23/2019 1000  Gross per 24 hour   Intake 3852. 85 ml   Output 3850 ml   Net 2.85 ml     General appearance: intubated/sedated  HEENT: atraumatic, eyes with clear conjunctiva and normal lids  Lungs: no increased work of breathing, diminished BL, mechanical breath sounds  Heart: regular rate and rhythm and S1, S2 normal  Abdomen: soft, non-tender; bowel sounds normal; no masses,  no organomegaly  Extremities: 2+ BL LE edema, atraumatic  Neurologic: intubated/sedated  Skin: no rashes, nodules. Assessment and Plan:   Principal Problem:    Acute on chronic respiratory failure with hypoxia and hypercapnia (HCC)  Active Problems:    Left lower lobe pneumonia (HCC) Unable to determine organism    Chronic atrial fibrillation (HCC)    Personal history of PE (pulmonary embolism)    COPD (chronic obstructive pulmonary disease) ([facility])    Chronic diastolic congestive heart failure ([facility])    ARDS (adult respiratory distress syndrome) ([address]    Bacteremia  Resolved Problems:    * No resolved hospital problems. *     Plan to wean sedation and extubate if able.   If not able will transfer to [facility] later today. Discussed with pulm. Continue current regimen. Appreciate ID recs.     Continue duonebs and IV steroids    Advance Directive: Full Code    DVT prophylaxis: lovenox    Discharge planning: MYNOR Isaac MD  [facility] Hospitalist

## 2019-05-23 NOTE — PROGRESS NOTES
Pulmonary and Critical Care Progress Note [address] [facility]    Patient: Alma Delcid    1955    MR# [id number]    Acct# [de-identified]   05/23/19   7:55 AM  Referring Provider: Walter Johnston MD    Chief Complaint: Mechanically ventilated    Interval history: She remains sedated on mechanical ventilation. She is receiving propofol and versed. ABG/CXR improved. PEEP reduced to 6. Passively ventilating. Rate decreased to 12. Plans for transfer to LTAC this morning. No family present. No additional problems reported. Pt cannot provide history. atorvastatin 20 mg Oral Daily   folic acid 1 mg Oral Daily   metoprolol tartrate 25 mg Oral BID   nystatin 5 mL Oral 4 times per day   furosemide 20 mg Intravenous Daily   methylPREDNISolone 20 mg Intravenous Q12H   vancomycin 1,000 mg Intravenous Q18H   gabapentin 300 mg Per NG tube TID   rOPINIRole 1 mg Per NG tube TID   FLUoxetine 20 mg Per NG tube Daily   ipratropium-albuterol 1 ampule Inhalation Q4H   cefepime 2 g Intravenous Q8H   And      levofloxacin 750 mg Intravenous Q24H   sodium chloride flush 10 mL Intravenous 2 times per day   vancomycin (VANCOCIN) intermittent dosing (placeholder)  Other RX Placeholder   enoxaparin 1 mg/kg Subcutaneous BID   polyvinyl alcohol 1 drop Both Eyes Q4H   And      AKWA TEARS  Both Eyes Q4H   famotidine (PEPCID) injection 20 mg Intravenous BID   chlorhexidine 15 mL Mouth/Throat BID      midazolam 5 mg/hr (05/22/19 1128)    propofol 25 mcg/kg/min (05/21/19 1645)     Review of Systems:     Cannot obtain due to mechanical ventilation    Physical Exam:  Blood pressure 90/63, pulse 63, temperature 97.2 °F (36.2 °C), temperature source Temporal, resp. rate 16, height 5' 1.02\" (1.55 m), weight 184 lb 3.2 oz (83.6 kg), SpO2 97 %.     Intake/Output Summary (Last 24 hours) at 5/23/2019 0755  Last data filed at 5/23/2019 0600  Gross per 24 hour   Intake 5202.8 ml   Output 4275 ml   Net 927.8 ml        Vent Mode: AC/VC/Vt 10.6*     Recent Labs     05/23/19  0400   CALCIUM 7.9*     Gram stain Aerobic and Anaerobic bottles:   Gram positive cocci in chains and/or pairs   resembling Streptococcus   Culture in progress   Please notify Physician   Abnormal       Organism Streptococcus species  Abnormal        Radiograph:   EXAMINATION:  XR CHEST PORTABLE  5/23/2019 7:21 AM   HISTORY: Intubated. Respiratory failure. COMPARISON: 5/22/2019. FINDINGS: Hurley Racer is no significant change in bilateral infiltrates. No   significant pleural effusion is seen. There is mild cardiomegaly. There have been no tube or line changes.       Impression   Bilateral infiltrates with no significant change. Probable   pulmonary edema. Pneumonia less likely. Signed by Dr Belinda Posada on 5/23/2019 7:21 AM     My radiograph interpretation: ETT in good position, bilateral infiltrates persist     Pulmonary Assessment:    1. Acute respiratory failure with ards, CAP streptococcal, prob underlying copd  2. Hypernatremia improved  3. Metabolic alkalosis stable    Recommend:     · Continue mechanical ventilation. · PEEP to 6  · Rate to 12  · LTAC transfer this morning  · Reduce sedation and start breathing trials  · Taper iv steroids to 10mg BID  · Tube feed nutrition at goal  · Abx coverage per ID  · Bronchodilators  · CXR/ABG daily while on the vent    Electronically signed by Robert Baxter on 5/23/2019 at 7:55 AM     Physician Substantive portion:  When I open the patient's chart I get the following best practice advisory which required me to select one of 3 nonapplicable answers to the advisory:    [address] [place] [address] Rd has made the above calculation to raise concern for risk of sepsis in this patient who has been treated for 6 days in our intensive care unit for pneumonia with broad spectrum antibiotics and with no positive blood cultures for suspected pathogens. I selected the 3rd choice.   These things are not applicable as the patient is clearly not septic based on

## 2019-05-23 NOTE — PROGRESS NOTES
pH, Arterial 7.350 - 7.450 7.420    pCO2, Arterial 35.0 - 45.0 mmHg 57. 0High     pO2, Arterial 80.0 - 100.0 mmHg 95.0    HCO3, Arterial 22.0 - 26.0 mmol/L 37.0High     Base Excess, Arterial -2.0 - 2.0 mmol/L 10.6High     Hemoglobin, Art, Extended 12.0 - 16.0 g/dL 11.5Low     O2 Sat, Arterial >92 % 95.5    Carboxyhgb, Arterial 0.0 - 5.0 % 1.7    Comment:      0.0-1.5   (Smokers 1.5-5.0)    Methemoglobin, Arterial <1.5 % 1.2    O2 Content, Arterial Not Established mL/dL 15.6    O2 Therapy  Unknown    Resulting        VC,16,440,PEEP 8, 455  RIGHT RADIAL AT+

## 2019-05-23 NOTE — PROGRESS NOTES
Pulmonary and Critical Care Progress Note [address] [facility]    Patient: Saritha Pederson    1955    MR# [id number]    Acct# [de-identified]   05/22/19   9:19 PM  Referring Provider: Cheyenne Davis MD    Chief Complaint: Mechanically ventilated    Interval history: She remains sedated on mechanical ventilation. Nursing reports inability to reduce peep and she has not undergone spontaneous breathing trial.  No additional problems reported. Pt cannot provide history. atorvastatin 20 mg Oral Daily   folic acid 1 mg Oral Daily   metoprolol tartrate 25 mg Oral BID   nystatin 5 mL Oral 4 times per day   furosemide 20 mg Intravenous Daily   methylPREDNISolone 20 mg Intravenous Q12H   vancomycin 1,000 mg Intravenous Q18H   gabapentin 300 mg Per NG tube TID   rOPINIRole 1 mg Per NG tube TID   FLUoxetine 20 mg Per NG tube Daily   ipratropium-albuterol 1 ampule Inhalation Q4H   cefepime 2 g Intravenous Q8H   And      levofloxacin 750 mg Intravenous Q24H   sodium chloride flush 10 mL Intravenous 2 times per day   vancomycin (VANCOCIN) intermittent dosing (placeholder)  Other RX Placeholder   enoxaparin 1 mg/kg Subcutaneous BID   polyvinyl alcohol 1 drop Both Eyes Q4H   And      AKWA TEARS  Both Eyes Q4H   famotidine (PEPCID) injection 20 mg Intravenous BID   chlorhexidine 15 mL Mouth/Throat BID      midazolam 5 mg/hr (05/22/19 1128)    dextrose 5% and 0.45% NaCl with KCl 20 mEq 100 mL/hr at 05/22/19 1807    propofol 25 mcg/kg/min (05/21/19 1645)     Review of Systems:     Cannot obtain due to mechanical ventilation    Physical Exam:  Blood pressure (!) 95/47, pulse 68, temperature 97.1 °F (36.2 °C), temperature source Temporal, resp. rate 16, height 5' 1.02\" (1.55 m), weight 183 lb 11.2 oz (83.3 kg), SpO2 100 %.     Intake/Output Summary (Last 24 hours) at 5/22/2019 2119  Last data filed at 5/22/2019 2000  Gross per 24 hour   Intake 4351.97 ml   Output 4365 ml   Net -13.03 ml        Vent Mode: AC/VC/Vt Ordered: 440 mL/Rate Set: 16 bmp/ /PEEP/CPAP: 8/FiO2 : 45 %/   Peak Inspiratory Pressure: 41.1 cmH2O  Mean Airway Pressure: 11.8 cmH20  I:E Ratio: 1:5.11  Rate Measured: 16 br/min  Minute Volume: 9.1 Liters  Plateau Pressure: 24 [id number] airway pressure 18    Physical Exam   Constitutional: She appears well-developed and well-nourished. No distress. She is sedated and intubated. HENT:   Head: Normocephalic and atraumatic. Nose: Nose normal.   Eyes: No scleral icterus. Neck: Neck supple. No JVD present. Cardiovascular: Normal rate and regular rhythm. Pulmonary/Chest: No tachypnea. She is intubated. No respiratory distress. She has no wheezes. She has rhonchi (few) in the right lower field and the left lower field. She has no rales. She exhibits no tenderness. Abdominal: Soft. Bowel sounds are normal. She exhibits no distension. Musculoskeletal: Normal range of motion. She exhibits no edema. Neurological:   Sedated and unresponsive   Skin: Skin is warm and dry. Psychiatric: She has a normal mood and affect.      Recent Labs     05/20/19  0535 05/21/19  0400 05/22/19  0400   WBC 8.3 8.7 10.3   RBC 3.74* 3.83* 3.73*   HGB 11.2* 11.2* 11.2*   HCT 35.5* 36.0* 35.4*    166 166   MCV 94.9 94.0 94.9   MCH 29.9 29.2 30.0   MCHC 31.5* 31.1* 31.6*   RDW 15.5* 15.5* 15.4*      Recent Labs     05/20/19  0535 05/21/19  0400 05/21/19  0439 05/22/19  0400 05/22/19  0450    139  --  139  --    K 4.8 5.0 4.7 4.8 4.6    103  --  102  --    CO2 25 28  --  32*  --    BUN 23 24*  --  24*  --    CREATININE 0.6 0.7  --  0.6  --    CALCIUM 8.0* 8.3*  --  8.0*  --    GLUCOSE 132* 134*  --  114*  --       Recent Labs     05/20/19  0447 05/21/19  0439 05/22/19  0450   PHART 7.390 7.410 7.440   WKF5RES 44.0 47.0* 50.0*   PO2ART 84.0 64.0* 82.0   PPC9MHE 26.6* 29.8* 34.0*   [id number] 94.9 92.4 95.3   BEART 1.3 4.4* 8.5*     Recent Labs     05/22/19  0400   CALCIUM 8.0*     Gram stain Aerobic and Anaerobic bottles: Gram positive cocci in chains and/or pairs   resembling Streptococcus   Culture in progress   Please notify Physician   Abnormal       Organism Streptococcus species  Abnormal        Radiograph:   EXAMINATION:  XR CHEST PORTABLE  5/21/2019 7:23 AM   HISTORY: Respiratory failure. On ventilator. COMPARISON: 5/20/2019. FINDINGS: Mary Jo Mas is bilateral interstitial appearing infiltrate that   may be minimally improved. There have been no tube or line changes. There is a port catheter on the left. Heart size is upper limits of   normal. There is minimal blunting of the costophrenic angles.       Impression   1. Bilateral interstitial appearing infiltrate may be minimally   improved. This likely represents pulmonary edema. Pneumonia less   likely. 2. Mild blunting of the costophrenic angles. Signed by Dr Carlito Patel on 5/21/2019 7:25 AM       My radiograph interpretation: ETT in good position, bilateral infiltrates persist but appear to be improved    Pulmonary Assessment:    1. Acute respiratory failure with ards, CAP streptococcal, prob underlying copd  2. Hypernatremia improved  3. Metabolic alkalosis stable    Recommend:     · Continue mechanical ventilation.    · Continue current vent settings  · I received call from [address] at [facility]; anticipate transfer in am  · Try to start spontaneous breathing trials in am  · Taper iv steroids    · Tube feed nutrition at goal  · Abx coverage  · Bronchodilators  · CXR/ABG daily while on the vent    Electronically signed by Marlene German on 5/22/2019 at 9:19 PM

## 2019-05-23 NOTE — DISCHARGE SUMMARY
Discharge Summary    Lupis Nogueira  :  1955  MRN:  [id number]    Admit date:  2019  Discharge date:      Admitting Physician:  Allen Estevez MD    Advance Directive: Full Code    Consults: pulmonary/intensive care and ID    Primary Care Physician:  No primary care provider on file. Discharge Diagnoses:  Principal Problem:    Acute on chronic respiratory failure with hypoxia and hypercapnia (HCC)  Active Problems:    Left lower lobe pneumonia (HCC)    Chronic atrial fibrillation (HCC)    Personal history of PE (pulmonary embolism)    COPD (chronic obstructive pulmonary disease) (HCC)    Chronic diastolic congestive heart failure (HCC)    ARDS (adult respiratory distress syndrome) (HCC)    Bacteremia    Palliative care patient    Moderate malnutrition ([facility] [address]  Resolved Problems:    * No resolved hospital problems. *      Significant Diagnostic Studies:   Xr Chest Portable    Result Date: 2019  XR CHEST PORTABLE 2019 12:00 PM HISTORY: Repositioned enteric tube Comparison: 2019 Findings: Repositioned enteric tube with tube appearing in good position. Tip and sidehole are beyond the GE junction. Lines and tubes are otherwise stable. Stable bilateral airspace opacities, most notable in the left base. The cardiomediastinal silhouette and pulmonary vascularity are unchanged. No acute osseous or soft tissue abnormality is noted. Impression: 1. Repositioned enteric tube with tube appearing in good position. Otherwise stable. Signed by Dr Kodi Maya on 2019 1:23 PM    Xr Chest Portable    Result Date: 2019  XR CHEST PORTABLE 2019 8:30 AM HISTORY: NG tube insertion Comparison: 2019 Findings: New enteric tube with tip coursing below the diaphragm. Tip is beyond the field-of-view. Lines and tubes are otherwise in stable position. Reidentified bilateral airspace opacities, most notable being the consolidation in the left lung base.  Cardiac mediastinal silhouette and pulmonary vasculature are stable. Impression: 1. New enteric tube with tip coursing below the diaphragm. Tube tip is beyond the lower edge of the field-of-view. 2. Otherwise stable exam. Signed by Dr Lanette Fothergill on 5/18/2019 11:38 AM    Xr Chest Portable    Result Date: 5/17/2019  Examination. XR CHEST PORTABLE History: Tube placement. A frontal portable supine view of the chest is obtained. There is no previous study for comparison. There is extensive interstitial infiltrate in the lungs bilaterally more pronounced in the left lower lung. In the absence of a previous study for comparison the age of this infiltrate is not certain. An endotracheal tube is seen with distal end 5 cm above trachea bifurcation. This is in optimal position. A left-sided MediPort system is seen introduced through the left internal jugular vein. The distal end is in the distal superior vena cava. No pneumothorax. There is no bony abnormality. The distal end of the endotracheal tube is 5 cm above trachea bifurcation. This is an optimal position. Extensive interstitial infiltrate in the lungs bilaterally more severely in the left lower lung. This may represent acute or chronic interstitial pneumonitis. A MediPort system in place. Above findings are recorded on a digital voice clip in PACS. Signed by Dr Jesica Morelos on 5/17/2019 4:11 PM      Pertinent Labs:   CBC:   Recent Labs     05/21/19  0400 05/22/19  0400 05/23/19  0400   WBC 8.7 10.3 11.3*   HGB 11.2* 11.2* 11.0*    166 168     BMP:    Recent Labs     05/21/19  0400  05/22/19  0400 05/22/19  0450 05/23/19  0400 05/23/19  0425     --  139  --  138  --    K 5.0   < > 4.8 4.6 4.5 4.4     --  102  --  101  --    CO2 28  --  32*  --  33*  --    BUN 24*  --  24*  --  23  --    CREATININE 0.7  --  0.6  --  0.5  --    GLUCOSE 134*  --  114*  --  112*  --     < > = values in this interval not displayed.      INR: No results for input(s): INR in the last 72 hours. Lipids: No results for input(s): CHOL, HDL in the last 72 hours. Invalid input(s): LDLCALCU  ABGs:  Recent Labs     05/21/19  0439 05/22/19  0450 05/23/19  0425   PHART 7.410 7.440 7.420   TIL3BQP 47.0* 50.0* 57.0*   PO2ART 64.0* 82.0 95.0   ATS7YNR 29.8* 34.0* 37.0*   BEART 4.4* 8.5* 10.6*   HGBAE 12.1 11.9* 11.5*   [id number] 92.4 95.3 95.5   CARBOXHGBART 1.7 1.5 1.7   02THERAPY Unknown Unknown Unknown     HgBA1c:  No results for input(s): LABA1C in the last 72 hours. Procedures: none  Hospital Course:   Transfer from [facility] due to not improving respiratory failure suspected LLL PNA with possible ARDS and diastolic HF. She required intubation in transfer and was evaluated by pulmonology consultant.     05/21: Remains intubated/sedated. Appreciate ID recs regarding abx. Continue supportive care. Wean sedation as able, resume home meds. Patient did not tolerate lower doses of versed yesterday. Pulm following, patient may benefit from [facility] placement. 05/23: Patient has been accepted to [facility]. Ok to d/c per pulm and ID. Will remain on current regimen of abx. Plan to wean sedation as able and continue optimiizing respiratory status prior to liberating from vent. No acute changes reported per nursing. Physical Exam:  Vital Signs: BP 90/63   Pulse 63   Temp 97.2 °F (36.2 °C) (Temporal)   Resp 16   Ht 5' 1.02\" (1.55 m)   Wt 184 lb 3.2 oz (83.6 kg)   SpO2 97%   BMI 34.78 kg/m²   General appearance: alert and cooperative with exam  HEENT: atraumatic, eyes with clear conjunctiva and normal lids  Lungs: no increased work of breathing, diminished BL, mechanical breath sounds  Heart: regular rate and rhythm and S1, S2 normal  Abdomen: soft, non-tender; bowel sounds normal; no masses,  no organomegaly  Extremities: 2+ BL LE edema, atraumatic  Neurologic: intubated/sedated  Skin: no rashes, nodules.         Discharge Medications:       Abdoul Lee   Home Medication Instructions mL intravenously every 2 hours as needed (anxiety) for up to 30 days. nystatin (MYCOSTATIN) 675795 UNIT/ML suspension  Take 5 mLs by mouth 4 times daily             polyvinyl alcohol (LIQUIFILM TEARS) 1.4 % ophthalmic solution  Place 1 drop into both eyes every 4 hours             propofol 1000 MG/100ML injection  Infuse 810 mcg/min intravenously continuous             rOPINIRole (REQUIP) 1 MG tablet  Take 1 mg by mouth 2 times daily             vancomycin (VANCOCIN) 1-5 GM/200ML-% SOLN  Infuse 200 mLs intravenously Q18H                 Discharge Instructions: Follow up with provider upon arrival to LTAC    Diet: DIET TUBE FEED CONTINUOUS/CYCLIC NPO; Acute Lung Injury (ALI)/ARDS ([place]); Orogastric; Continuous; 10; 37; 24     Disposition: LTAC    Time spent on discharge 31 minutes.     Signed:  Elena Merritt MD

## 2019-05-24 ENCOUNTER — OUTSIDE FACILITY SERVICE (OUTPATIENT)
Dept: PULMONOLOGY | Facility: CLINIC | Age: 64
End: 2019-05-24

## 2019-05-24 PROCEDURE — 99232 SBSQ HOSP IP/OBS MODERATE 35: CPT | Performed by: INTERNAL MEDICINE

## 2019-05-24 NOTE — PROGRESS NOTES
Follow up:  Pt was sitting up in the chair with oxygen on via n/c. She has bipap PRN per staff. Family present and pt is moving to the floor today. Plan for LTAC.     Support offered to pt and family  Palliative Care will follow POC    Electronically signed by Berenda Apgar, RN on 2019 at 10:43 AM

## 2019-05-24 NOTE — PROGRESS NOTES
Infectious Diseases Progress Note    Patient:  Euncie Vital  YOB: 1955  MRN: [id number]   Admit date: 5/17/2019   Admitting Physician: Merlinda Penning, DO  Primary Care Physician: No primary care provider on file. Chief Complaint/Interval History:  She seems to be breathing easier. She is on nasal cannula oxygen. No productive cough or sputum production. No chest pain or chest pressure. She is without fever or night sweats. Transferred out of ICU. Now on telemetry floor. Interval notes reviewed. In/Out    Intake/Output Summary (Last 24 hours) at 5/24/2019 1450  Last data filed at 5/24/2019 1100  Gross per 24 hour   Intake 1300 ml   Output 2415 ml   Net -1115 ml     Allergies: No Known Allergies  Current Meds:   FLUoxetine (PROZAC) capsule 20 mg Daily   clonazePAM (KLONOPIN) tablet 0.5 mg Nightly   methylPREDNISolone sodium (SOLU-MEDROL) injection 10 mg Q12H   bisacodyl (DULCOLAX) suppository 10 mg Daily PRN   ALPRAZolam (XANAX) tablet 0.25 mg TID PRN   atorvastatin (LIPITOR) tablet 20 mg Daily   folic acid (FOLVITE) tablet 1 mg Daily   HYDROcodone-acetaminophen (NORCO)  MG per tablet 1 tablet Q6H PRN   metoprolol tartrate (LOPRESSOR) tablet 25 mg BID   nystatin (MYCOSTATIN) 915000 UNIT/ML suspension 500,000 Units 4 times per day   midazolam (VERSED) injection 1 mg Q2H PRN   rOPINIRole (REQUIP) tablet 1 mg TID   albuterol (PROVENTIL) nebulizer solution 2.5 mg Q4H PRN   ipratropium-albuterol (DUONEB) nebulizer solution 1 ampule Q4H   sodium chloride flush 0.9 % injection 10 mL 2 times per day   sodium chloride flush 0.9 % injection 10 mL PRN   acetaminophen (TYLENOL) suppository 650 mg Q4H PRN   enoxaparin (LOVENOX) injection 80 mg BID   morphine (PF) injection 2 mg Q1H PRN     Review of Systems see HPI.     VitalSigns:  /62   Pulse 80   Temp 98.6 °F (37 °C) (Temporal)   Resp 18   Ht 5' 1.02\" (1.55 m)   Wt 171 lb 14.4 oz (78 kg)   SpO2 90%   BMI 32.45 kg/m²      Physical Exam  General looks more comfortable today. Lungs without wheezing  Slightly prolonged expiratory phase  Abdomen soft and nontender  Port site left upper chest without erythema    Lab Results:  CBC:   Recent Labs     05/22/19  0400 05/23/19  0400 05/24/19  0220   WBC 10.3 11.3* 13.2*   HGB 11.2* 11.0* 11.6*    168 190     BMP:  Recent Labs     05/22/19  0400  05/23/19  0400 05/23/19  0425 05/23/19  0927 05/24/19  0220     --  138  --   --  144   K 4.8   < > 4.5 4.4 4.0 4.2     --  101  --   --  103   CO2 32*  --  33*  --   --  32*   BUN 24*  --  23  --   --  20   CREATININE 0.6  --  0.5  --   --  0.7   GLUCOSE 114*  --  112*  --   --  99    < > = values in this interval not displayed.      Blood cultures May 17, 2019-1 set with enterococcus and 1 set with coagulase-negative staph  Blood cultures May 18, 2019-no growth    Impression:  Pneumonia-treated-suspect resolved  Mild leukocytosis-likely secondary to steroids  Positive blood cultures from May 17, 2019-contaminant    Recommendations:  She appears to be stable off antibiotic treatment  Will sign off for now  Would be happy to reassess if recurrent fever or other concerns for infection  She can otherwise follow up with infectious diseases when necessary    Juan Gonzales MD

## 2019-05-24 NOTE — PROGRESS NOTES
Hospitalist Progress Note    Patient:  Jaimie Uriostegui  YOB: 1955  Date of Service: 5/24/2019  MRN: [id number]   Acct: [de-identified]   Primary Care Physician: No primary care provider on file. Advance Directive: Full Code  Admit Date: 5/17/2019       Hospital Day: 7  Referring Provider: Minerva Alegre DO    Patient Seen, Chart, Consults, Notes, Labs, Radiology studies reviewed. Subjective:  Jaimie Uriostegui is a 61 y.o. female  whom we are following for hypoxemic respiratory failure, ARDS, COPD. She was tentatively to be transferred to an LTAC as she was having a difficult time being weaned from the ventilator. She was successfully extubated yesterday, and is doing very well. At this point, I feel she is stable enough to go to the floor. Allergies:  Patient has no known allergies.     Medicines:  Current Facility-Administered Medications   Medication Dose Route Frequency Provider Last Rate Last Dose    propofol 1000 MG/100ML injection  10 mcg/kg/min Intravenous Titrated Minerva Alegre DO        methylPREDNISolone sodium (SOLU-MEDROL) injection 10 mg  10 mg Intravenous Q12H DAXA Segura   10 mg at 05/24/19 0802    bisacodyl (DULCOLAX) suppository 10 mg  10 mg Rectal Daily PRN Joni Jesus MD   10 mg at 05/23/19 1840    ALPRAZolam [place]) tablet 0.25 mg  0.25 mg Oral TID PRN Joni Jesus MD        atorvastatin (LIPITOR) tablet 20 mg  20 mg Oral Daily Joni Jesus MD   20 mg at 29/59/87 6409    folic acid (FOLVITE) tablet 1 mg  1 mg Oral Daily Joni Jesus MD   1 mg at 05/24/19 0802    HYDROcodone-acetaminophen (Noelle Moan)  MG per tablet 1 tablet  1 tablet Oral Q6H PRN Joni Jesus MD   1 tablet at 05/24/19 0802    metoprolol tartrate (LOPRESSOR) tablet 25 mg  25 mg Oral BID Joni Jesus MD   25 mg at 05/23/19 3310    nystatin (MYCOSTATIN) 720542 UNIT/ML suspension 500,000 Units  5 mL Oral 4 times per day Joni Jesus MD History:   Diagnosis Date    A-fib [facility])     CHF (congestive heart failure) (HCC)     COPD (chronic obstructive pulmonary disease) (HCC)     Encephalopathy     Fibromyalgia     Hyperlipidemia     Hypertension     Obesity     Palliative care patient 05/20/2019    PE (pulmonary thromboembolism) ([facility] [address]     Urinary retention        Past Surgical History:  Past Surgical History:   Procedure Laterality Date    GASTRIC BYPASS SURGERY  10/2018       Family History  History reviewed. No pertinent family history.     Social History  Social History     Socioeconomic History    Marital status:      Spouse name: Not on file    Number of children: Not on file    Years of education: Not on file    Highest education level: Not on file   Occupational History    Not on file   Social Needs    Financial resource strain: Not on file    Food insecurity:     Worry: Not on file     Inability: Not on file    Transportation needs:     Medical: Not on file     Non-medical: Not on file   Tobacco Use    Smoking status: Not on file   Substance and Sexual Activity    Alcohol use: Not on file    Drug use: Not on file    Sexual activity: Not on file   Lifestyle    Physical activity:     Days per week: Not on file     Minutes per session: Not on file    Stress: Not on file   Relationships    Social connections:     Talks on phone: Not on file     Gets together: Not on file     Attends Sabianist service: Not on file     Active member of club or organization: Not on file     Attends meetings of clubs or organizations: Not on file     Relationship status: Not on file    Intimate partner violence:     Fear of current or ex partner: Not on file     Emotionally abused: Not on file     Physically abused: Not on file     Forced sexual activity: Not on file   Other Topics Concern    Not on file   Social History Narrative    Not on file         Review of Systems:    Review of Systems   Constitutional: Negative for activity change and fatigue. Respiratory: Positive for shortness of breath. Negative for cough. Cardiovascular: Negative for chest pain and leg swelling. Gastrointestinal: Negative for constipation, diarrhea, nausea and vomiting. Genitourinary: Negative for difficulty urinating and dysuria. Musculoskeletal: Negative for arthralgias and back pain. Neurological: Negative for dizziness and headaches. Objective:  Blood pressure (!) 98/54, pulse 79, temperature 98 °F (36.7 °C), temperature source Temporal, resp. rate 17, height 5' 1.02\" (1.55 m), weight 171 lb 14.4 oz (78 kg), SpO2 (!) 86 %. Intake/Output Summary (Last 24 hours) at 5/24/2019 0939  Last data filed at 5/24/2019 0800  Gross per 24 hour   Intake 1321.95 ml   Output 3990 ml   Net -2668.05 ml       Physical Exam   Constitutional: She is oriented to person, place, and time. She appears well-developed and well-nourished. HENT:   Head: Normocephalic and atraumatic. Mouth/Throat: Oropharynx is clear and moist.   Eyes: Pupils are equal, round, and reactive to light. Conjunctivae are normal.   Neck: No JVD present. Cardiovascular: Normal rate, regular rhythm and normal heart sounds. Pulmonary/Chest: Effort normal. She has rales. Abdominal: Soft. Musculoskeletal: Normal range of motion. Neurological: She is alert and oriented to person, place, and time. Skin: Skin is warm and dry. Vitals reviewed. Labs:  BMP:   Recent Labs     05/22/19  0400 05/23/19  0400 05/23/19  0425 05/23/19  0927 05/24/19  0220     --  138  --   --  144   K 4.8   < > 4.5 4.4 4.0 4.2     --  101  --   --  103   CO2 32*  --  33*  --   --  32*   BUN 24*  --  23  --   --  20   CREATININE 0.6  --  0.5  --   --  0.7   CALCIUM 8.0*  --  7.9*  --   --  8.1*    < > = values in this interval not displayed.      CBC:   Recent Labs     05/22/19  0400 05/23/19  0400 05/24/19  0220   WBC 10.3 11.3* 13.2*   HGB 11.2* 11.0* 11.6*   HCT 35.4* 35.1* 37.4   MCV 94.9 95.1 93.5    168 190     LIVER PROFILE: No results for input(s): AST, ALT, LIPASE, BILIDIR, BILITOT, ALKPHOS in the last 72 hours. Invalid input(s): AMYLASE,  ALB  PT/INR: No results for input(s): PROTIME, INR in the last 72 hours. APTT: No results for input(s): APTT in the last 72 hours. BNP:  No results for input(s): BNP in the last 72 hours. Ionized Calcium:No results for input(s): IONCA in the last 72 hours. Magnesium:No results for input(s): MG in the last 72 hours. Phosphorus:No results for input(s): PHOS in the last 72 hours. HgbA1C: No results for input(s): LABA1C in the last 72 hours. Hepatic: No results for input(s): ALKPHOS, ALT, AST, PROT, BILITOT, BILIDIR, LABALBU in the last 72 hours. Lactic Acid: No results for input(s): LACTA in the last 72 hours. Troponin: No results for input(s): CKTOTAL, CKMB, TROPONINT in the last 72 hours. ABGs: No results for input(s): PH, PCO2, PO2, HCO3, O2SAT in the last 72 hours. CRP:  No results for input(s): CRP in the last 72 hours. Sed Rate:  No results for input(s): SEDRATE in the last 72 hours. Cultures:   No results for input(s): CULTURE in the last 72 hours. No results for input(s): BC, Zaki Left in the last 72 hours. No results for input(s): CXSURG in the last 72 hours. Radiology reports as per the Radiologist  Radiology: Xr Chest Portable    Result Date: 5/18/2019  XR CHEST PORTABLE 5/18/2019 12:00 PM HISTORY: Repositioned enteric tube Comparison: 5/18/2019 Findings: Repositioned enteric tube with tube appearing in good position. Tip and sidehole are beyond the GE junction. Lines and tubes are otherwise stable. Stable bilateral airspace opacities, most notable in the left base. The cardiomediastinal silhouette and pulmonary vascularity are unchanged. No acute osseous or soft tissue abnormality is noted. Impression: 1. Repositioned enteric tube with tube appearing in good position. Otherwise stable.  Signed by Dr Nancy Evangelista Edson on 5/18/2019 1:23 PM    Xr Chest Portable    Result Date: 5/18/2019  XR CHEST PORTABLE 5/18/2019 8:30 AM HISTORY: NG tube insertion Comparison: 5/17/2019 Findings: New enteric tube with tip coursing below the diaphragm. Tip is beyond the field-of-view. Lines and tubes are otherwise in stable position. Reidentified bilateral airspace opacities, most notable being the consolidation in the left lung base. Cardiac mediastinal silhouette and pulmonary vasculature are stable. Impression: 1. New enteric tube with tip coursing below the diaphragm. Tube tip is beyond the lower edge of the field-of-view. 2. Otherwise stable exam. Signed by Dr Leandra Zuniga on 5/18/2019 11:38 AM    Xr Chest Portable    Result Date: 5/17/2019  Examination. XR CHEST PORTABLE History: Tube placement. A frontal portable supine view of the chest is obtained. There is no previous study for comparison. There is extensive interstitial infiltrate in the lungs bilaterally more pronounced in the left lower lung. In the absence of a previous study for comparison the age of this infiltrate is not certain. An endotracheal tube is seen with distal end 5 cm above trachea bifurcation. This is in optimal position. A left-sided MediPort system is seen introduced through the left internal jugular vein. The distal end is in the distal superior vena cava. No pneumothorax. There is no bony abnormality. The distal end of the endotracheal tube is 5 cm above trachea bifurcation. This is an optimal position. Extensive interstitial infiltrate in the lungs bilaterally more severely in the left lower lung. This may represent acute or chronic interstitial pneumonitis. A MediPort system in place. Above findings are recorded on a digital voice clip in PACS. Signed by Dr Bernard Stone on 5/17/2019 4:11 PM       Assessment   Hypoxemic respiratory failure. COPD. Plan:  Transfer to the floor. Continue supportive care.     Cherise Solorio DO

## 2019-05-24 NOTE — PLAN OF CARE
Nutrition Problem: Inadequate oral intake  Intervention: Food and/or Nutrient Delivery: Continue current diet, Start ONS  Nutritional Goals: New Goal: Pt will consume 50% or more of meals and supplements with no s/s intolerance

## 2019-05-24 NOTE — PROGRESS NOTES
Pulmonary and Critical Care Progress Note [facility]    Patient: Josy Arboleda  1955   MR# [id number]   Acct# [de-identified]  05/24/19   10:45 AM  Referring Provider: Deadra Bamberger, DO    Chief Complaint: breathing problems and respiratory failure  Interval history: Patient feels much better following extubation. Epic brought up the issue of \"early detection of sepsis\" yesterday. I still do not think the patient is septic. FLUoxetine 20 mg Oral Daily   methylPREDNISolone 10 mg Intravenous Q12H   atorvastatin 20 mg Oral Daily   folic acid 1 mg Oral Daily   metoprolol tartrate 25 mg Oral BID   nystatin 5 mL Oral 4 times per day   rOPINIRole 1 mg Per NG tube TID   ipratropium-albuterol 1 ampule Inhalation Q4H   sodium chloride flush 10 mL Intravenous 2 times per day   enoxaparin 1 mg/kg Subcutaneous BID       Review of Systems:   Review of Systems   Constitutional: Negative for chills and fever. Cardiovascular: Negative for leg swelling. Gastrointestinal: Negative for nausea and vomiting. Physical Exam:  Blood pressure (!) 98/54, pulse 79, temperature 98 °F (36.7 °C), temperature source Temporal, resp. rate 21, height 5' 1.02\" (1.55 m), weight 171 lb 14.4 oz (78 kg), SpO2 (!) 89 %. Intake/Output Summary (Last 24 hours) at 5/24/2019 1045  Last data filed at 5/24/2019 0800  Gross per 24 hour   Intake 1301.1 ml   Output 3140 ml   Net -1838.9 ml     Physical Exam   Constitutional: She appears well-developed and well-nourished. She is active. Non-toxic appearance. She does not have a sickly appearance. She appears ill. No distress. Nasal cannula in place. HENT:   Head: Normocephalic and atraumatic. Eyes: Pupils are equal, round, and reactive to light. EOM are normal. No scleral icterus. Cardiovascular: Normal rate and regular rhythm. Pulmonary/Chest: Effort normal. No respiratory distress. She has no wheezes. She exhibits no tenderness. Abdominal: Soft.  Bowel sounds are

## 2019-05-24 NOTE — PROGRESS NOTES
Intake, Supplement Intake, Diet Tolerance, Weight, Pertinent Labs      Electronically signed by Courtney Sloan RD, LD on 5/24/19 at 2:22 PM    Contact Number: [phone]

## 2019-05-25 NOTE — PROGRESS NOTES
Procedure Component Value Units Date/Time     Potassium, Whole Blood [[id number]] Collected: 05/25/19 1411      Updated: 05/25/19 1413      Potassium, Whole Blood 4.1     Narrative:       CALL  Nils Mejia RN, 05/25/2019 14:13, by Rico Fall     Blood Gas, Arterial [[id number]] (Abnormal) Collected: 05/25/19 1411     Specimen: Blood gases Updated: 05/25/19 1413      pH, Arterial 7.480      pCO2, Arterial 46.0 mmHg       pO2, Arterial 52.0 mmHg       HCO3, Arterial 34.3 mmol/L       Base Excess, Arterial 9.6 mmol/L       Hemoglobin, Art, Extended 12.0 g/dL       O2 Sat, Arterial 87.6 %       Carboxyhgb, Arterial 2.6 %       Methemoglobin, Arterial 1.2 %       O2 Content, Arterial 14.8 mL/dL       O2 Therapy Unknown     Narrative:       CALL  Nils Mejia RN, 05/25/2019 14:13, by Rico Fall     50% VMASK RR16 RR POS JAY JAY

## 2019-05-25 NOTE — PROGRESS NOTES
bipap applied per previous settings. Spoke to dr Chelsy Peralta regarding abgs, and he would like bipap to be on for now.

## 2019-05-25 NOTE — PROGRESS NOTES
[facility] Progress Note    Patient:  Lisa Reagan  YOB: 1955  Date of Service: 5/25/2019  MRN: [id number]   Acct: [de-identified]   Primary Care Physician: No primary care provider on file. Advance Directive: Full Code  Admit Date: 5/17/2019       Hospital Day: 8      CHIEF COMPLAINT:  Shortness of breath and respiratory failure      5/25/2019 10:22 AM  Subjective / Interval History:   Doing well. Shortness of breath improved. No acute overnight events reported. Patient endorses some increased work of breathing. Cumulative Hospital History:    As per Initial admission HPI 5/17/2019, quoted below; \"The patient is a 61 y.o. female who presented to ICU as a transfer from [place], [place] due to ongoing respiratory failure without improvement. The records presented include an admission note, several pages of orders and laboratory results, radiology results. Per the records it is noted that the patient presented with dyspnea and syncope. She was admitted to ICU and treated with BiPAP and high FIO2. Diagnoses included pneumonitis versus pneumonia LLL, Afib anticoagulated, COPD CRF and syncope.      She has history of PE, negative CT PE at referring facility and is fully anticoagulated. Per records had an IVCF that was removed about 2 years ago.      She was transferred due to failing to wean of BiPAP. During the flight her respiratory status declines and became more hypoxemic. She was intubated on transit.     On arrival she is paralyzed and sedated. Blood pressure is normal. Chest has scattered rales with poor air entry. AC mode vent initially failed to recruit airway and she required bagging by RT before second try. \"      Extubated - 05/23/2019  Transferred from ICU - 05/24/2019          Review of Systems:   Review of Systems  ROS: 14 point review of systems is negative except as specifically addressed above.     DIET DENTAL SOFT; No Added Salt (3-4 GM)  Dietary Nutrition Supplements: Low Calorie High Protein Supplement  Dietary Nutrition Supplements: Standard High Calorie Oral Supplement    Intake/Output Summary (Last 24 hours) at 5/25/2019 1022  Last data filed at 5/25/2019 0630  Gross per 24 hour   Intake 1300 ml   Output 1525 ml   Net -225 ml       Medications:  Current Facility-Administered Medications   Medication Dose Route Frequency Provider Last Rate Last Dose    FLUoxetine (PROZAC) capsule 20 mg  20 mg Oral Daily Yudy Crumble, DO   20 mg at 05/25/19 7727    clonazePAM (KLONOPIN) tablet 0.5 mg  0.5 mg Oral Nightly Yudy Crumble, DO   0.5 mg at 05/24/19 2030    methylPREDNISolone sodium (SOLU-MEDROL) injection 10 mg  10 mg Intravenous Q12H Yudy Crumble, DO   10 mg at 05/25/19 0830    bisacodyl (DULCOLAX) suppository 10 mg  10 mg Rectal Daily PRN Yudy Crumble, DO   10 mg at 05/23/19 1840    ALPRAZolam Elfida Scarlet) tablet 0.25 mg  0.25 mg Oral TID PRN Yudy Crumble, DO        atorvastatin (LIPITOR) tablet 20 mg  20 mg Oral Daily Yudy Crumble, DO   20 mg at 69/97/30 1734    folic acid (FOLVITE) tablet 1 mg  1 mg Oral Daily Yudy Crumble, DO   1 mg at 05/25/19 0830    HYDROcodone-acetaminophen (NORCO)  MG per tablet 1 tablet  1 tablet Oral Q6H PRN Yudy Crumble, DO   1 tablet at 05/25/19 0830    metoprolol tartrate (LOPRESSOR) tablet 25 mg  25 mg Oral BID Yudy Crumble, DO   Stopped at 05/25/19 5687    nystatin (MYCOSTATIN) 079197 UNIT/ML suspension 500,000 Units  5 mL Oral 4 times per day Yudy Crumble, DO   500,000 Units at 05/25/19 0533    midazolam (VERSED) injection 1 mg  1 mg Intravenous Q2H PRN Yudy Crumble, DO   1 mg at 05/19/19 2015    rOPINIRole (REQUIP) tablet 1 mg  1 mg Per NG tube TID Yudy Crumble, DO   1 mg at 05/25/19 0830    albuterol (PROVENTIL) nebulizer solution 2.5 mg  2.5 mg Nebulization Q4H PRN Yudy Crumble, DO        ipratropium-albuterol (DUONEB) nebulizer solution 1 ampule 1 ampule Inhalation Q4H Ilean Failing, DO   1 ampule at 05/25/19 2757    sodium chloride flush 0.9 % injection 10 mL  10 mL Intravenous 2 times per day Ilean Failing, DO   10 mL at 05/25/19 1065    sodium chloride flush 0.9 % injection 10 mL  10 mL Intravenous PRN Ilean Failing, DO   10 mL at 05/24/19 2116    acetaminophen (TYLENOL) suppository 650 mg  650 mg Rectal Q4H PRN Ilean Failing, DO        enoxaparin (LOVENOX) injection 80 mg  1 mg/kg Subcutaneous BID Ilean Failing, DO   80 mg at 05/25/19 2260    morphine (PF) injection 2 mg  2 mg Intravenous Q1H PRN Ilean Failing, DO   2 mg at 05/21/19 1230           FLUoxetine  20 mg Oral Daily    clonazePAM  0.5 mg Oral Nightly    methylPREDNISolone  10 mg Intravenous Q12H    atorvastatin  20 mg Oral Daily    folic acid  1 mg Oral Daily    metoprolol tartrate  25 mg Oral BID    nystatin  5 mL Oral 4 times per day    rOPINIRole  1 mg Per NG tube TID    ipratropium-albuterol  1 ampule Inhalation Q4H    sodium chloride flush  10 mL Intravenous 2 times per day    enoxaparin  1 mg/kg Subcutaneous BID     bisacodyl, ALPRAZolam, HYDROcodone-acetaminophen, midazolam, albuterol, sodium chloride flush, acetaminophen, morphine  DIET DENTAL SOFT; No Added Salt (3-4 GM)  Dietary Nutrition Supplements: Low Calorie High Protein Supplement  Dietary Nutrition Supplements: Standard High Calorie Oral Supplement       Labs:     Recent Labs     05/23/19  0400 05/24/19  0220 05/25/19  0717   WBC 11.3* 13.2* 12.2*   RBC 3.69* 4.00* 4.06*   HGB 11.0* 11.6* 11.8*   HCT 35.1* 37.4 38.0   MCV 95.1 93.5 93.6   MCH 29.8 29.0 29.1   MCHC 31.3* 31.0* 31.1*    190 207     Recent Labs     05/23/19  0400  05/23/19  0927 05/24/19  0220 05/25/19  0716     --   --  144 144   K 4.5   < > 4.0 4.2 4.2   ANIONGAP 4*  --   --  9 11     --   --  103 102   CO2 33*  --   --  32* 31*   BUN 23  --   --  20 20   CREATININE 0.5  --   --  0.7 0.7 GLUCOSE 112*  --   --  99 84   CALCIUM 7.9*  --   --  8.1* 8.5*    < > = values in this interval not displayed. No results for input(s): MG, PHOS in the last 72 hours. No results for input(s): AST, ALT, ALB, BILITOT, ALKPHOS, ALB in the last 72 hours. HgBA1c:  No components found for: HGBA1C  FLP:  No results found for: TRIG, HDL, LDLCALC, LDLDIRECT, LABVLDL  TSH:  No results found for: TSH  Troponin T: No results for input(s): TROPONINI in the last 72 hours. Pro-BNP: No results for input(s): BNP in the last 72 hours. INR: No results for input(s): INR in the last 72 hours. ABGs:   Lab Results   Component Value Date    PHART 7.470 05/23/2019    PO2ART 56.0 05/23/2019    TLA3RGJ 51.0 05/23/2019     UA:No results for input(s): NITRITE, COLORU, PHUR, LABCAST, WBCUA, RBCUA, MUCUS, TRICHOMONAS, YEAST, BACTERIA, CLARITYU, SPECGRAV, LEUKOCYTESUR, UROBILINOGEN, BILIRUBINUR, BLOODU, GLUCOSEU, AMORPHOUS in the last 72 hours. Invalid input(s): Diego Dye      RAD:   Xr Chest Portable    Result Date: 5/23/2019  EXAMINATION:  XR CHEST PORTABLE  5/23/2019 7:21 AM HISTORY: Intubated. Respiratory failure. COMPARISON: 5/22/2019. FINDINGS:  There is no significant change in bilateral infiltrates. No significant pleural effusion is seen. There is mild cardiomegaly. There have been no tube or line changes. Bilateral infiltrates with no significant change. Probable pulmonary edema. Pneumonia less likely. Signed by Dr Taran Addison on 5/23/2019 7:21 AM    Xr Chest Portable    Result Date: 5/22/2019  XR CHEST PORTABLE 5/22/2019 7:30 AM History: Respiratory failure. Ventilator. Portable chest x-ray compared with yesterday. The endotracheal tube remains in good position with the tip 5.6 cm above the oswaldo. A nasogastric tube remains in good position. A left chest wall port remains in good position. There is patchy bilateral infiltrate which is greatest at the left lower lobe.  Overall infiltrate is stable or minimally improved. There is no worsening lung disease. No pneumothorax. Heart size is normal and unchanged. 1. Patchy bilateral infiltrate is stable or slightly improved. No worsening. 2. Stable tubes. Signed by Dr Deshaun Trevizo on 5/22/2019 7:31 AM    Xr Chest Portable    Result Date: 5/21/2019  EXAMINATION:  XR CHEST PORTABLE  5/21/2019 7:23 AM HISTORY: Respiratory failure. On ventilator. COMPARISON: 5/20/2019. FINDINGS:  There is bilateral interstitial appearing infiltrate that may be minimally improved. There have been no tube or line changes. There is a port catheter on the left. Heart size is upper limits of normal. There is minimal blunting of the costophrenic angles. 1. Bilateral interstitial appearing infiltrate may be minimally improved. This likely represents pulmonary edema. Pneumonia less likely. 2. Mild blunting of the costophrenic angles. Signed by Dr Dana Holliday on 5/21/2019 7:25 AM    Xr Chest Portable    Result Date: 5/20/2019  EXAMINATION: XR CHEST PORTABLE 5/20/2019 6:59 AM HISTORY: XR CHEST PORTABLE 5/20/2019 5:00 AM HISTORY: Respiratory failure COMPARISON: May 19, 2019. FINDINGS: Bilateral interstitial and airspace filling infiltrates are present. There is no change or improvement compared to May 19, 2019. . The cardiac silhouette is upper limits of normal. Endotracheal tube and nasogastric tube are satisfactorily positioned. . The osseous structures and surrounding soft tissues demonstrate no acute abnormality. 1. Persistent bilateral interstitial and airspace filling infiltrates. This is unchanged from May 19. Signed by Dr Regi Mata on 5/20/2019 7:01 AM    Xr Chest Portable    Result Date: 5/19/2019  XR CHEST PORTABLE 5/19/2019 5:00 AM HISTORY: Respiratory failure Comparison: Chest exam dated 5/18/2019 Findings: Endotracheal tube is in good position, projecting 5.9 cm above the oswaldo. Enteric tube is in stable position. Reidentified left chest wall [id number].   Bilateral diffuse airspace opacities are very similar. No worsening area of consolidation or new volume loss. The cardiomediastinal silhouette and pulmonary vascularity are unchanged. No acute osseous or soft tissue abnormality is noted. Impression: 1. No significant interval change from the previous exam.  Signed by Dr Osvaldo Falk on 5/19/2019 7:48 AM    Xr Chest Portable    Result Date: 5/18/2019  XR CHEST PORTABLE 5/18/2019 12:00 PM HISTORY: Repositioned enteric tube Comparison: 5/18/2019 Findings: Repositioned enteric tube with tube appearing in good position. Tip and sidehole are beyond the GE junction. Lines and tubes are otherwise stable. Stable bilateral airspace opacities, most notable in the left base. The cardiomediastinal silhouette and pulmonary vascularity are unchanged. No acute osseous or soft tissue abnormality is noted. Impression: 1. Repositioned enteric tube with tube appearing in good position. Otherwise stable. Signed by Dr Osvaldo Falk on 5/18/2019 1:23 PM    Xr Chest Portable    Result Date: 5/18/2019  XR CHEST PORTABLE 5/18/2019 8:30 AM HISTORY: NG tube insertion Comparison: 5/17/2019 Findings: New enteric tube with tip coursing below the diaphragm. Tip is beyond the field-of-view. Lines and tubes are otherwise in stable position. Reidentified bilateral airspace opacities, most notable being the consolidation in the left lung base. Cardiac mediastinal silhouette and pulmonary vasculature are stable. Impression: 1. New enteric tube with tip coursing below the diaphragm. Tube tip is beyond the lower edge of the field-of-view. 2. Otherwise stable exam. Signed by Dr Osvaldo Falk on 5/18/2019 11:38 AM    Xr Chest Portable    Result Date: 5/17/2019  Examination. XR CHEST PORTABLE History: Tube placement. A frontal portable supine view of the chest is obtained. There is no previous study for comparison.  There is extensive interstitial infiltrate in the lungs bilaterally more pronounced in the left lower lung. In the absence of a previous study for comparison the age of this infiltrate is not certain. An endotracheal tube is seen with distal end 5 cm above trachea bifurcation. This is in optimal position. A left-sided MediPort system is seen introduced through the left internal jugular vein. The distal end is in the distal superior vena cava. No pneumothorax. There is no bony abnormality. The distal end of the endotracheal tube is 5 cm above trachea bifurcation. This is an optimal position. Extensive interstitial infiltrate in the lungs bilaterally more severely in the left lower lung. This may represent acute or chronic interstitial pneumonitis. A MediPort system in place. Above findings are recorded on a digital voice clip in PACS. Signed by Dr Beverly Keenan on 5/17/2019 4:11 PM      Objective:   Vitals: /63   Pulse 72   Temp 98 °F (36.7 °C) (Temporal)   Resp 18   Ht 5' 1.02\" (1.55 m)   Wt 170 lb 4.8 oz (77.2 kg)   SpO2 91%   BMI 32.15 kg/m²   24HR INTAKE/OUTPUT:      Intake/Output Summary (Last 24 hours) at 5/25/2019 1022  Last data filed at 5/25/2019 0630  Gross per 24 hour   Intake 1300 ml   Output 1525 ml   Net -225 ml       Physical Exam  Nursing notes and vital signs reviewed  General appearance: No apparent distress, appears stated age and cooperative. Well developed and well groomed. HEENT: atraumatic, eyes with clear conjunctiva and normal lids, pupils and irises normal, external ears and nose are normal, lips normal. Hearing Intact. Lips with No blisters. Neck: Supple, with full range of motion. No jugular venous distention. Trachea midline. Thyroid no masses noted. No lympadenopathy or bruit.   Lungs: Patient in no acute respiratory distress, however with some increased work of breathing, \"clear to auscultation bilaterally\" without rales, rhonchi or wheezes  Heart: regular rate and rhythm, S1, S2 normal, no murmur, click, rub or gallop  Abdomen: soft, non-tender; non-distended normal bowel sounds no masses, no organomegaly  Musculoskeletal: ROM Intact in B/L Upper and LE. No joint tenderness or Deformity throughout. Extremities: extremities normal, atraumatic, no cyanosis. Trace b/l edema  Neurologic: No focal neurologic deficits, normal sensation, CN: II-XII intact, grossly non-focal.  Skin: Skin color, texture, turgor normal. No rashes or lesions  Psychiatric: Alert and oriented, affect and mood appropriate, thought content appropriate, normal insight. Assessment/plan:         Principal Problem:    Acute on chronic respiratory failure with hypoxia and hypercapnia ([facility])  Active Problems:    Left lower lobe pneumonia (HCC)    Chronic atrial fibrillation (HCC)    Personal history of PE (pulmonary embolism)    COPD (chronic obstructive pulmonary disease) ([facility])    Chronic diastolic congestive heart failure (HCC)    ARDS (adult respiratory distress syndrome) ([facility])    Bacteremia    Palliative care patient    Moderate malnutrition ([facility] [address]  Resolved Problems:    * No resolved hospital problems. *    Acute on chronic hypoxic and hypercarbic respiratory failure  Secondary to to COPD with acute exacerbation, as well as  Left lower lobe pneumonia  · Followed by pulmonology  · Status post extubation - 05/23/2019  · Transferred from the ICU - 05/24/2019  · Completed a seven-day course of broad-spectrum antibiotics  · Continue tapering of systemic steroids  · Continue bronchodilators  · BiPAP as needed  · Oxygen supplementation as needed to keep SPO2 above 90%  · Repeat ABG        Positive blood culture. Staph epididymis and enterococcus - suspect contaminants  Followed by ID, recommend antibiotic discontinued (05/23/2019), patient be observed off antibiotics      Continue management of other chronic medical conditions - see above and orders.             Advance Directive: Full Code    DIET DENTAL SOFT; No Added Salt (3-4 GM)  Dietary Nutrition Supplements: Low Calorie High Protein Supplement  Dietary Nutrition Supplements: Standard High Calorie Oral Supplement     DVT prophylaxis: Enoxaparin    Consults Made:   IP CONSULT TO PULMONOLOGY  IP CONSULT TO PHARMACY  IP CONSULT TO DIETITIAN  PALLIATIVE CARE EVAL  IP CONSULT TO INFECTIOUS DISEASES    Discharge planning: tbd       Electronically signed by   Daisy Posada MD, MPH,   Internal Medicine Hospitalist   5/25/2019 10:22 AM

## 2019-05-25 NOTE — PLAN OF CARE
Problem: Falls - Risk of:  Goal: Will remain free from falls  Description  Will remain free from falls  Outcome: Met This Shift  Goal: Absence of physical injury  Description  Absence of physical injury  Outcome: Met This Shift     Problem: Risk for Impaired Skin Integrity  Goal: Tissue integrity - skin and mucous membranes  Description  Structural intactness and normal physiological function of skin and  mucous membranes.   Outcome: Ongoing     Problem: Nutrition  Goal: Optimal nutrition therapy  Description  Nutrition Problem: Inadequate oral intake  Intervention: Food and/or Nutrient Delivery: Continue current diet, Start ONS  Nutritional Goals: New Goal: Pt will consume 50% or more of meals and supplements with no s/s intolerance           5/24/2019 2314 by Kellen Daugherty LPN  Outcome: Ongoing  5/24/2019 1423 by Thang Serrano RD, LD  Outcome: Ongoing  5/24/2019 1423 by Thang Serrano RD, LD  Outcome: Ongoing     Problem: Pain:  Goal: Pain level will decrease  Description  Pain level will decrease  Outcome: Ongoing  Goal: Control of acute pain  Description  Control of acute pain  Outcome: Ongoing  Goal: Control of chronic pain  Description  Control of chronic pain  Outcome: Ongoing

## 2019-05-25 NOTE — PROGRESS NOTES
12 hour chart check review completed. Electronically signed by Case Avila LPN on 4/99/0729 at 41:75 AM

## 2019-05-25 NOTE — PROGRESS NOTES
Patient is resting in bed asleep at this time; earlier patient's Oxygen levels dropped down in the low 80's and patient was placed on a mask at that time; patient is now on a bipap at bedtime; no complaints at this time; no distress noted; no further needs at this time. Electronically signed by Claudine Barton LPN on 0/50/4985 at 35:34 PM

## 2019-05-26 ENCOUNTER — OUTSIDE FACILITY SERVICE (OUTPATIENT)
Dept: PULMONOLOGY | Facility: CLINIC | Age: 64
End: 2019-05-26

## 2019-05-26 PROCEDURE — 99233 SBSQ HOSP IP/OBS HIGH 50: CPT | Performed by: INTERNAL MEDICINE

## 2019-05-26 NOTE — PROGRESS NOTES
Procedure Component Value Units Date/Time     Blood Gas, Arterial [[id number]] (Abnormal) Collected: 05/26/19 1045     Specimen: Blood gases Updated: 05/26/19 1048      pH, Arterial 7.520      pCO2, Arterial 42.0 mmHg       pO2, Arterial 47.0 mmHg       HCO3, Arterial 34.3 mmol/L       Base Excess, Arterial 10.4 mmol/L       Hemoglobin, Art, Extended 11.7 g/dL       O2 Sat, Arterial 85.4 %       Carboxyhgb, Arterial 2.6 %       Methemoglobin, Arterial 1.4 %       O2 Content, Arterial 14.0 mL/dL       O2 Therapy Unknown     Narrative:       CALL  Nils Blevins RN, 05/26/2019 10:48, by Kash Blancas     Potassium, Whole Blood [[id number]] Collected: 05/26/19 1045      Updated: 05/26/19 1048      Potassium, Whole Blood 4.0     Narrative:       CALL  Nils Blevins RN, 05/26/2019 10:48, by ARTI Aguirre L/M [place] RR[address] RB KIA GOYAL

## 2019-05-26 NOTE — PROGRESS NOTES
[facility]    Patient:  Nella Harp  YOB: 1955  Date of Service: 5/26/2019  MRN: [id number]   Acct: [de-identified]   Primary Care Physician: No primary care provider on file. Advance Directive: Full Code  Admit Date: 5/17/2019       Hospital Day: 9      CHIEF COMPLAINT:  Shortness of breath and respiratory failure      5/26/2019 10:02 AM  Subjective / Interval History:   Shortness of breath markedly improved. No acute overnight event reported. Patient was stable on BiPAP overnight. Denies any acute complaints or distress at this time. 05/25/2019  Doing well. Shortness of breath improved. No acute overnight events reported. Patient endorses some increased work of breathing. Cumulative Hospital History:    As per Initial admission HPI 5/17/2019, quoted below; \"The patient is a 61 y.o. female who presented to ICU as a transfer from [place], [place] due to ongoing respiratory failure without improvement. The records presented include an admission note, several pages of orders and laboratory results, radiology results. Per the records it is noted that the patient presented with dyspnea and syncope. She was admitted to ICU and treated with BiPAP and high FIO2. Diagnoses included pneumonitis versus pneumonia LLL, Afib anticoagulated, COPD CRF and syncope.      She has history of PE, negative CT PE at referring facility and is fully anticoagulated. Per records had an IVCF that was removed about 2 years ago.      She was transferred due to failing to wean of BiPAP. During the flight her respiratory status declines and became more hypoxemic. She was intubated on transit.     On arrival she is paralyzed and sedated. Blood pressure is normal. Chest has scattered rales with poor air entry. AC mode vent initially failed to recruit airway and she required bagging by RT before second try.  \"      Extubated - 05/23/2019  Transferred from ICU - 05/24/2019          Review of Systems: Tricia [place], DO   1 mg at 05/26/19 0141    albuterol (PROVENTIL) nebulizer solution 2.5 mg  2.5 mg Nebulization Q4H PRN Carlos Alberto Anu, DO        ipratropium-albuterol (DUONEB) nebulizer solution 1 ampule  1 ampule Inhalation Q4H Carlos Alberto Anu, DO   1 ampule at 05/26/19 9288    sodium chloride flush 0.9 % injection 10 mL  10 mL Intravenous 2 times per day Carlos Alberto Anu, DO   10 mL at 05/26/19 3669    sodium chloride flush 0.9 % injection 10 mL  10 mL Intravenous PRN Carlos Alberto Anu, DO   10 mL at 05/24/19 2116    acetaminophen (TYLENOL) suppository 650 mg  650 mg Rectal Q4H PRN Carlos Alberto Anu, DO        enoxaparin (LOVENOX) injection 80 mg  1 mg/kg Subcutaneous BID Carlos Alberto Anu, DO   80 mg at 05/26/19 2043    morphine (PF) injection 2 mg  2 mg Intravenous Q1H PRN Carlos Alberto Anu, DO   2 mg at 05/21/19 1230           FLUoxetine  20 mg Oral Daily    clonazePAM  0.5 mg Oral Nightly    methylPREDNISolone  10 mg Intravenous Q12H    atorvastatin  20 mg Oral Daily    folic acid  1 mg Oral Daily    metoprolol tartrate  25 mg Oral BID    nystatin  5 mL Oral 4 times per day    rOPINIRole  1 mg Per NG tube TID    ipratropium-albuterol  1 ampule Inhalation Q4H    sodium chloride flush  10 mL Intravenous 2 times per day    enoxaparin  1 mg/kg Subcutaneous BID     bisacodyl, ALPRAZolam, HYDROcodone-acetaminophen, midazolam, albuterol, sodium chloride flush, acetaminophen, morphine  DIET DENTAL SOFT; No Added Salt (3-4 GM)  Dietary Nutrition Supplements: Low Calorie High Protein Supplement  Dietary Nutrition Supplements: Standard High Calorie Oral Supplement       Labs:     Recent Labs     05/24/19  0220 05/25/19  0717 05/26/19  0135   WBC 13.2* 12.2* 11.2*   RBC 4.00* 4.06* 3.61*   HGB 11.6* 11.8* 10.6*   HCT 37.4 38.0 33.9*   MCV 93.5 93.6 93.9   MCH 29.0 29.1 29.4   MCHC 31.0* 31.1* 31.3*    207 179     Recent Labs     05/24/19  0220 05/25/19  0716 05/25/19  1411 05/26/19  0135    144  --  142   K 4.2 4.2 4.1 4.5   ANIONGAP 9 11  --  10    102  --  102   CO2 32* 31*  --  30*   BUN 20 20  --  17   CREATININE 0.7 0.7  --  0.5   GLUCOSE 99 84  --  118*   CALCIUM 8.1* 8.5*  --  8.2*     No results for input(s): MG, PHOS in the last 72 hours. No results for input(s): AST, ALT, ALB, BILITOT, ALKPHOS, ALB in the last 72 hours. HgBA1c:  No components found for: HGBA1C  FLP:  No results found for: TRIG, HDL, LDLCALC, LDLDIRECT, LABVLDL  TSH:  No results found for: TSH  Troponin T: No results for input(s): TROPONINI in the last 72 hours. Pro-BNP: No results for input(s): BNP in the last 72 hours. INR: No results for input(s): INR in the last 72 hours. ABGs:   Lab Results   Component Value Date    PHART 7.480 05/25/2019    PO2ART 52.0 05/25/2019    UPO3DHD 46.0 05/25/2019     UA:No results for input(s): NITRITE, COLORU, PHUR, LABCAST, WBCUA, RBCUA, MUCUS, TRICHOMONAS, YEAST, BACTERIA, CLARITYU, SPECGRAV, LEUKOCYTESUR, UROBILINOGEN, BILIRUBINUR, BLOODU, GLUCOSEU, AMORPHOUS in the last 72 hours. Invalid input(s): Yesenia Lopez      RAD:   Xr Chest Portable    Result Date: 5/23/2019  EXAMINATION:  XR CHEST PORTABLE  5/23/2019 7:21 AM HISTORY: Intubated. Respiratory failure. COMPARISON: 5/22/2019. FINDINGS:  There is no significant change in bilateral infiltrates. No significant pleural effusion is seen. There is mild cardiomegaly. There have been no tube or line changes. Bilateral infiltrates with no significant change. Probable pulmonary edema. Pneumonia less likely. Signed by Dr Fern Smith on 5/23/2019 7:21 AM    Xr Chest Portable    Result Date: 5/22/2019  XR CHEST PORTABLE 5/22/2019 7:30 AM History: Respiratory failure. Ventilator. Portable chest x-ray compared with yesterday. The endotracheal tube remains in good position with the tip 5.6 cm above the oswaldo. A nasogastric tube remains in good position. A left chest wall port remains in good position.  There is patchy bilateral infiltrate which is greatest at the left lower lobe. Overall infiltrate is stable or minimally improved. There is no worsening lung disease. No pneumothorax. Heart size is normal and unchanged. 1. Patchy bilateral infiltrate is stable or slightly improved. No worsening. 2. Stable tubes. Signed by Dr Kaleb Bhakta on 5/22/2019 7:31 AM    Xr Chest Portable    Result Date: 5/21/2019  EXAMINATION:  XR CHEST PORTABLE  5/21/2019 7:23 AM HISTORY: Respiratory failure. On ventilator. COMPARISON: 5/20/2019. FINDINGS:  There is bilateral interstitial appearing infiltrate that may be minimally improved. There have been no tube or line changes. There is a port catheter on the left. Heart size is upper limits of normal. There is minimal blunting of the costophrenic angles. 1. Bilateral interstitial appearing infiltrate may be minimally improved. This likely represents pulmonary edema. Pneumonia less likely. 2. Mild blunting of the costophrenic angles. Signed by Dr Darren Devlin on 5/21/2019 7:25 AM    Xr Chest Portable    Result Date: 5/20/2019  EXAMINATION: XR CHEST PORTABLE 5/20/2019 6:59 AM HISTORY: XR CHEST PORTABLE 5/20/2019 5:00 AM HISTORY: Respiratory failure COMPARISON: May 19, 2019. FINDINGS: Bilateral interstitial and airspace filling infiltrates are present. There is no change or improvement compared to May 19, 2019. . The cardiac silhouette is upper limits of normal. Endotracheal tube and nasogastric tube are satisfactorily positioned. . The osseous structures and surrounding soft tissues demonstrate no acute abnormality. 1. Persistent bilateral interstitial and airspace filling infiltrates. This is unchanged from May 19.  Signed by Dr Nneka Artis on 5/20/2019 7:01 AM    Xr Chest Portable    Result Date: 5/19/2019  XR CHEST PORTABLE 5/19/2019 5:00 AM HISTORY: Respiratory failure Comparison: Chest exam dated 5/18/2019 Findings: Endotracheal tube is in good position, projecting 5.9 cm above the oswaldo. Enteric tube is in stable position. Reidentified left chest wall [id number]. Bilateral diffuse airspace opacities are very similar. No worsening area of consolidation or new volume loss. The cardiomediastinal silhouette and pulmonary vascularity are unchanged. No acute osseous or soft tissue abnormality is noted. Impression: 1. No significant interval change from the previous exam.  Signed by Dr Moises Landau on 5/19/2019 7:48 AM    Xr Chest Portable    Result Date: 5/18/2019  XR CHEST PORTABLE 5/18/2019 12:00 PM HISTORY: Repositioned enteric tube Comparison: 5/18/2019 Findings: Repositioned enteric tube with tube appearing in good position. Tip and sidehole are beyond the GE junction. Lines and tubes are otherwise stable. Stable bilateral airspace opacities, most notable in the left base. The cardiomediastinal silhouette and pulmonary vascularity are unchanged. No acute osseous or soft tissue abnormality is noted. Impression: 1. Repositioned enteric tube with tube appearing in good position. Otherwise stable. Signed by Dr Moises Landau on 5/18/2019 1:23 PM    Xr Chest Portable    Result Date: 5/18/2019  XR CHEST PORTABLE 5/18/2019 8:30 AM HISTORY: NG tube insertion Comparison: 5/17/2019 Findings: New enteric tube with tip coursing below the diaphragm. Tip is beyond the field-of-view. Lines and tubes are otherwise in stable position. Reidentified bilateral airspace opacities, most notable being the consolidation in the left lung base. Cardiac mediastinal silhouette and pulmonary vasculature are stable. Impression: 1. New enteric tube with tip coursing below the diaphragm. Tube tip is beyond the lower edge of the field-of-view. 2. Otherwise stable exam. Signed by Dr Moises Landau on 5/18/2019 11:38 AM    Xr Chest Portable    Result Date: 5/17/2019  Examination. XR CHEST PORTABLE History: Tube placement. A frontal portable supine view of the chest is obtained.  There is no previous study for comparison. There is extensive interstitial infiltrate in the lungs bilaterally more pronounced in the left lower lung. In the absence of a previous study for comparison the age of this infiltrate is not certain. An endotracheal tube is seen with distal end 5 cm above trachea bifurcation. This is in optimal position. A left-sided MediPort system is seen introduced through the left internal jugular vein. The distal end is in the distal superior vena cava. No pneumothorax. There is no bony abnormality. The distal end of the endotracheal tube is 5 cm above trachea bifurcation. This is an optimal position. Extensive interstitial infiltrate in the lungs bilaterally more severely in the left lower lung. This may represent acute or chronic interstitial pneumonitis. A MediPort system in place. Above findings are recorded on a digital voice clip in PACS. Signed by Dr Edward Celis on 5/17/2019 4:11 PM      Objective:   Vitals: BP (!) 89/56 Comment: reported to Marcie Jordan RN  Pulse 90   Temp 96.4 °F (35.8 °C) (Temporal)   Resp 18   Ht 5' 1.02\" (1.55 m)   Wt 169 lb 6.4 oz (76.8 kg)   SpO2 90%   BMI 31.98 kg/m²   24HR INTAKE/OUTPUT:      Intake/Output Summary (Last 24 hours) at 5/26/2019 1002  Last data filed at 5/26/2019 2367  Gross per 24 hour   Intake 805 ml   Output 2100 ml   Net -1295 ml       Physical Exam  Nursing notes and vital signs reviewed  General appearance: No apparent distress, appears stated age and cooperative. Well developed and well groomed. HEENT: atraumatic, eyes with clear conjunctiva and normal lids, pupils and irises normal, external ears and nose are normal, lips normal. Hearing Intact. Lips with No blisters. Neck: Supple, with full range of motion. No jugular venous distention. Trachea midline. Thyroid no masses noted. No lympadenopathy or bruit.   Lungs: Patient in no acute respiratory distress, no increased work of breathing, \"clear to auscultation bilaterally\" without rales, rhonchi or wheezes  Heart: regular rate and rhythm, S1, S2 normal, no murmur, click, rub or gallop  Abdomen: soft, non-tender; non-distended normal bowel sounds no masses, no organomegaly  Musculoskeletal: ROM Intact in B/L Upper and LE. No joint tenderness or Deformity throughout. Extremities: extremities normal, atraumatic, no cyanosis. Trace b/l edema  Neurologic: No focal neurologic deficits, normal sensation, CN: II-XII intact, grossly non-focal.  Skin: Skin color, texture, turgor normal. No rashes or lesions  Psychiatric: Alert and oriented, affect and mood appropriate, thought content appropriate, normal insight. Assessment/plan:         Principal Problem:    Acute on chronic respiratory failure with hypoxia and hypercapnia ([facility])  Active Problems:    Left lower lobe pneumonia (HCC)    Chronic atrial fibrillation ([facility])    Personal history of PE (pulmonary embolism)    COPD (chronic obstructive pulmonary disease) ([facility])    Chronic diastolic congestive heart failure (HCC)    ARDS (adult respiratory distress syndrome) ([facility])    Bacteremia    Palliative care patient    Moderate malnutrition ([facility] [address]  Resolved Problems:    * No resolved hospital problems. *    Acute on chronic hypoxic and hypercarbic respiratory failure  Secondary to to COPD with acute exacerbation, as well as  Left lower lobe pneumonia  · Followed by pulmonology  · Status post extubation - 05/23/2019  · Transferred from the ICU - 05/24/2019  · Completed a seven-day course of broad-spectrum antibiotics  · Initially attempted to taper of systemic steroids  · , However, given systemically increase oxygen requirement, willl resume methylprednisolone 40 mg IV every 6 hours for now  · Continue bronchodilators  · Oxygen supplementation as needed to keep SPO2 above 90%  · Patient remains hypoxemic with a PaO2 of 52 mmHg on repeat ABG (05/25/2019)  · Repeat ABG  · Continue BiPAP  · Consider heated/humidified high flow oxygen    · Chest x-ray - 05/23/2019. Impression:   · Bilateral infiltrates with no significant change. · Probable pulmonary edema. · Pneumonia less likely. · Repeat chest x-ray this a.m. · Check pro BNP level  · Get a 2-D echocardiogram      Positive blood culture. Staph epididymis and enterococcus - suspect contaminants  Followed by ID, recommend antibiotic discontinued (05/23/2019), patient be observed off antibiotics      Continue management of other chronic medical conditions - see above and orders.             Advance Directive: Full Code    DIET DENTAL SOFT; No Added Salt (3-4 GM)  Dietary Nutrition Supplements: Low Calorie High Protein Supplement  Dietary Nutrition Supplements: Standard High Calorie Oral Supplement     DVT prophylaxis: Enoxaparin    Consults Made:   IP CONSULT TO PULMONOLOGY  IP CONSULT TO PHARMACY  IP CONSULT TO DIETITIAN  PALLIATIVE CARE EVAL  IP CONSULT TO INFECTIOUS DISEASES    Discharge planning: tbd       Electronically signed by   Jennifer Escobar MD, MPH,   Internal Medicine Hospitalist   5/26/2019 10:02 AM

## 2019-05-26 NOTE — PLAN OF CARE
Problem: Risk for Impaired Skin Integrity  Goal: Tissue integrity - skin and mucous membranes  Description  Structural intactness and normal physiological function of skin and  mucous membranes.   Outcome: Ongoing     Problem: Falls - Risk of:  Goal: Will remain free from falls  Description  Will remain free from falls  Outcome: Ongoing  Goal: Absence of physical injury  Description  Absence of physical injury  Outcome: Ongoing     Problem: Nutrition  Goal: Optimal nutrition therapy  Description  Nutrition Problem: Inadequate oral intake  Intervention: Food and/or Nutrient Delivery: Continue current diet, Start ONS  Nutritional Goals: New Goal: Pt will consume 50% or more of meals and supplements with no s/s intolerance           Outcome: Ongoing     Problem: Pain:  Goal: Pain level will decrease  Description  Pain level will decrease  Outcome: Ongoing  Goal: Control of acute pain  Description  Control of acute pain  Outcome: Ongoing  Goal: Control of chronic pain  Description  Control of chronic pain  Outcome: Ongoing

## 2019-05-26 NOTE — PROGRESS NOTES
Pt received medication and is resting. No complaints at this time. Will continue to monitor.  Electronically signed by Abraham Huerta RN on 5/25/2019 at 8:12 PM

## 2019-05-26 NOTE — PROGRESS NOTES
Blood culture drawn from left medi port per sterile technique by rn. Lab drawn second set of blood cultures from a peripheral site.

## 2019-05-27 ENCOUNTER — OUTSIDE FACILITY SERVICE (OUTPATIENT)
Dept: PULMONOLOGY | Facility: CLINIC | Age: 64
End: 2019-05-27

## 2019-05-27 PROCEDURE — 99233 SBSQ HOSP IP/OBS HIGH 50: CPT | Performed by: INTERNAL MEDICINE

## 2019-05-27 NOTE — PROGRESS NOTES
Pulmonary and Critical Care Progress Note [facility]    Patient: Eduard Mackey  1955   MR# [id number]   Acct# [de-identified]  05/26/19   7:10 PM  Referring Provider: German Heard DO    Chief Complaint: Hypoxemic respiratory failure. Interval history: I was asked to reevaluate this patient from a pulmonary standpoint because of worsening hypoxemia. She initially was admitted here to [address] [place] [address] in transfer from [facility]  because of respiratory failure. She appears a been on BiPAP and in route developed worsening of her respiration status and required intubation. She was transferred via air. She has a past history of pulmonary emboli. She did have an IVC filter placed in the past but it subsequently was removed about 2 years ago. She was able be weaned and extubated from the ventilator here and transferred out of the ICU. It is felt she probably had a pneumonia although her x-rays are suggestive possibly some chronic changes. [facility] her chest CT to document her pulmonary emboli in the past she is also noted to have some bronchiectatic changes and some interstitial thickening with some hazy groundglass changes. Again she was weaned and extubated on this admission and had improvement and was transferred out of the ICU but subsequently has had some worsening of her hypoxemia. She did have a chest CT performed that she has severe bronchiectatic changes. Lately the bases. She has fibrotic changes as well as some honeycombing. There clearly is significant basilar involvement but there also is clearly significant upper lobe involvement as well. There also is almost a mosaic pattern to the interstitial changes with areas of possible air trapping located more anteriorly.   The honeycombing, fibrosis, and traction bronchiectasis particularly with  lower lobe components certainly would be consistent with a UIP picture but the and oriented to person, place, and time. Skin: Skin is warm and dry. No rash noted. Psychiatric: She has a normal mood and affect. Nursing note and vitals reviewed. Recent Labs     05/24/19  0220 05/25/19  0717 05/26/19  0135   WBC 13.2* 12.2* 11.2*   RBC 4.00* 4.06* 3.61*   HGB 11.6* 11.8* 10.6*   HCT 37.4 38.0 33.9*    207 179   MCV 93.5 93.6 93.9   MCH 29.0 29.1 29.4   MCHC 31.0* 31.1* 31.3*   RDW 15.0* 15.1* 15.1*      Recent Labs     05/24/19  0220 05/25/19  0716 05/25/19  1411 05/26/19  0135 05/26/19  1045    144  --  142  --    K 4.2 4.2 4.1 4.5 4.0    102  --  102  --    CO2 32* 31*  --  30*  --    BUN 20 20  --  17  --    CREATININE 0.7 0.7  --  0.5  --    CALCIUM 8.1* 8.5*  --  8.2*  --    GLUCOSE 99 84  --  118*  --    PHART  --   --  7.480*  --  7.520*   YBY1HAV  --   --  46.0*  --  42.0   PO2ART  --   --  52.0*  --  47.0*   DBD0ICT  --   --  34.3*  --  34.3*   P3KEVURD  --   --  87.6*  --  85.4*   BEART  --   --  9.6*  --  10.4*      No results for input(s): BC, LABGRAM, CULTRESP, BFCX in the last 72 hours. Radiograph:   1. Diffuse pulmonary fibrosis with basal predominance. There is   traction bronchiectasis and distortion. There is a honeycomb   appearance. The pattern is consistent with usual interstitial   pneumonitis. 2. The pulmonary arteries are fairly markedly dilated consistent with   pulmonary hypertension. There is cardiomegaly with enlargement of all   chambers. Low-density of the blood within the chambers can be seen in   patients with anemia. 3. Small mediastinal lymph nodes, likely reactive. 4. Degenerative changes of the spine.    Signed by Dr Cielo Us on 5/26/2019 1:33 PM       My radiograph interpretation: She has fibrotic changes with 7 basilar involvement but significant upper lobe involvement as well with traction bronchiectasis which is clearly worse in the bases and also some areas of honeycombing which is located both at the bases but also in the upper lobes. There are also areas of relative sparing was somewhat hyperlucent lung fields suggestive some possible air trapping. She also has dilatation of the pulmonary arteries and enlargement of all cardiac chambers. Pulmonary Assessment:    1. Acute superimposed on chronic respiratory failure with hypoxemia. She certainly may have had an episode of acute pneumonia but she clearly has underlying significant underlying chronic lung disease with Pulmicort fibrosis and bronchiectasis. 2. Suspect pulmonary hypertension. Recommend:   · She received broad-spectrum antibiotics during this admission and at present I suspect her current issues not ear infection rather just severe lying interstitial lung disease and possibly postponing hypertension. I'm going to get some serologic studies and I would certainly continue her IV steroids along with high flow oxygen. Muscular order 2-D echocardiogram. If she has either IPF or some atypical form of IPF and develops worsening respiratory failure and requires reintubation her prognosis is extremely poor regarding eventual weaning from the ventilator and is quite possible her initial problems prompting her transfer and admission to [facility] ([facility]) may have been related to an exacerbation of pulmonary fibrosis.     Electronically signed by Jasmin Wilhelm on 5/26/2019 at 7:10 PM

## 2019-05-27 NOTE — PROGRESS NOTES
Pulmonary and Critical Care Progress Note [address] [facility]    Patient: Marcella Way  1955   MR# [id number]   Acct# [[id number]]  05/27/19   10:44 AM  Referring Provider: Abby Herrera DO    Chief Complaint: Hypoxemic respiratory failure. Interval history: The patient appears less dyspneic today. Her 2-D echocardiogram is noted. She denies have some component of pulmonary hypertension. She also has diastolic dysfunction. methylPREDNISolone 40 mg Intravenous Q6H   FLUoxetine 20 mg Oral Daily   clonazePAM 0.5 mg Oral Nightly   atorvastatin 20 mg Oral Daily   folic acid 1 mg Oral Daily   metoprolol tartrate 25 mg Oral BID   nystatin 5 mL Oral 4 times per day   rOPINIRole 1 mg Per NG tube TID   ipratropium-albuterol 1 ampule Inhalation Q4H   sodium chloride flush 10 mL Intravenous 2 times per day   enoxaparin 1 mg/kg Subcutaneous BID       Review of Systems:   Review of Systems   Constitutional: Positive for fatigue. HENT: Negative. Eyes: Negative. Respiratory: Positive for shortness of breath. Cardiovascular: Positive for leg swelling. Gastrointestinal: Negative. Genitourinary: Negative. Musculoskeletal: Negative. Skin: Negative. Neurological: Positive for weakness. Psychiatric/Behavioral: Negative. Physical Exam:  Blood pressure (!) 99/58, pulse 68, temperature 97.5 °F (36.4 °C), resp. rate 20, height 5' 1.02\" (1.55 m), weight 176 lb 6.4 oz (80 kg), SpO2 100 %. Intake/Output Summary (Last 24 hours) at 5/27/2019 1044  Last data filed at 5/27/2019 0845  Gross per 24 hour   Intake 2296 ml   Output 1275 ml   Net 1021 ml     Physical Exam   Constitutional: She is oriented to person, place, and time. She is a chronically ill-appearing white female. She appears less dyspneic today. HENT:   Head: Normocephalic and atraumatic. High flow nasal cannula is in place. Eyes: Pupils are equal, round, and reactive to light. EOM are normal.   Neck: Normal range of motion.  Neck supple. Cardiovascular: Normal rate, regular rhythm and normal heart sounds. Pulmonary/Chest:   Bilateral crackles are present. Abdominal: Soft. Musculoskeletal: She exhibits edema. She has 2+ to 3+ edema of the calves. Neurological: She is alert and oriented to person, place, and time. Skin: Skin is warm and dry. No rash noted. Psychiatric: She has a normal mood and affect. Nursing note and vitals reviewed. Recent Labs     05/25/19  0717 05/26/19  0135 05/27/19  0125   WBC 12.2* 11.2* 8.5   RBC 4.06* 3.61* 3.49*   HGB 11.8* 10.6* 10.4*   HCT 38.0 33.9* 32.9*    179 172   MCV 93.6 93.9 94.3   MCH 29.1 29.4 29.8   MCHC 31.1* 31.3* 31.6*   RDW 15.1* 15.1* 15.1*      Recent Labs     05/25/19  0716 05/25/19  1411 05/26/19  0135 05/26/19  1045 05/27/19  0125     --  142  --  144   K 4.2 4.1 4.5 4.0 4.2     --  102  --  104   CO2 31*  --  30*  --  29   BUN 20  --  17  --  16   CREATININE 0.7  --  0.5  --  0.5   CALCIUM 8.5*  --  8.2*  --  8.4*   GLUCOSE 84  --  118*  --  144*   PHART  --  7.480*  --  7.520*  --    KUM7ANY  --  46.0*  --  42.0  --    PO2ART  --  52.0*  --  47.0*  --    [id number]  --  34.3*  --  34.3*  --    H2UUHPKA  --  87.6*  --  85.4*  --    BEART  --  9.6*  --  10.4*  --       No results for input(s): BC, LABGRAM, CULTRESP, BFCX in the last 72 hours. Radiograph: None today. My radiograph interpretation: no new chest x-ray. Pulmonary Assessment:    1. Acute superimposed on chronic respiratory failure with hypoxemia. 2. Interstitial lung disease with poorly fibrosis and bronchiectasis. This is not a classic UIP picture but it could represent UIP and associated IPF. 3. Pulmonary hypertension by echocardiogram.  This certainly could relate to chronic hypoxemia associated with interstitial lung disease and also could relate to the diastolic dysfunction also noted on her echocardiogram.  4.  Diastolic dysfunction. Recommend:    The treatment for exacerbation of

## 2019-05-27 NOTE — PROGRESS NOTES
Pt received medication and is resting. No complaints at this time. Will continue to monitor.  Electronically signed by Shaneka Huff RN on 5/26/2019 at 8:28 PM

## 2019-05-27 NOTE — PROGRESS NOTES
Hospitalist Progress Note  5/27/2019 1:46 PM  Subjective:   Admit Date: 5/17/2019  PCP: No primary care provider on file. Chief Complaint: shortness of breath    Subjective: Patient is denying new complaints. Became increasingly short of breath just prior to examination when she got up to the bedside commode, requiring 100% FiO2 via high-flow oxygen system. Workup from pulmonology still partially pending. History is otherwise unchanged. Cumulative Hospital History:   5-27: PT/OT consults. Pulmonology workup still pending. ROS: 14 point review of systems is negative except as specifically addressed above.     DIET DENTAL SOFT; No Added Salt (3-4 GM)  Dietary Nutrition Supplements: Low Calorie High Protein Supplement  Dietary Nutrition Supplements: Standard High Calorie Oral Supplement    Intake/Output Summary (Last 24 hours) at 5/27/2019 1346  Last data filed at 5/27/2019 0845  Gross per 24 hour   Intake 1816 ml   Output 1275 ml   Net 541 ml     Medications:  Current Facility-Administered Medications   Medication Dose Route Frequency Provider Last Rate Last Dose    methylPREDNISolone sodium (SOLU-MEDROL) injection 40 mg  40 mg Intravenous Q6H Suzette Mart MD   40 mg at 05/27/19 1135    FLUoxetine (PROZAC) capsule 20 mg  20 mg Oral Daily Parth Shines, DO   20 mg at 05/27/19 7709    clonazePAM (KLONOPIN) tablet 0.5 mg  0.5 mg Oral Nightly Parth Shines, DO   0.5 mg at 05/26/19 2029    bisacodyl (DULCOLAX) suppository 10 mg  10 mg Rectal Daily PRN Parth Shines, DO   10 mg at 05/23/19 1840    ALPRAZolam Elfida Scarlet) tablet 0.25 mg  0.25 mg Oral TID PRN [place] Shines, DO   0.25 mg at 05/26/19 2154    atorvastatin (LIPITOR) tablet 20 mg  20 mg Oral Daily Parth Shines, DO   20 mg at 07/81/11 4931    folic acid (FOLVITE) tablet 1 mg  1 mg Oral Daily [place] Shines, DO   1 mg at 05/27/19 0810    HYDROcodone-acetaminophen (NORCO)  MG per tablet 1 tablet  1 tablet Oral Q6H PRN Minerva Cassette, DO   1 tablet at 05/27/19 0813    metoprolol tartrate (LOPRESSOR) tablet 25 mg  25 mg Oral BID Minerva Cassette, DO   25 mg at 05/27/19 2317    nystatin (MYCOSTATIN) 679281 UNIT/ML suspension 500,000 Units  5 mL Oral 4 times per day Minerva Cassette, DO   500,000 Units at 05/27/19 1220    midazolam (VERSED) injection 1 mg  1 mg Intravenous Q2H PRN Minerva Cassette, DO   1 mg at 05/19/19 2015    rOPINIRole (REQUIP) tablet 1 mg  1 mg Per NG tube TID Minerva Cassette, DO   1 mg at 05/27/19 1220    albuterol (PROVENTIL) nebulizer solution 2.5 mg  2.5 mg Nebulization Q4H PRN Minerva Cassette, DO        ipratropium-albuterol (DUONEB) nebulizer solution 1 ampule  1 ampule Inhalation Q4H Minerva Cassette, DO   1 ampule at 05/27/19 1038    sodium chloride flush 0.9 % injection 10 mL  10 mL Intravenous 2 times per day Minerva Cassette, DO   10 mL at 05/27/19 9922    sodium chloride flush 0.9 % injection 10 mL  10 mL Intravenous PRN Minerva Cassette, DO   10 mL at 05/24/19 2116    acetaminophen (TYLENOL) suppository 650 mg  650 mg Rectal Q4H PRN Minerva Cassette, DO        enoxaparin (LOVENOX) injection 80 mg  1 mg/kg Subcutaneous BID Minerva Cassette, DO   80 mg at 05/27/19 6902    morphine (PF) injection 2 mg  2 mg Intravenous Q1H PRN Minerva Cassette, DO   2 mg at 05/21/19 1230        Labs:     Recent Labs     05/25/19  0717 05/26/19  0135 05/27/19  0125   WBC 12.2* 11.2* 8.5   RBC 4.06* 3.61* 3.49*   HGB 11.8* 10.6* 10.4*   HCT 38.0 33.9* 32.9*   MCV 93.6 93.9 94.3   MCH 29.1 29.4 29.8   MCHC 31.1* 31.3* 31.6*    179 172     Recent Labs     05/25/19  0716  05/26/19  0135 05/26/19  1045 05/27/19  0125     --  142  --  144   K 4.2   < > 4.5 4.0 4.2   ANIONGAP 11  --  10  --  11     --  102  --  104   CO2 31*  --  30*  --  29   BUN 20  --  17  --  16   CREATININE 0.7  --  0.5  --  0.5   GLUCOSE 84  --  118*  --  144*   CALCIUM 8.5*  --  8.2*  --  8.4*    < > = values in this interval not displayed. No results for input(s): MG, PHOS in the last 72 hours. No results for input(s): AST, ALT, ALB, BILITOT, ALKPHOS, ALB in the last 72 hours. ABGs:No results for input(s): PH, PO2, PCO2, HCO3, BE, O2SAT in the last 72 hours. Troponin T: No results for input(s): TROPONINI in the last 72 hours. INR: No results for input(s): INR in the last 72 hours. Lactic Acid: No results for input(s): LACTA in the last 72 hours.     Objective:   Vitals: BP (!) 99/58   Pulse 68   Temp 97.5 °F (36.4 °C)   Resp 20   Ht 5' 1.02\" (1.55 m)   Wt 176 lb 6.4 oz (80 kg)   SpO2 100%   BMI 33.30 kg/m²   24HR INTAKE/OUTPUT:      Intake/Output Summary (Last 24 hours) at 5/27/2019 1346  Last data filed at 5/27/2019 0845  Gross per 24 hour   Intake 1816 ml   Output 1275 ml   Net 541 ml     General appearance: alert and cooperative with exam  HEENT: atraumatic, eyes with clear conjunctiva and normal lids, pupils and irises normal, external ears and nose are normal, lips normal  Neck without masses, lympadenopathy, bruit, thyroid normal  Lungs: no increased work of breathing, diminished breath sounds bilaterally and wheezes with breath sounds largely obscured by high-flow oxygen system  Heart: regular rate and rhythm, S1, S2 normal, no murmur, click, rub or gallop  Abdomen: soft, non-tender; bowel sounds normal; no masses,  no organomegaly  Extremities: edema trace bilaterally, modified Az's negative  Neurologic: no focal neurologic deficits, normal sensation, alert and oriented, affect and mood appropriate  Skin: no rashes, nodules    Assessment and Plan:   Principal Problem:    Acute on chronic respiratory failure with hypoxia and hypercapnia (HCC)  Active Problems:    Left lower lobe pneumonia (HCC)    Chronic atrial fibrillation (HCC)    Personal history of PE (pulmonary embolism)    COPD (chronic obstructive pulmonary disease) (HCC)    Chronic diastolic congestive heart failure (HCC)    ARDS (adult respiratory distress syndrome) (HCC)    Bacteremia    Palliative care patient    Moderate malnutrition ([facility] [address]  Resolved Problems:    * No resolved hospital problems. *    Continue high-flow supplemental oxygen. Continue methylprednisolone 40 mg IV q6h. Continue DuoNebs. Pulmonology on case, appreciate continuing recommendations.     Advance Directive: Full Code    DVT prophylaxis: enoxaparin    Discharge planning: MYNOR Valdez DO  Rounding Hospitalist

## 2019-05-28 ENCOUNTER — OUTSIDE FACILITY SERVICE (OUTPATIENT)
Dept: PULMONOLOGY | Facility: CLINIC | Age: 64
End: 2019-05-28

## 2019-05-28 PROBLEM — R78.81 BACTEREMIA: Status: RESOLVED | Noted: 2019-01-01 | Resolved: 2019-01-01

## 2019-05-28 PROBLEM — J80 ARDS (ADULT RESPIRATORY DISTRESS SYNDROME) (HCC): Status: RESOLVED | Noted: 2019-01-01 | Resolved: 2019-01-01

## 2019-05-28 PROBLEM — F51.01 PRIMARY INSOMNIA: Status: ACTIVE | Noted: 2019-01-01

## 2019-05-28 PROCEDURE — 99233 SBSQ HOSP IP/OBS HIGH 50: CPT | Performed by: INTERNAL MEDICINE

## 2019-05-28 NOTE — PROGRESS NOTES
Called Dr. Alondra Tanner answering service at [phone] regarding Pulmonology standpoint on transferring pt to [facility]. Spoke with Dr. Ha Palmer and he said as long as an EMS service can transport patient safely and can maintain O2 sat's during the transfer that he is okay with transferring the patient. Dr. Cedric Cain notified of this. Will standby for further orders.  Electronically signed by Burton Jimenez RN on 5/28/2019 at 4:28 PM

## 2019-05-28 NOTE — CARE COORDINATION
CM outreached to Colt Gale, [address], to determine patient eligibility to return to [facility]. Awaiting call   back.      Electronically signed by Susu Stover RN on 5/28/2019 at 10:07 AM

## 2019-05-28 NOTE — CARE COORDINATION
CM contacted [facility] to ascertain ability to transport patient given high flow 02 requirements. CM spoke with Colt Saha, to determine conditions needed for transport. EMS cannot meet current highflow 02 needs by ambulance, however, could transport patient via BIPAP. That said, EMS requesting equivalent BIPAP settings per Pulmonology for transport. PAMELA has updated Provider, charge RN, and Godfrey Musa at [facility]. PAMELA has placed offer letter from [facility] in soft chart. CM will need to f/u on transport time/conditions.     Electronically signed by Breanna Henao RN on 5/28/2019 at 4:53 PM

## 2019-05-28 NOTE — PROGRESS NOTES
Nutrition Assessment    Type and Reason for Visit: Reassess    Nutrition Recommendations: continue current POC    Nutrition Assessment: Pt remains at nutritional risk d/t decreased po intake and 5.8% weight loss in 1 week. Malnutrition Assessment:  · Malnutrition Status: Meets the criteria for moderate malnutrition  · Context: Acute illness or injury  · Findings of the 6 clinical characteristics of malnutrition (Minimum of 2 out of 6 clinical characteristics is required to make the diagnosis of moderate or severe Protein Calorie Malnutrition based on AND/ASPEN Guidelines):  1. Energy Intake-Less than or equal to 75% of estimated energy requirement, Greater than or equal to 7 days    2. Weight Loss-5% loss or greater, in 1 week  3. Fat Loss-Mild subcutaneous fat loss,    4. Muscle Loss-Mild muscle mass loss,    5. Fluid Accumulation-No significant fluid accumulation, Extremities  6.  Strength-Not measured    Nutrition Risk Level: Moderate    Nutrient Needs:  · Estimated Daily Total Kcal: 913-1162 kcals/day  · Estimated Daily Protein (g):  g/PRO/day  · Estimated Daily Total Fluid (ml/day): 913-1162 mL/day    Nutrition Diagnosis:   · Problem: Inadequate oral intake  · Etiology: related to Acute injury/trauma     Signs and symptoms:  as evidenced by Intake 25-50%    Objective Information:  · Nutrition-Focused Physical Findings: wellnourished appearing female.   Aware marquez had bariatric surgery  · Wound Type: None  · Current Nutrition Therapies:  · Oral Diet Orders: Dental Soft, No Added Salt (3-4gm)   · Oral Diet intake: 1-25%  · Oral Nutrition Supplement (ONS) Orders: Low Calorie High Protein Supplement  · ONS intake: 1-25%     · Anthropometric Measures:  · Ht: 5' 1.02\" (155 cm)   · Current Body Wt: 168 lb 6 oz (76.4 kg)  · Admission Body Wt: 178 lb 9 oz (81 kg)  · Usual Body Wt: 254 lb (115.2 kg)(7/2016)  · Ideal Body Wt: 105 lb (47.6 kg), % Ideal Body 170.3%  · BMI Classification: BMI 30.0 - 34.9 Obese

## 2019-05-28 NOTE — PROGRESS NOTES
Hospitalist Progress Note  5/28/2019 4:27 PM  Subjective:   Admit Date: 5/17/2019  PCP: No primary care provider on file. Chief Complaint: shortness of breath    Subjective: Patient is denying new complaints. Tapering high-flow oxygen from 100%. Has been accepted to [facility] but EMS is unable to accommodate high-flow oxygen delivery. History is otherwise unchanged. Cumulative Hospital History:   5-27: PT/OT consults. Pulmonology workup still pending. 5-28: Accepted to [facility] but unable to safely transport until she is weaned down on oxygen delivery. ROS: 14 point review of systems is negative except as specifically addressed above.     DIET DENTAL SOFT; No Added Salt (3-4 GM)  Dietary Nutrition Supplements: Low Calorie High Protein Supplement  Dietary Nutrition Supplements: Standard High Calorie Oral Supplement    Intake/Output Summary (Last 24 hours) at 5/28/2019 1627  Last data filed at 5/28/2019 1456  Gross per 24 hour   Intake 1376 ml   Output 1650 ml   Net -274 ml     Medications:  Current Facility-Administered Medications   Medication Dose Route Frequency Provider Last Rate Last Dose    enoxaparin (LOVENOX) injection 70 mg  1 mg/kg Subcutaneous BID Parke Moise, DO   70 mg at 05/28/19 8857    docusate sodium (COLACE) capsule 100 mg  100 mg Oral Daily Tempie DAXA Dominguez   100 mg at 05/28/19 0825    methylPREDNISolone sodium (SOLU-MEDROL) injection 40 mg  40 mg Intravenous Q6H Gregor Flower MD   40 mg at 05/28/19 1611    FLUoxetine (PROZAC) capsule 20 mg  20 mg Oral Daily Parke Moise, DO   20 mg at 05/28/19 0820    clonazePAM (KLONOPIN) tablet 0.5 mg  0.5 mg Oral Nightly Parke Moise, DO   0.5 mg at 05/27/19 2233    bisacodyl (DULCOLAX) suppository 10 mg  10 mg Rectal Daily PRN Parke Moise, DO   10 mg at 05/23/19 1840    ALPRAZolam [place]) tablet 0.25 mg  0.25 mg Oral TID PRN Parke Moise, DO   0.25 mg at 05/27/19 2251    atorvastatin (LIPITOR) tablet 20 mg  20 mg Oral Daily Merlinda Penning, DO   20 mg at 83/48/66 5888    folic acid (FOLVITE) tablet 1 mg  1 mg Oral Daily Merlinda Penning, DO   1 mg at 05/28/19 6013    HYDROcodone-acetaminophen (NORCO)  MG per tablet 1 tablet  1 tablet Oral Q6H PRN Merlinda Penning, DO   1 tablet at 05/28/19 1053    metoprolol tartrate (LOPRESSOR) tablet 25 mg  25 mg Oral BID Merlinda Penning, DO   25 mg at 05/28/19 5708    nystatin (MYCOSTATIN) 457408 UNIT/ML suspension 500,000 Units  5 mL Oral 4 times per day Merlinda Penning, DO   500,000 Units at 05/28/19 1227    midazolam (VERSED) injection 1 mg  1 mg Intravenous Q2H PRN Merlinda Penning, DO   1 mg at 05/19/19 2015    rOPINIRole (REQUIP) tablet 1 mg  1 mg Per NG tube TID Merlinda Penning, DO   1 mg at 05/28/19 1333    albuterol (PROVENTIL) nebulizer solution 2.5 mg  2.5 mg Nebulization Q4H PRN Merlinda Penning, DO        ipratropium-albuterol (DUONEB) nebulizer solution 1 ampule  1 ampule Inhalation Q4H Merlinda Penning, DO   1 ampule at 05/28/19 1506    sodium chloride flush 0.9 % injection 10 mL  10 mL Intravenous 2 times per day Merlinda Penning, DO   10 mL at 05/28/19 0825    sodium chloride flush 0.9 % injection 10 mL  10 mL Intravenous PRN Merlinda Penning, DO   10 mL at 05/24/19 2116    acetaminophen (TYLENOL) suppository 650 mg  650 mg Rectal Q4H PRN Merlinda Penning, DO        morphine (PF) injection 2 mg  2 mg Intravenous Q1H PRN Merlinda Penning, DO   2 mg at 05/21/19 1230        Labs:     Recent Labs     05/26/19  0135 05/27/19  0125   WBC 11.2* 8.5   RBC 3.61* 3.49*   HGB 10.6* 10.4*   HCT 33.9* 32.9*   MCV 93.9 94.3   MCH 29.4 29.8   MCHC 31.3* 31.6*    172     Recent Labs     05/26/19  0135 05/26/19  1045 05/27/19  0125     --  144   K 4.5 4.0 4.2   ANIONGAP 10  --  11     --  104   CO2 30*  --  29   BUN 17  --  16   CREATININE 0.5  --  0.5   GLUCOSE 118*  --  144*   CALCIUM 8.2*  -- 8.4*     No results for input(s): MG, PHOS in the last 72 hours. No results for input(s): AST, ALT, ALB, BILITOT, ALKPHOS, ALB in the last 72 hours. ABGs:No results for input(s): PH, PO2, PCO2, HCO3, BE, O2SAT in the last 72 hours. Troponin T: No results for input(s): TROPONINI in the last 72 hours. INR: No results for input(s): INR in the last 72 hours. Lactic Acid: No results for input(s): LACTA in the last 72 hours.     Objective:   Vitals: BP (!) 110/58   Pulse 67   Temp 97.6 °F (36.4 °C) (Temporal)   Resp 20   Ht 5' 1.02\" (1.55 m)   Wt 168 lb 6.4 oz (76.4 kg)   SpO2 (!) 89%   BMI 31.79 kg/m²   24HR INTAKE/OUTPUT:      Intake/Output Summary (Last 24 hours) at 5/28/2019 1627  Last data filed at 5/28/2019 1456  Gross per 24 hour   Intake 1376 ml   Output 1650 ml   Net -274 ml     General appearance: alert and cooperative with exam  HEENT: atraumatic, eyes with clear conjunctiva and normal lids, pupils and irises normal, external ears and nose are normal, lips normal  Neck without masses, lympadenopathy, bruit, thyroid normal  Lungs: no increased work of breathing, diminished breath sounds bilaterally and wheezes with breath sounds largely obscured by high-flow oxygen system  Heart: regular rate and rhythm, S1, S2 normal, no murmur, click, rub or gallop  Abdomen: soft, non-tender; bowel sounds normal; no masses,  no organomegaly  Extremities: edema trace bilaterally, modified Az's negative  Neurologic: no focal neurologic deficits, normal sensation, alert and oriented, affect and mood appropriate  Skin: no rashes, nodules    Assessment and Plan:   Principal Problem:    Acute on chronic respiratory failure with hypoxia and hypercapnia (HCC)  Active Problems:    Left lower lobe pneumonia (HCC)    Chronic atrial fibrillation (HCC)    Personal history of PE (pulmonary embolism)    COPD (chronic obstructive pulmonary disease) (HCC)    Chronic diastolic congestive heart failure ([facility] [address]    Palliative care patient    Moderate malnutrition ([facility] [address]    Primary insomnia  Resolved Problems:    ARDS (adult respiratory distress syndrome) (HCC)    Bacteremia    Continue high-flow supplemental oxygen, titrated to maintain oxygenation. Continue methylprednisolone 40 mg IV q6h. Continue DuoNebs. Pulmonology on case, appreciate continuing recommendations. Patient has been accepted to [facility] but EMS is unable to provide high-flow oxygen for transportation. As she is weaned further, would appreciate input from pulmonology on possible transport via biPAP.     Advance Directive: Full Code    DVT prophylaxis: enoxaparin    Discharge planning: To Kris Haley DO  Rounding Hospitalist

## 2019-05-28 NOTE — PROGRESS NOTES
Called Dr. Niko Trivedi office with Pulmonology and left message to nurse for a callback regarding Pulmonology okay with pt to d/c. Awaiting callback.  Electronically signed by Noe Obrien RN on 5/28/2019 at 12:07 PM

## 2019-05-28 NOTE — PLAN OF CARE
Nutrition Problem: Inadequate oral intake  Intervention: Food and/or Nutrient Delivery: Continue current diet, Continue current ONS  Nutritional Goals: New Goal: Pt will consume 50% or more of meals and supplements with no s/s intolerance

## 2019-05-28 NOTE — PROGRESS NOTES
Pulmonary and Critical Care Progress Note 400 [facility]    Patient: Joshua Payment  1955   MR# [id number]   Acct# [de-identified]  05/28/19   8:03 AM  Referring Provider: Luis Carlos Foster DO    Chief Complaint: Hypoxemic respiratory failure. Interval history: The patient is resting in bed on heated hi-flow 85%, 40L. She is c/o of constipation today - will defer to attending. She has had to have her O2 adjusted overnight 2' desaturations. No family at bedside. No other aggravating or alleviating factors or associated symptoms. enoxaparin 1 mg/kg Subcutaneous BID   methylPREDNISolone 40 mg Intravenous Q6H   FLUoxetine 20 mg Oral Daily   clonazePAM 0.5 mg Oral Nightly   atorvastatin 20 mg Oral Daily   folic acid 1 mg Oral Daily   metoprolol tartrate 25 mg Oral BID   nystatin 5 mL Oral 4 times per day   rOPINIRole 1 mg Per NG tube TID   ipratropium-albuterol 1 ampule Inhalation Q4H   sodium chloride flush 10 mL Intravenous 2 times per day       Review of Systems:   Review of Systems   Constitutional: Positive for fatigue. HENT: Negative. Eyes: Negative. Respiratory: Positive for shortness of breath. Cardiovascular: Positive for leg swelling. Gastrointestinal: Positive for constipation. Genitourinary: Negative. Musculoskeletal: Negative. Skin: Negative. Neurological: Positive for weakness. Psychiatric/Behavioral: Negative. Physical Exam:  Blood pressure 99/62, pulse 70, temperature 97.8 °F (36.6 °C), resp. rate 20, height 5' 1.02\" (1.55 m), weight 155 lb 3.2 oz (70.4 kg), SpO2 91 %. Intake/Output Summary (Last 24 hours) at 5/28/2019 0803  Last data filed at 5/28/2019 0400  Gross per 24 hour   Intake 1696 ml   Output 1600 ml   Net 96 ml     Physical Exam   Constitutional: She is oriented to person, place, and time. She is a chronically ill-appearing white female. HENT:   Head: Normocephalic and atraumatic. High flow nasal cannula is in place.    Eyes: Pupils are equal, round, and reactive to light. EOM are normal.   Neck: Normal range of motion. Neck supple. Cardiovascular: Normal rate, regular rhythm and normal heart sounds. Pulmonary/Chest:   Bilateral crackles are present. Abdominal: Soft. Musculoskeletal: She exhibits edema. She has 2+ to 3+ edema of the calves. Neurological: She is alert and oriented to person, place, and time. Skin: Skin is warm and dry. No rash noted. Psychiatric: She has a normal mood and affect. Nursing note and vitals reviewed. Recent Labs     05/26/19  0135 05/27/19  0125   WBC 11.2* 8.5   RBC 3.61* 3.49*   HGB 10.6* 10.4*   HCT 33.9* 32.9*    172   MCV 93.9 94.3   MCH 29.4 29.8   MCHC 31.3* 31.6*   RDW 15.1* 15.1*      Recent Labs     05/25/19  1411 05/26/19  0135 05/26/19  1045 05/27/19  0125   NA  --  142  --  144   K 4.1 4.5 4.0 4.2   CL  --  102  --  104   CO2  --  30*  --  29   BUN  --  17  --  16   CREATININE  --  0.5  --  0.5   CALCIUM  --  8.2*  --  8.4*   GLUCOSE  --  118*  --  144*   PHART 7.480*  --  7.520*  --    NLR6HMY 46.0*  --  42.0  --    PO2ART 52.0*  --  47.0*  --    ZKK0ACS 34.3*  --  34.3*  --    [id number] 87.6*  --  85.4*  --    BEART 9.6*  --  10.4*  --       Recent Labs     05/26/19  1348   BC No Growth to date. Any change in status will be called. Radiograph: None today. My radiograph interpretation: no new chest x-ray. Pulmonary Assessment:    1. Acute superimposed on chronic respiratory failure with hypoxemia. 2. Interstitial lung disease with poorly fibrosis and bronchiectasis. This is not a classic UIP picture but it could represent UIP and associated IPF. 3. Pulmonary hypertension by echocardiogram.  This certainly could relate to chronic hypoxemia associated with interstitial lung disease and also could relate to the diastolic dysfunction also noted on her echocardiogram.  4. Diastolic dysfunction.     Recommend:   · Continue heated hi-flow and wean as tolerated for O2 sat 90-94%  · Continue duonebs  · Continue solumedrol 40mg IV q 6 hrs  · IS/flutter     Electronically signed by Oretha Barthel on 5/28/2019 at 8:03 AM  Physician's substantive portion:  Subjective: The patient remains on high flow nasal cannula with FiO2 of 90%. She is awake and alert. Objective: Bilateral crackles are present on chest exam.  Assessment/recommendation: The patient certainly may have had an episode of acute pneumonia recently but her current issues appear to be related to severe underlying interstitial lung disease. She clearly has areas of fibrosis with traction bronchiectasis and even some areas of honeycombing but the fact that she has significant upper lobe disease with groundglass changes and also what appears to be some areas of air trapping would make this not a typical UIP picture. She still could have UIP and IPF but again the radiographic features are not classic for this. If she were to have had an exacerbation of IPF, the treatment is based antibiotics, steroids, and supportive care which she is receiving. I don't think she be a candidate for in a fibrotic therapy and that would not be indicated for an acute exacerbation anyway. There was some question about transfer to LTAC in view the fact that she is on 90% FiO2 I don't think she is stable enough for transfer. This also is taking 2 account the fact that when she was transferred from the outlying hospital here to [facility] she had to be emergently intubated en route. I did discuss my feelings that the patient is not stable for transfer with her nurse and with Phyllis Cole the . I did have some advance care planning end-of-life discussions with the patient particularly regarding the fact if she required reintubation and mechanical ventilatory support the likelihood of weaning her successfully again is going to be extremely low. I'll be glad to discuss this further with family members when they're available.  Again she is not stable for transfer at this time. I have seen and examined patient personally, performing a face-to-face diagnostic evaluation with plan of care reviewed and developed with APRN. I have addended and/or modified the above history of present illness, physical examination, and assessment and plan to reflect my findings and impressions. Essential elements of the care plan were discussed with APRN above. Agree with findings and assessment/plan as documented above.     Electronically signed by Chano Langston MD on 5/28/19 at 4:40 PM

## 2019-05-29 ENCOUNTER — OUTSIDE FACILITY SERVICE (OUTPATIENT)
Dept: PULMONOLOGY | Facility: CLINIC | Age: 64
End: 2019-05-29

## 2019-05-29 PROCEDURE — 99233 SBSQ HOSP IP/OBS HIGH 50: CPT | Performed by: INTERNAL MEDICINE

## 2019-05-29 NOTE — PROGRESS NOTES
Pulmonary and Critical Care Progress Note 400 [facility]    Patient: Isai Olmstead  1955   MR# [id number]   Acct# [de-identified]  05/29/19   10:44 AM  Referring Provider: Devonda Olszewski, DO    Chief Complaint: Hypoxemic respiratory failure. Interval history: The patient has been placed on BiPAP earlier this morning due to desaturations. She is awaiting transfer to [facility] when she's more stable. Her family is at bedside. No other aggravating or alleviating factors or associated symptoms. enoxaparin 1 mg/kg Subcutaneous BID   docusate sodium 100 mg Oral Daily   methylPREDNISolone 40 mg Intravenous Q6H   FLUoxetine 20 mg Oral Daily   clonazePAM 0.5 mg Oral Nightly   atorvastatin 20 mg Oral Daily   folic acid 1 mg Oral Daily   metoprolol tartrate 25 mg Oral BID   nystatin 5 mL Oral 4 times per day   rOPINIRole 1 mg Per NG tube TID   ipratropium-albuterol 1 ampule Inhalation Q4H   sodium chloride flush 10 mL Intravenous 2 times per day       Review of Systems:   Review of Systems   Constitutional: Positive for fatigue. HENT: Negative. Eyes: Negative. Respiratory: Positive for shortness of breath. Cardiovascular: Positive for leg swelling. Gastrointestinal: Positive for constipation. Genitourinary: Negative. Musculoskeletal: Negative. Skin: Negative. Neurological: Positive for weakness. Psychiatric/Behavioral: Negative. Physical Exam:  Blood pressure 102/64, pulse 86, temperature 97.8 °F (36.6 °C), temperature source Temporal, resp. rate 18, height 5' 1.02\" (1.55 m), weight 166 lb 1.6 oz (75.3 kg), SpO2 92 %. Intake/Output Summary (Last 24 hours) at 5/29/2019 1044  Last data filed at 5/29/2019 0851  Gross per 24 hour   Intake 896 ml   Output 300 ml   Net 596 ml     Physical Exam   Constitutional: She is oriented to person, place, and time. She is a chronically ill-appearing white female. HENT:   Head: Normocephalic and atraumatic.    High flow nasal cannula is

## 2019-05-29 NOTE — PROGRESS NOTES
Hospitalist Progress Note  5/29/2019 4:43 PM  Subjective:   Admit Date: 5/17/2019  PCP: No primary care provider on file. Chief Complaint: shortness of breath    Subjective: Denies new complaints. More hypoxic earlier in the morning, placed back on biPAP. Awaiting stability for discharge to [facility]. Cumulative Hospital History:   5-27: PT/OT consults. Pulmonology workup still pending. 5-28: Accepted to [facility] but unable to safely transport until she is weaned down on oxygen delivery. 5-29: Desaturation early morning, placed back on biPAP. ROS: 14 point review of systems is negative except as specifically addressed above.     DIET DENTAL SOFT; No Added Salt (3-4 GM)  Dietary Nutrition Supplements: Low Calorie High Protein Supplement  Dietary Nutrition Supplements: Standard High Calorie Oral Supplement    Intake/Output Summary (Last 24 hours) at 5/29/2019 1643  Last data filed at 5/29/2019 1325  Gross per 24 hour   Intake 780 ml   Output 200 ml   Net 580 ml     Medications:  Current Facility-Administered Medications   Medication Dose Route Frequency Provider Last Rate Last Dose    enoxaparin (LOVENOX) injection 70 mg  1 mg/kg Subcutaneous BID Nancylee Cummings, DO   70 mg at 05/29/19 0820    docusate sodium (COLACE) capsule 100 mg  100 mg Oral Daily DAXA Alarcon   100 mg at 05/29/19 3768    methylPREDNISolone sodium (SOLU-MEDROL) injection 40 mg  40 mg Intravenous Q6H Monica Nesbitt MD   40 mg at 05/29/19 1058    FLUoxetine (PROZAC) capsule 20 mg  20 mg Oral Daily Nancylee Cummings, DO   20 mg at 05/29/19 0820    clonazePAM (KLONOPIN) tablet 0.5 mg  0.5 mg Oral Nightly Nancylee Cummings, DO   0.5 mg at 05/28/19 2216    bisacodyl (DULCOLAX) suppository 10 mg  10 mg Rectal Daily PRN Nancylee Cummings, DO   10 mg at 05/23/19 1840    ALPRAZolam Leroy Lord) tablet 0.25 mg  0.25 mg Oral TID PRN Nancylee Cummings, DO   0.25 mg at 05/28/19 2216    atorvastatin (LIPITOR) tablet 20 mg  20 mg Oral Daily Naomi Jarvis, DO   20 mg at 56/34/55 3962    folic acid (FOLVITE) tablet 1 mg  1 mg Oral Daily Naomi Jarvis, DO   1 mg at 05/29/19 0819    HYDROcodone-acetaminophen (NORCO)  MG per tablet 1 tablet  1 tablet Oral Q6H PRN Naomi Jarvis, DO   1 tablet at 05/29/19 6363    metoprolol tartrate (LOPRESSOR) tablet 25 mg  25 mg Oral BID Julee Jarvis, DO   25 mg at 05/29/19 0820    nystatin (MYCOSTATIN) 596056 UNIT/ML suspension 500,000 Units  5 mL Oral 4 times per day Naomi Jarvis, DO   500,000 Units at 05/29/19 1058    midazolam (VERSED) injection 1 mg  1 mg Intravenous Q2H PRN Julee Jarvis, DO   1 mg at 05/19/19 2015    rOPINIRole (REQUIP) tablet 1 mg  1 mg Per NG tube TID Julee Lan, DO   1 mg at 05/29/19 1424    albuterol (PROVENTIL) nebulizer solution 2.5 mg  2.5 mg Nebulization Q4H PRN Julee Jarvis, DO        ipratropium-albuterol (DUONEB) nebulizer solution 1 ampule  1 ampule Inhalation Q4H Julee Jarvis, DO   1 ampule at 05/29/19 1417    sodium chloride flush 0.9 % injection 10 mL  10 mL Intravenous 2 times per day Julee Jarvis, DO   10 mL at 05/29/19 9904    sodium chloride flush 0.9 % injection 10 mL  10 mL Intravenous PRN Julee Lan, DO   10 mL at 05/24/19 2116    acetaminophen (TYLENOL) suppository 650 mg  650 mg Rectal Q4H PRN Julee Jarvis, DO        morphine (PF) injection 2 mg  2 mg Intravenous Q1H PRN Naomi Jarvis, DO   2 mg at 05/21/19 1230        Labs:     Recent Labs     05/27/19  0125 05/29/19  0628   WBC 8.5 11.0*   RBC 3.49* 3.39*   HGB 10.4* 10.3*   HCT 32.9* 32.4*   MCV 94.3 95.6   MCH 29.8 30.4   MCHC 31.6* 31.8*    192     Recent Labs     05/27/19  0125 05/29/19  0628    142   K 4.2 4.0   ANIONGAP 11 10    104   CO2 29 28   BUN 16 20   CREATININE 0.5 0.5   GLUCOSE 144* 113*   CALCIUM 8.4* 8.4*     No results for input(s): MG, PHOS in the last 72 hours.   No results for input(s): AST, ALT, ALB, BILITOT, ALKPHOS, ALB in the last 72 hours. ABGs:No results for input(s): PH, PO2, PCO2, HCO3, BE, O2SAT in the last 72 hours. Troponin T: No results for input(s): TROPONINI in the last 72 hours. INR: No results for input(s): INR in the last 72 hours. Lactic Acid: No results for input(s): LACTA in the last 72 hours.     Objective:   Vitals: /68   Pulse 83   Temp 97.1 °F (36.2 °C) (Temporal)   Resp 20   Ht 5' 1.02\" (1.55 m)   Wt 166 lb 1.6 oz (75.3 kg)   SpO2 (!) 86%   BMI 31.36 kg/m²   24HR INTAKE/OUTPUT:      Intake/Output Summary (Last 24 hours) at 5/29/2019 1643  Last data filed at 5/29/2019 1325  Gross per 24 hour   Intake 780 ml   Output 200 ml   Net 580 ml     General appearance: alert and cooperative with exam  HEENT: atraumatic, eyes with clear conjunctiva and normal lids, pupils and irises normal, external ears and nose are normal, lips normal  Neck without masses, lympadenopathy, bruit, thyroid normal  Lungs: no increased work of breathing, diminished breath sounds bilaterally and wheezes with breath sounds largely obscured by high-flow oxygen system  Heart: regular rate and rhythm, S1, S2 normal, no murmur, click, rub or gallop  Abdomen: soft, non-tender; bowel sounds normal; no masses,  no organomegaly  Extremities: edema trace bilaterally, modified Az's negative  Neurologic: no focal neurologic deficits, normal sensation, alert and oriented, affect and mood appropriate  Skin: no rashes, nodules    Assessment and Plan:   Principal Problem:    Acute on chronic respiratory failure with hypoxia and hypercapnia (HCC)  Active Problems:    Left lower lobe pneumonia (HCC)    Chronic atrial fibrillation (HCC)    Personal history of PE (pulmonary embolism)    COPD (chronic obstructive pulmonary disease) (HCC)    Chronic diastolic congestive heart failure (HCC)    Palliative care patient    Moderate malnutrition ([facility] [address]    Primary insomnia  Resolved Problems: ARDS (adult respiratory distress syndrome) (HCC)    Bacteremia    Continue high-flow supplemental oxygen, titrated to maintain oxygenation. Continue methylprednisolone 40 mg IV q6h. Continue DuoNebs. Pulmonology on case, appreciate continuing recommendations. Patient has been accepted to [facility]. Awaiting stability for discharge.     Advance Directive: Full Code    DVT prophylaxis: enoxaparin    Discharge planning: To DO Vaibhav Miner [facility] Hospitalist

## 2019-05-29 NOTE — PROGRESS NOTES
0258 patient sat on HF 40 lpm 90% was 77% increased FIO2 to 100% and gave patient a breathing treatment rechecked sat after treatment it was 80%. Placed patient on BIPAP 12/6 with 15 lpm oxygen sat came up to 90%.

## 2019-05-30 ENCOUNTER — OUTSIDE FACILITY SERVICE (OUTPATIENT)
Dept: PULMONOLOGY | Facility: CLINIC | Age: 64
End: 2019-05-30

## 2019-05-30 PROCEDURE — 99233 SBSQ HOSP IP/OBS HIGH 50: CPT | Performed by: INTERNAL MEDICINE

## 2019-05-30 NOTE — PROGRESS NOTES
Visited with pt's niece to provide support and spiritual care. Provided spiritual care with sustaining presence, nurtured hope, and lunch ticket for niece. Niece was very grateful for assistance and spiritual care.     Electronically signed by Karey Umaña on 5/30/2019 at 9:36 AM

## 2019-05-30 NOTE — PROGRESS NOTES
5095 Was call to pt. Room to place pt. On Bipap. Pt was on toliet placed pt. On Bipap 12/6 @ 15 LPM Pt. Sat dropped to 75% placed pt back on % @ 50 LPM.  DR. Wray said to set BIPAP to 20/10. Pt.is back on HF @ 100% @ 50 LPM for now.

## 2019-05-30 NOTE — PLAN OF CARE
Problem: Risk for Impaired Skin Integrity  Goal: Tissue integrity - skin and mucous membranes  Description  Structural intactness and normal physiological function of skin and  mucous membranes.   5/30/2019 1101 by Tor Bentley RN  Outcome: Ongoing  5/30/2019 0116 by Leti Pink RN  Outcome: Ongoing     Problem: Falls - Risk of:  Goal: Will remain free from falls  Description  Will remain free from falls  5/30/2019 1101 by Tor Bentley RN  Outcome: Ongoing  5/30/2019 0116 by Leti Pink RN  Outcome: Ongoing  Goal: Absence of physical injury  Description  Absence of physical injury  5/30/2019 1101 by Tor Bentley RN  Outcome: Ongoing  5/30/2019 0116 by Leti Pink RN  Outcome: Ongoing     Problem: Nutrition  Goal: Optimal nutrition therapy  Description  Nutrition Problem: Inadequate oral intake  Intervention: Food and/or Nutrient Delivery: Continue current diet, Start ONS  Nutritional Goals: New Goal: Pt will consume 50% or more of meals and supplements with no s/s intolerance           5/30/2019 1101 by Tor Bentley RN  Outcome: Ongoing  5/30/2019 0116 by Leti Pink RN  Outcome: Ongoing     Problem: Pain:  Goal: Pain level will decrease  Description  Pain level will decrease  5/30/2019 1101 by Tor Bentley RN  Outcome: Ongoing  5/30/2019 0116 by Leti Pink RN  Outcome: Ongoing  Goal: Control of acute pain  Description  Control of acute pain  5/30/2019 1101 by Tor Bentley RN  Outcome: Ongoing  5/30/2019 0116 by Leti Pink RN  Outcome: Ongoing  Goal: Control of chronic pain  Description  Control of chronic pain  5/30/2019 1101 by Tor Bentley RN  Outcome: Ongoing  5/30/2019 0116 by Leti Pink RN  Outcome: Ongoing     Problem: Pain:  Goal: Control of acute pain  Description  Control of acute pain  5/30/2019 1101 by Tor Bentley RN  Outcome: Ongoing  5/30/2019 0116 by Leti Pink RN  Outcome: Ongoing     Problem: Pain:  Goal: Control of chronic pain  Description  Control of chronic pain  5/30/2019 1101 by Bethany Weiss RN  Outcome: Ongoing  5/30/2019 0116 by Abbey Ferguson RN  Outcome: Ongoing

## 2019-05-30 NOTE — PROGRESS NOTES
Hospitalist Progress Note  5/30/2019 9:09 AM  Subjective:   Admit Date: 5/17/2019  PCP: No primary care provider on file. Chief Complaint: shortness of breath    Subjective: More short of breath today. Saturating 86% or lower on high-flow oxygen. History is otherwise unchanged. Cumulative Hospital History:   5-27: PT/OT consults. Pulmonology workup still pending. 5-28: Accepted to [facility] but unable to safely transport until she is weaned down on oxygen delivery. 5-29: Desaturation early morning, placed back on biPAP. CXR stable. 5-30: More short of breath. WBC up slightly. Still afebrile. Increase biPAP pressures to 20 iPAP, 10 ePAP. ROS: 14 point review of systems is negative except as specifically addressed above.     DIET DENTAL SOFT; No Added Salt (3-4 GM)  Dietary Nutrition Supplements: Low Calorie High Protein Supplement  Dietary Nutrition Supplements: Standard High Calorie Oral Supplement    Intake/Output Summary (Last 24 hours) at 5/30/2019 0909  Last data filed at 5/30/2019 5906  Gross per 24 hour   Intake 960 ml   Output 200 ml   Net 760 ml     Medications:  Current Facility-Administered Medications   Medication Dose Route Frequency Provider Last Rate Last Dose    enoxaparin (LOVENOX) injection 70 mg  1 mg/kg Subcutaneous BID Ashlie Farnsworth DO   70 mg at 05/29/19 1952    docusate sodium (COLACE) capsule 100 mg  100 mg Oral Daily DAXA Coppola   100 mg at 05/29/19 4522    methylPREDNISolone sodium (SOLU-MEDROL) injection 40 mg  40 mg Intravenous Q6H Danuta Vicente MD   40 mg at 05/30/19 6915    FLUoxetine (PROZAC) capsule 20 mg  20 mg Oral Daily Ashlie Farnsworth DO   20 mg at 05/29/19 0820    clonazePAM (KLONOPIN) tablet 0.5 mg  0.5 mg Oral Nightly Ashlie Farnsworth DO   0.5 mg at 05/29/19 2138    bisacodyl (DULCOLAX) suppository 10 mg  10 mg Rectal Daily PRN Ashlie Farnsworth DO   10 mg at 05/23/19 1840    ALPRAZolam (XANAX) tablet 0.25 mg  0.25 mg Oral TID PRN GLUCOSE 113* 120*   CALCIUM 8.4* 8.6*     No results for input(s): MG, PHOS in the last 72 hours. No results for input(s): AST, ALT, ALB, BILITOT, ALKPHOS, ALB in the last 72 hours. ABGs:No results for input(s): PH, PO2, PCO2, HCO3, BE, O2SAT in the last 72 hours. Troponin T: No results for input(s): TROPONINI in the last 72 hours. INR: No results for input(s): INR in the last 72 hours. Lactic Acid: No results for input(s): LACTA in the last 72 hours.     Objective:   Vitals: /68   Pulse 84   Temp 98.5 °F (36.9 °C) (Temporal)   Resp 18   Ht 5' 1.02\" (1.55 m)   Wt 164 lb 12.8 oz (74.8 kg)   SpO2 (!) 87%   BMI 31.11 kg/m²   24HR INTAKE/OUTPUT:      Intake/Output Summary (Last 24 hours) at 5/30/2019 0909  Last data filed at 5/30/2019 5389  Gross per 24 hour   Intake 960 ml   Output 200 ml   Net 760 ml     General appearance: alert and cooperative with exam  HEENT: atraumatic, eyes with clear conjunctiva and normal lids, pupils and irises normal, external ears and nose are normal, lips normal  Neck without masses, lympadenopathy, bruit, thyroid normal  Lungs: no increased work of breathing, diminished breath sounds bilaterally and wheezes with breath sounds largely obscured by high-flow oxygen system, increased work of breathing  Heart: regular rate and rhythm, S1, S2 normal, no murmur, click, rub or gallop  Abdomen: soft, non-tender; bowel sounds normal; no masses,  no organomegaly  Extremities: edema trace bilaterally, modified Az's negative  Neurologic: no focal neurologic deficits, normal sensation, alert and oriented, affect and mood appropriate  Skin: no rashes, nodules    Assessment and Plan:   Principal Problem:    Acute on chronic respiratory failure with hypoxia and hypercapnia (HCC)  Active Problems:    Left lower lobe pneumonia (HCC)    Chronic atrial fibrillation (HCC)    Personal history of PE (pulmonary embolism)    COPD (chronic obstructive pulmonary disease) (HCC)    Chronic

## 2019-05-30 NOTE — PROGRESS NOTES
Pulmonary and Critical Care Progress Note 400 [facility]    Patient: Keesha Davis  1955   MR# [id number]   Acct# [de-identified]  05/30/19   7:53 AM  Referring Provider: Kashmir Pickens DO    Chief Complaint: Hypoxemic respiratory failure. Interval history: The patient is off of BiPAP this morning, on HHF oxygen 50L 100% with a resting sat of 84%. She has dyspnea with speech. She is asking that someone from our group speak to her daughter who is a practicing APRN in [place], [place]. She is awaiting transfer to [facility] when she's more stable. Another family is at bedside. No other aggravating or alleviating factors or associated symptoms. enoxaparin 1 mg/kg Subcutaneous BID   docusate sodium 100 mg Oral Daily   methylPREDNISolone 40 mg Intravenous Q6H   FLUoxetine 20 mg Oral Daily   clonazePAM 0.5 mg Oral Nightly   atorvastatin 20 mg Oral Daily   folic acid 1 mg Oral Daily   metoprolol tartrate 25 mg Oral BID   nystatin 5 mL Oral 4 times per day   rOPINIRole 1 mg Per NG tube TID   ipratropium-albuterol 1 ampule Inhalation Q4H   sodium chloride flush 10 mL Intravenous 2 times per day       Review of Systems:   Review of Systems   Constitutional: Positive for fatigue. HENT: Negative. Eyes: Negative. Respiratory: Positive for shortness of breath. Tachypnea   Cardiovascular: Positive for leg swelling. Genitourinary: Negative. Musculoskeletal: Negative. Skin: Negative. Neurological: Positive for weakness. Psychiatric/Behavioral: Negative. Physical Exam:  Blood pressure 105/68, pulse 84, temperature 98.5 °F (36.9 °C), temperature source Temporal, resp. rate 18, height 5' 1.02\" (1.55 m), weight 164 lb 12.8 oz (74.8 kg), SpO2 (!) 87 %. Intake/Output Summary (Last 24 hours) at 5/30/2019 0753  Last data filed at 5/30/2019 0054  Gross per 24 hour   Intake 840 ml   Output 300 ml   Net 540 ml     Physical Exam   Constitutional: She is oriented to person, place, and time.

## 2019-05-31 ENCOUNTER — OUTSIDE FACILITY SERVICE (OUTPATIENT)
Dept: PULMONOLOGY | Facility: CLINIC | Age: 64
End: 2019-05-31

## 2019-05-31 PROCEDURE — 99233 SBSQ HOSP IP/OBS HIGH 50: CPT | Performed by: INTERNAL MEDICINE

## 2019-05-31 PROCEDURE — 99497 ADVNCD CARE PLAN 30 MIN: CPT | Performed by: INTERNAL MEDICINE

## 2019-05-31 NOTE — CARE COORDINATION
LTAC approved on 5/23 but not stable for dc will require re-eval.    Hospice services discussed by hospitalist & pulmonology, but pt [email or web address] time. Services also explained in length to family.

## 2019-05-31 NOTE — PROGRESS NOTES
Pulmonary and Critical Care Progress Note 400 [facility]    Patient: Jesus Maxwell  1955   MR# [id number]   Acct# [de-identified]  05/31/19   7:40 AM  Referring Provider: Marvel Guillen DO    Chief Complaint: Hypoxemic respiratory failure. Interval history: The patient is on HHF oxygen 50L 90% with a resting sat of 79-82%. She has multiple family members at bedside this morning and is awaiting the arrival of her daughters from out of state She is awaiting transfer to [facility] if she stabilizes. No other aggravating or alleviating factors or associated symptoms. enoxaparin 1 mg/kg Subcutaneous BID   docusate sodium 100 mg Oral Daily   methylPREDNISolone 40 mg Intravenous Q6H   FLUoxetine 20 mg Oral Daily   clonazePAM 0.5 mg Oral Nightly   atorvastatin 20 mg Oral Daily   folic acid 1 mg Oral Daily   metoprolol tartrate 25 mg Oral BID   nystatin 5 mL Oral 4 times per day   rOPINIRole 1 mg Per NG tube TID   ipratropium-albuterol 1 ampule Inhalation Q4H   sodium chloride flush 10 mL Intravenous 2 times per day       Review of Systems:   Review of Systems   Constitutional: Positive for fatigue. HENT: Negative. Eyes: Negative. Respiratory: Positive for shortness of breath. Tachypnea   Cardiovascular: Positive for leg swelling. Genitourinary: Negative. Musculoskeletal: Negative. Skin: Negative. Neurological: Positive for weakness. Psychiatric/Behavioral: Negative. Physical Exam:  Blood pressure 103/66, pulse 95, temperature 97.9 °F (36.6 °C), temperature source Temporal, resp. rate 19, height 5' 1.02\" (1.55 m), weight 161 lb 4.8 oz (73.2 kg), SpO2 (!) 83 %. Intake/Output Summary (Last 24 hours) at 5/31/2019 0740  Last data filed at 5/31/2019 0446  Gross per 24 hour   Intake 1640 ml   Output 302 ml   Net 1338 ml     Physical Exam   Constitutional: She is oriented to person, place, and time. She is a chronically ill-appearing white female.    HENT:   Head: Normocephalic and atraumatic. High flow nasal cannula is in place. Eyes: Pupils are equal, round, and reactive to light. EOM are normal.   Neck: Normal range of motion. Neck supple. Cardiovascular: Regular rhythm and normal heart sounds. Tachycardia present. Pulmonary/Chest: Tachypnea (with speech) noted. Bilateral crackles are present. Abdominal: Soft. Musculoskeletal: She exhibits edema. She has 2+ to 3+ edema of the calves. Neurological: She is alert and oriented to person, place, and time. Generalized weakness   Skin: Skin is warm and dry. No rash noted. Psychiatric: She has a normal mood and affect. Nursing note and vitals reviewed. Recent Labs     05/29/19  0628 05/30/19  0500 05/31/19  0437   WBC 11.0* 12.4* 13.5*   RBC 3.39* 3.69* 3.44*   HGB 10.3* 11.0* 10.1*   HCT 32.4* 35.8* 32.7*    206 202   MCV 95.6 97.0 95.1   MCH 30.4 29.8 29.4   MCHC 31.8* 30.7* 30.9*   RDW 15.3* 15.3* 15.3*      Recent Labs     05/29/19  0628 05/30/19  0500 05/31/19  0437    144 142   K 4.0 4.1 4.1    107 105   CO2 28 26 26   BUN 20 20 23   CREATININE 0.5 0.5 0.5   CALCIUM 8.4* 8.6* 8.5*   GLUCOSE 113* 120* 115*      No results for input(s): BC, LABGRAM, CULTRESP, BFCX in the last 72 hours. Radiograph: None today. My radiograph interpretation: no new chest x-ray. 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 The C   allele protein product is a rare alpha-1-antitrypsin variant that   results in   normal serum concentrations of the protein. The M allele protein   product is a   normal variant. Individuals with this phenotype are not believed to be   at   risk for developing alpha-1-protease inhibitor deficiency-related   hepatic or   pulmonary disease.  Caution in interpretation is advised if the patient   has   been transfused within the previous 21 days. Pulmonary Assessment:    1. Acute superimposed on chronic respiratory failure with hypoxemia. 2. Interstitial lung disease with fibrosis and bronchiectasis. This is not a classic UIP picture but it could represent UIP and associated IPF. 3. Pulmonary hypertension by echocardiogram.  This certainly could relate to chronic hypoxemia associated with interstitial lung disease and also could relate to the diastolic dysfunction also noted on her echocardiogram.  4. Diastolic dysfunction    Recommend:   · Continue heated hi-flow and wean as tolerated for O2 sat 90-94% or BiPAP  · Continue duonebs, IS/flutter   · Continue solumedrol 40mg IV q 6 hrs  · Awaiting transfer to [facility] if she stabilizes enough for transfer. · Alpha 1 results reviewed and noted. · So far, her laboratory studies do not reveal evidence of some specific etiology for her interstitial disease. This makes her prognosis extremely poor. · Dr. Nory Rodarte will round this afternoon when her daughters are expected to be here for family conference.      DAXA Turner

## 2019-05-31 NOTE — PROGRESS NOTES
Pulmonary and Critical Care Progress Note 400 [facility]    Patient: Chris Montana  1955   MR# [id number]   Acct# [de-identified]  05/31/19   3:02 PM  Referring Provider: Fabrizio Moreau DO    Chief Complaint: Hypoxemic respiratory failure. Interval history: The patient is on HHF oxygen 50L 90% with a resting sat of 79-82%. She has multiple family members at bedside this morning and is awaiting the arrival of her daughters from out of state She is awaiting transfer to [facility] if she stabilizes. No other aggravating or alleviating factors or associated symptoms. enoxaparin 1 mg/kg Subcutaneous BID   docusate sodium 100 mg Oral Daily   methylPREDNISolone 40 mg Intravenous Q6H   FLUoxetine 20 mg Oral Daily   clonazePAM 0.5 mg Oral Nightly   atorvastatin 20 mg Oral Daily   folic acid 1 mg Oral Daily   metoprolol tartrate 25 mg Oral BID   nystatin 5 mL Oral 4 times per day   rOPINIRole 1 mg Per NG tube TID   ipratropium-albuterol 1 ampule Inhalation Q4H   sodium chloride flush 10 mL Intravenous 2 times per day       Review of Systems:   Review of Systems   Constitutional: Positive for fatigue. HENT: Negative. Eyes: Negative. Respiratory: Positive for shortness of breath. Tachypnea   Cardiovascular: Positive for leg swelling. Genitourinary: Negative. Musculoskeletal: Negative. Skin: Negative. Neurological: Positive for weakness. Psychiatric/Behavioral: Negative. Physical Exam:  Blood pressure 98/63, pulse 65, temperature 98.9 °F (37.2 °C), temperature source Temporal, resp. rate 18, height 5' 1.02\" (1.55 m), weight 161 lb 4.8 oz (73.2 kg), SpO2 (!) 75 %. Intake/Output Summary (Last 24 hours) at 5/31/2019 1502  Last data filed at 5/31/2019 1421  Gross per 24 hour   Intake 1120 ml   Output 580 ml   Net 540 ml     Physical Exam   Constitutional: She is oriented to person, place, and time. She is a chronically ill-appearing white female.    HENT:   Head: Normocephalic and atraumatic. High flow nasal cannula is in place. Eyes: Pupils are equal, round, and reactive to light. EOM are normal.   Neck: Normal range of motion. Neck supple. Cardiovascular: Regular rhythm and normal heart sounds. Tachycardia present. Pulmonary/Chest: Tachypnea (with speech) noted. Bilateral crackles are present. Abdominal: Soft. Musculoskeletal: She exhibits edema. She has 2+ to 3+ edema of the calves. Neurological: She is alert and oriented to person, place, and time. Generalized weakness   Skin: Skin is warm and dry. No rash noted. Psychiatric: She has a normal mood and affect. Nursing note and vitals reviewed. Recent Labs     05/29/19  0628 05/30/19  0500 05/31/19  0437   WBC 11.0* 12.4* 13.5*   RBC 3.39* 3.69* 3.44*   HGB 10.3* 11.0* 10.1*   HCT 32.4* 35.8* 32.7*    206 202   MCV 95.6 97.0 95.1   MCH 30.4 29.8 29.4   MCHC 31.8* 30.7* 30.9*   RDW 15.3* 15.3* 15.3*      Recent Labs     05/29/19  0628 05/30/19  0500 05/31/19  0437    144 142   K 4.0 4.1 4.1    107 105   CO2 28 26 26   BUN 20 20 23   CREATININE 0.5 0.5 0.5   CALCIUM 8.4* 8.6* 8.5*   GLUCOSE 113* 120* 115*      No results for input(s): BC, LABGRAM, CULTRESP, BFCX in the last 72 hours. Radiograph: None today. My radiograph interpretation: no new chest x-ray. 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 The C   allele protein product is a rare alpha-1-antitrypsin variant that   results in   normal serum concentrations of the protein. The M allele protein   product is a   normal variant. Individuals with this phenotype are not believed to be   at   risk for developing alpha-1-protease inhibitor deficiency-related   hepatic or   pulmonary disease.  Caution in interpretation is advised if the patient   has   been transfused within the previous 21 days. Pulmonary Assessment:    1. Acute superimposed on chronic respiratory failure with hypoxemia. 2. Interstitial lung disease with fibrosis and bronchiectasis. This is not a classic UIP picture but it could represent UIP and associated IPF. 3. Pulmonary hypertension by echocardiogram.  This certainly could relate to chronic hypoxemia associated with interstitial lung disease and also could relate to the diastolic dysfunction also noted on her echocardiogram.  4. Diastolic dysfunction    Recommend:   · Continue heated hi-flow and wean as tolerated for O2 sat 90-94% or BiPAP  · Continue duonebs, IS/flutter   · Continue solumedrol 40mg IV q 6 hrs  · Awaiting transfer to [facility] if she stabilizes enough for transfer. · Alpha 1 results reviewed and noted. · So far, her laboratory studies do not reveal evidence of some specific etiology for her interstitial disease. This makes her prognosis extremely poor. · Dr. David Ortega will round this afternoon when her daughters are expected to be here for family conference. Rashida Sequeira MD  Physician's substantive portion:  Subjective: The patient has continuing problems with hypoxemia despite heated high flow. Objective: She has bilateral crackles on chest exam.  Assessment/recommendation: I had approximately a 20 minute conference with several family members including her daughter regarding advanced care planning. I do not think the patient has any significant reversible chronic lung disease and I do think she has point fibrosis with possibly an IPF variant. I would not recommend intubation and mechanical ventilatory support as I think she will almost certainly not able to be weaned from the ventilator. I recommend comfort measures to treat any symptoms of dyspnea with IV opioids as the treatment of choice.  Again I have these advanced care planning discussions with multiple family members but in particular her daughter for 20 minutes. I have seen and examined patient personally, performing a face-to-face diagnostic evaluation with plan of care reviewed and developed with APRN. I have addended and/or modified the above history of present illness, physical examination, and assessment and plan to reflect my findings and impressions. Essential elements of the care plan were discussed with APRN above. Agree with findings and assessment/plan as documented above.     Electronically signed by Srinivasan Kemp MD on 5/31/19 at 3:02 PM

## 2019-05-31 NOTE — PLAN OF CARE
Nutrition Problem: Inadequate oral intake, Biting/chewing difficulty  Intervention: Food and/or Nutrient Delivery: Continue current diet, Continue current ONS  Nutritional Goals: New Goal: Pt will consume % of meals and oral supplements.

## 2019-05-31 NOTE — PROGRESS NOTES
Nutrition Assessment    Type and Reason for Visit: Reassess    Nutrition Recommendations: Continue POC. Nutrition Assessment: Pt is declining from a nutritional standpoint AEB 9.5% wt loss in 1 week. Malnutrition Assessment:  · Malnutrition Status: Meets the criteria for moderate malnutrition  · Context: Acute illness or injury  · Findings of the 6 clinical characteristics of malnutrition (Minimum of 2 out of 6 clinical characteristics is required to make the diagnosis of moderate or severe Protein Calorie Malnutrition based on AND/ASPEN Guidelines):  1. Energy Intake-Greater than 75% of estimated energy requirement, Unable to assess    2. Weight Loss-7.5% loss or greater, in 1 week  3. Fat Loss-Mild subcutaneous fat loss,    4. Muscle Loss-Mild muscle mass loss,    5. Fluid Accumulation-No significant fluid accumulation, Extremities  6.  Strength-Not measured    Nutrition Risk Level: Moderate    Nutrient Needs:  · Estimated Daily Total Kcal: 913-1162 kcals/day  · Estimated Daily Protein (g):  g/PRO/day  · Estimated Daily Total Fluid (ml/day): 913-1162 mL/day    Nutrition Diagnosis:   · Problem: Inadequate oral intake, Biting/chewing difficulty  · Etiology: related to Acute injury/trauma     Signs and symptoms:  as evidenced by Weight loss greater than or equal to 2% in 1 week    Objective Information:  · Nutrition-Focused Physical Findings: wellnourished appearing female.   Aware marquez had bariatric surgery  · Wound Type: None  · Current Nutrition Therapies:  · Oral Diet Orders: Dental Soft, No Added Salt (3-4gm)   · Oral Diet intake: %  · Oral Nutrition Supplement (ONS) Orders: Standard High Calorie Oral Supplement, Low Calorie High Protein Supplement  · ONS intake: Unable to assess  · Anthropometric Measures:  · Ht: 5' 1.02\" (155 cm)   · Current Body Wt: 161 lb 5 oz (73.2 kg)  · Admission Body Wt: 178 lb 9 oz (81 kg)  · Usual Body Wt: 254 lb (115.2 kg)(7/2016)  · Ideal Body Wt: 105 lb (47.6 kg), % Ideal Body 170.3%  · BMI Classification: BMI 30.0 - 34.9 Obese Class I    Nutrition Interventions:   Continue current diet, Continue current ONS  Continued Inpatient Monitoring    Nutrition Evaluation:   · Evaluation: Goal achieved   · Goals: New Goal: Pt will consume % of meals and oral supplements.      · Monitoring: Supplement Intake, Meal Intake, Weight, Pertinent Labs, Nutrition Progression      Electronically signed by Krista Posey MS, RD, LD on 5/31/19 at 1:47 PM    Contact Number: [phone]

## 2019-05-31 NOTE — PROGRESS NOTES
Occupational Therapy  Spoke with nursing regarding OT/PT orders for therapy. Patient is currently on maximum high flow and having O2 saturation declines with minimal activity such as getting on bed pan. Do not feel patient will be able to tolerate or meaningfully participate in therapy. OT and PT will sign off while physicians/family finalize plans. Please re order therapy when appropriate for mobility and ADL training.   Electronically signed by Orrin Denver, OT on 5/31/2019 at 12:31 PM

## 2019-05-31 NOTE — PROGRESS NOTES
Pt's O2 sat remains at 66-67% on high flow. Nurse called RT for assistance, replied osei flow has already been topped up to 100%. Pt is lying in the bed resting, states:\" feeling the same\".

## 2019-05-31 NOTE — PROGRESS NOTES
Spoke with Dr. Shelley Buck over the phone regarding pt's O2 sat 66%-67% on high flow. Dr. Shelley Buck suggested switching to Bipap may help but family needs to reconsider pt's code status at this point. Pt's daughter will be here around 13:30 for the final decision. Pt is currently on Bipap 30L. O2 sat is back to 90% per RCP.

## 2019-05-31 NOTE — PROGRESS NOTES
Hospitalist Progress Note  5/31/2019 2:34 PM  Subjective:   Admit Date: 5/17/2019  PCP: No primary care provider on file. Chief Complaint: shortness of breath    Subjective: Denies new problems but desaturating down to mid 60%s on high-flow oxygen this morning. Family present this afternoon, had a discussion regarding goals of care. Daughters, who are nurses, believe hospice is appropriate but patient not willing to consider hospice care at this time. History is otherwise unchanged. [facility]:   5-27: PT/OT consults. Pulmonology workup still pending. 5-28: Accepted to [facility] but unable to safely transport until she is weaned down on oxygen delivery. 5-29: Desaturation early morning, placed back on biPAP. CXR stable. 5-30: More short of breath. WBC up slightly. Still afebrile. Increase biPAP pressures to 20 iPAP, 10 ePAP. 5-31: Morphine PRN air hunger. Family meeting with patient today and this weekend. Patient is resistant to the idea of hospice at this time. ROS: 14 point review of systems is negative except as specifically addressed above.     DIET DENTAL SOFT; No Added Salt (3-4 GM)  Dietary Nutrition Supplements: Low Calorie High Protein Supplement  Dietary Nutrition Supplements: Standard High Calorie Oral Supplement    Intake/Output Summary (Last 24 hours) at 5/31/2019 1434  Last data filed at 5/31/2019 1421  Gross per 24 hour   Intake 1120 ml   Output 580 ml   Net 540 ml     Medications:  Current Facility-Administered Medications   Medication Dose Route Frequency Provider Last Rate Last Dose    morphine (PF) injection 2 mg  2 mg Intravenous Q1H PRN Nathan Jiménez DO        enoxaparin (LOVENOX) injection 70 mg  1 mg/kg Subcutaneous BID Jenae Oh DO   70 mg at 05/31/19 0849    docusate sodium (COLACE) capsule 100 mg  100 mg Oral Daily DAXA Cali   100 mg at 05/31/19 0850    methylPREDNISolone sodium (SOLU-MEDROL) injection 40 mg  40 mg Intravenous Q6H Hemalatha Mandujano Alejandra Perez MD   40 mg at 05/31/19 0850    FLUoxetine (PROZAC) capsule 20 mg  20 mg Oral Daily Margurite Byes, DO   20 mg at 05/31/19 4391    clonazePAM (KLONOPIN) tablet 0.5 mg  0.5 mg Oral Nightly Margurite Byes, DO   0.5 mg at 05/30/19 1936    bisacodyl (DULCOLAX) suppository 10 mg  10 mg Rectal Daily PRN Margurite Byjose, DO   10 mg at 05/23/19 1840    ALPRAZolam Lue Cellar) tablet 0.25 mg  0.25 mg Oral TID PRN Margurite Byjose, DO   0.25 mg at 05/30/19 2220    atorvastatin (LIPITOR) tablet 20 mg  20 mg Oral Daily Margurite Byjose, DO   20 mg at 89/55/34 1450    folic acid (FOLVITE) tablet 1 mg  1 mg Oral Daily Margurite Byes, DO   1 mg at 05/31/19 0850    HYDROcodone-acetaminophen (NORCO)  MG per tablet 1 tablet  1 tablet Oral Q6H PRN Margurite Byjose, DO   1 tablet at 05/31/19 0425    metoprolol tartrate (LOPRESSOR) tablet 25 mg  25 mg Oral BID Margurite Byes, DO   25 mg at 05/31/19 9794    nystatin (MYCOSTATIN) 676754 UNIT/ML suspension 500,000 Units  5 mL Oral 4 times per day Margurite Byes, DO   500,000 Units at 05/31/19 1407    midazolam (VERSED) injection 1 mg  1 mg Intravenous Q2H PRN Margurite Byjose, DO   1 mg at 05/19/19 2015    rOPINIRole (REQUIP) tablet 1 mg  1 mg Per NG tube TID Margurite Byjose, DO   1 mg at 05/31/19 1407    albuterol (PROVENTIL) nebulizer solution 2.5 mg  2.5 mg Nebulization Q4H PRN Margurite Byes, DO        ipratropium-albuterol (DUONEB) nebulizer solution 1 ampule  1 ampule Inhalation Q4H Margurite Byjose, DO   1 ampule at 05/31/19 1026    sodium chloride flush 0.9 % injection 10 mL  10 mL Intravenous 2 times per day Margurite Byes, DO   10 mL at 05/31/19 3283    sodium chloride flush 0.9 % injection 10 mL  10 mL Intravenous PRN Margurite Byes, DO   10 mL at 05/31/19 9403    acetaminophen (TYLENOL) suppository 650 mg  650 mg Rectal Q4H PRN Margurite Byjose,             Labs:     Recent Labs 05/29/19 0628 05/30/19  0500 05/31/19 0437   WBC 11.0* 12.4* 13.5*   RBC 3.39* 3.69* 3.44*   HGB 10.3* 11.0* 10.1*   HCT 32.4* 35.8* 32.7*   MCV 95.6 97.0 95.1   MCH 30.4 29.8 29.4   MCHC 31.8* 30.7* 30.9*    206 202     Recent Labs     05/29/19 0628 05/30/19  0500 05/31/19 0437    144 142   K 4.0 4.1 4.1   ANIONGAP 10 11 11    107 105   CO2 28 26 26   BUN 20 20 23   CREATININE 0.5 0.5 0.5   GLUCOSE 113* 120* 115*   CALCIUM 8.4* 8.6* 8.5*     No results for input(s): MG, PHOS in the last 72 hours. No results for input(s): AST, ALT, ALB, BILITOT, ALKPHOS, ALB in the last 72 hours. ABGs:No results for input(s): PH, PO2, PCO2, HCO3, BE, O2SAT in the last 72 hours. Troponin T: No results for input(s): TROPONINI in the last 72 hours. INR: No results for input(s): INR in the last 72 hours. Lactic Acid: No results for input(s): LACTA in the last 72 hours.     Objective:   Vitals: BP 98/63   Pulse 65   Temp 98.9 °F (37.2 °C) (Temporal)   Resp 18   Ht 5' 1.02\" (1.55 m)   Wt 161 lb 4.8 oz (73.2 kg)   SpO2 (!) 75%   BMI 30.45 kg/m²   24HR INTAKE/OUTPUT:      Intake/Output Summary (Last 24 hours) at 5/31/2019 1434  Last data filed at 5/31/2019 1421  Gross per 24 hour   Intake 1120 ml   Output 580 ml   Net 540 ml     General appearance: alert and cooperative with exam  HEENT: atraumatic, eyes with clear conjunctiva and normal lids, pupils and irises normal, external ears and nose are normal, lips normal  Neck without masses, lympadenopathy, bruit, thyroid normal  Lungs: no increased work of breathing, diminished breath sounds bilaterally and wheezes with breath sounds largely obscured by high-flow oxygen system, increased work of breathing  Heart: regular rate and rhythm, S1, S2 normal, no murmur, click, rub or gallop  Abdomen: soft, non-tender; bowel sounds normal; no masses,  no organomegaly  Extremities: edema trace bilaterally, modified Az's negative  Neurologic: no focal neurologic deficits, normal sensation, alert and oriented, affect and mood appropriate  Skin: no rashes, nodules    Assessment and Plan:   Principal Problem:    Acute on chronic respiratory failure with hypoxia and hypercapnia (HCC)  Active Problems:    Left lower lobe pneumonia (HCC)    Chronic atrial fibrillation (HCC)    Personal history of PE (pulmonary embolism)    COPD (chronic obstructive pulmonary disease) (HCC)    Chronic diastolic congestive heart failure (HCC)    Palliative care patient    Moderate malnutrition ([address]    Primary insomnia  Resolved Problems:    ARDS (adult respiratory distress syndrome) (HCC)    Bacteremia    Requiring high flow oxygen at maximal rates. Discontinue continuous pulse oximetry as there is nothing further we can do for the patient's respiratory status. Continue methylprednisolone 40 mg IV q6h. Continue DuoNebs. Patient has been accepted to [facility] but is not stable for discharge. Would like hospice to meet with the patient after her children are in town.     Advance Directive: Full Code    DVT prophylaxis: enoxaparin    Discharge planning: MYNOR Herron DO  Rounding Hospitalist

## 2019-06-01 PROBLEM — I50.32 CHRONIC DIASTOLIC HEART FAILURE (HCC): Status: ACTIVE | Noted: 2019-01-01

## 2019-06-01 NOTE — PROGRESS NOTES
Called answering service trying to reach on call pulmonologist .  Dr Yue Gaines , the on call Pulmonologist was paged via answering service .  Waiting call back

## 2019-06-01 NOTE — DISCHARGE SUMMARY
Lisa Reagan  :  1955  MRN:  [id number]    Admit date:  2019  Discharge date:      Admitting Physician:  Abdoulaye Jackson DO    Advance Directive: Full Code    Consults: pulmonary/intensive care, ID, palliative care, and hospice    Primary Care Physician:  No primary care provider on file. Discharge Diagnoses:  Principal Problem:    Acute on chronic respiratory failure with hypoxia and hypercapnia (HCC)  Active Problems:    Left lower lobe pneumonia (HCC)    Chronic atrial fibrillation (HCC)    Personal history of PE (pulmonary embolism)    COPD (chronic obstructive pulmonary disease) (HCC)    Chronic diastolic congestive heart failure (HCC)    Palliative care patient    Moderate malnutrition ([address]    Primary insomnia  Resolved Problems:    ARDS (adult respiratory distress syndrome) ([facility])    Bacteremia      Significant Diagnostic Studies:   Xr Chest Portable    Result Date: 2019  XR CHEST PORTABLE 2019 12:00 PM HISTORY: Repositioned enteric tube Comparison: 2019 Findings: Repositioned enteric tube with tube appearing in good position. Tip and sidehole are beyond the GE junction. Lines and tubes are otherwise stable. Stable bilateral airspace opacities, most notable in the left base. The cardiomediastinal silhouette and pulmonary vascularity are unchanged. No acute osseous or soft tissue abnormality is noted. Impression: 1. Repositioned enteric tube with tube appearing in good position. Otherwise stable. Signed by Dr Tomasz Mota on 2019 1:23 PM    Xr Chest Portable    Result Date: 2019  XR CHEST PORTABLE 2019 8:30 AM HISTORY: NG tube insertion Comparison: 2019 Findings: New enteric tube with tip coursing below the diaphragm. Tip is beyond the field-of-view. Lines and tubes are otherwise in stable position. Reidentified bilateral airspace opacities, most notable being the consolidation in the left lung base.  Cardiac mediastinal silhouette and pulmonary vasculature and extubated 5-23. Hospital Course:   5-17: Transferred to ICU from [facility] due to respiratory failure. In ICU there with presenting symptoms of dyspnea and syncope, diagnosis of pneumonia LLL, COPD, a-fib, CKD on biPAP with high bleed-in. Failed weaning of biPAP. The patient was intubated en route to [place]. Pulmonology consulted. Cefepime, vancomycin, levofloxacin per sepsis protocol. IV steroids. 5-18: SBTs, tapering IV steroids. 5-19: Opening eyes, attempts to communicate. 5-20: Net intake of 5 L noted by nursing. CXR stable. Blood cultures contaminated, repeat.  5-21: ID consulted, vancomycin discontinued. 5-22: Possible discharge to [facility] 5-23.  5-23: Attempt made to discharge to [facility] but patient improved and was extubated. 5-24: Transferred to med/surg. Feeling better. 5-25: Shortness of breath improved but dyspneic.  5-26: Requiring biPAP overnight, stable. 5-27: PT/OT consults. Pulmonology workup still pending. 5-28: Accepted to [facility] but unable to safely transport until she is weaned down on oxygen delivery. 5-29: Desaturation early morning, placed back on biPAP. CXR stable. 5-30: More short of breath. WBC up slightly. Still afebrile. Increase biPAP pressures to 20 iPAP, 10 ePAP. 5-31: Morphine PRN air hunger. Family meeting with patient today and this weekend. Patient is resistant to the idea of hospice at this time. 6-1: Discussed at length with family, who were agreeable to hospice consult. Explained that the patient had pulmonary fibrosis, was not improving, and that comfort measures were all we could offer her at this point. The family and patient met with hospice coordinator and she was accepted to inpatient hospice center.     Physical Exam:  Vitals: /71   Pulse 91   Temp 99.7 °F (37.6 °C) (Temporal)   Resp 22   Ht 5' 1.02\" (1.55 m)   Wt 172 lb 6.4 oz (78.2 kg)   SpO2 94%   BMI 32.55 kg/m²   24HR INTAKE/OUTPUT:      Intake/Output Summary (Last 24 hours) at 6/1/2019 1700  Last data filed at 6/1/2019 1400  Gross per 24 hour   Intake 880 ml   Output 250 ml   Net 630 ml     General appearance: alert and cooperative with exam  HEENT: atraumatic, eyes with clear conjunctiva and normal lids, pupils and irises normal, external ears and nose are normal, lips normal. Neck without masses, lympadenopathy, bruit, thyroid normal  Lungs: no increased work of breathing, diminished breath sounds bilaterally, wheezes bilaterally and coarse breath sounds  Heart: regular rate and rhythm, S1, S2 normal, no murmur, click, rub or gallop  Abdomen: soft, non-tender; bowel sounds normal; no masses,  no organomegaly  Extremities: edema trace bilaterally, modified Az's negative  Neurologic: no focal neurologic deficits, normal sensation, alert and oriented, affect and mood appropriate  Skin: no jaundice, rashes, or nodules    Discharge Medications:       Amadeo Lazo   Home Medication Instructions [id number]    Printed on:06/01/19 1700   Medication Information                      AKWA TEARS (LACRILUBE) 2-15-83 % opthalmic ointment  Every 4 hours             albuterol (PROVENTIL) (2.5 MG/3ML) 0.083% nebulizer solution  Take 3 mLs by nebulization every 4 hours as needed for Wheezing             ALPRAZolam (XANAX) 0.25 MG tablet  Take 0.25 mg by mouth 3 times daily as needed for Sleep. atorvastatin (LIPITOR) 20 MG tablet  Take 20 mg by mouth daily             bisacodyl (DULCOLAX) 10 MG suppository  Place 1 suppository rectally daily as needed for Constipation             bisacodyl (DULCOLAX) 10 MG suppository  Place 1 suppository rectally daily as needed for Constipation             cefepime (MAXIPIME) infusion  Infuse 2,000 mg intravenously every 8 hours Compound per protocol             chlorhexidine (PERIDEX) 0.12 % solution  Take 15 mLs by mouth 2 times daily for 14 days             clonazePAM (KLONOPIN) 0.5 MG tablet  Take 1 tablet by mouth nightly for 3 days. docusate sodium (COLACE, DULCOLAX) 100 MG CAPS  Take 100 mg by mouth daily             enoxaparin (LOVENOX) 80 MG/0.8ML injection  Inject 0.8 mLs into the skin 2 times daily             famotidine (PEPCID) 20 MG/2ML injection  Infuse 2 mLs intravenously 2 times daily             FLUoxetine (PROZAC) 20 MG/5ML solution  Take by mouth daily             folic acid (FOLVITE) 1 MG tablet  Take 1 mg by mouth daily             furosemide (LASIX) 10 MG/ML injection  Infuse 2 mLs intravenously daily             gabapentin (NEURONTIN) 300 MG capsule  1 capsule by Per NG tube route 3 times daily for 30 days. HYDROcodone-acetaminophen (NORCO)  MG per tablet  Take 1 tablet by mouth every 6 hours as needed for Pain.             ipratropium-albuterol (DUONEB) 0.5-2.5 (3) MG/3ML SOLN nebulizer solution  Inhale 3 mLs into the lungs every 4 hours             levofloxacin (LEVAQUIN) 500 MG/100ML SOLN  Infuse 150 mLs intravenously every 24 hours             methylPREDNISolone sodium (SOLU-MEDROL) 40 MG injection  Infuse 0.5 mLs intravenously every 12 hours             metoprolol tartrate (LOPRESSOR) 25 MG tablet  Take 25 mg by mouth 2 times daily             midazolam (VERSED) 2 MG/2ML SOLN injection  Infuse 1 mL intravenously every 2 hours as needed (anxiety) for up to 30 days. nystatin (MYCOSTATIN) 474952 UNIT/ML suspension  Take 5 mLs by mouth 4 times daily             polyvinyl alcohol (LIQUIFILM TEARS) 1.4 % ophthalmic solution  Place 1 drop into both eyes every 4 hours             propofol 1000 MG/100ML injection  Infuse 810 mcg/min intravenously continuous             rOPINIRole (REQUIP) 1 MG tablet  Take 1 mg by mouth 2 times daily             vancomycin (VANCOCIN) 1-5 GM/200ML-% SOLN  Infuse 200 mLs intravenously Q18H                 Discharge Instructions: Follow up with No primary care provider on file. per hospice protocol. Take medications as directed.   Resume activity as

## 2019-06-02 PROBLEM — K59.04 CHRONIC IDIOPATHIC CONSTIPATION: Chronic | Status: ACTIVE | Noted: 2019-01-01

## 2019-06-04 ASSESSMENT — ENCOUNTER SYMPTOMS
SHORTNESS OF BREATH: 1
EYES NEGATIVE: 1

## 2019-06-18 LAB
FUNGUS (MYCOLOGY) CULTURE: NORMAL
KOH PREP: NORMAL

## 2019-07-03 LAB
AFB CULTURE (MYCOBACTERIA): NORMAL
AFB SMEAR: NORMAL

## 2020-04-07 NOTE — PLAN OF CARE
Problem: Falls - Risk of:  Goal: Will remain free from falls  Description  Will remain free from falls  5/25/2019 0301 by Alyssa West LPN  Outcome: Met This Shift  5/24/2019 2314 by Alyssa West LPN  Outcome: Met This Shift  Goal: Absence of physical injury  Description  Absence of physical injury  5/25/2019 0301 by Alyssa West LPN  Outcome: Met This Shift  5/24/2019 2314 by Alyssa West LPN  Outcome: Met This Shift     Problem: Risk for Impaired Skin Integrity  Goal: Tissue integrity - skin and mucous membranes  Description  Structural intactness and normal physiological function of skin and  mucous membranes.   5/25/2019 0301 by Alyssa West LPN  Outcome: Ongoing  5/24/2019 2314 by Alyssa West LPN  Outcome: Ongoing     Problem: Nutrition  Goal: Optimal nutrition therapy  Description  Nutrition Problem: Inadequate oral intake  Intervention: Food and/or Nutrient Delivery: Continue current diet, Start ONS  Nutritional Goals: New Goal: Pt will consume 50% or more of meals and supplements with no s/s intolerance           5/25/2019 0301 by Alyssa West LPN  Outcome: Ongoing  5/24/2019 2314 by Alyssa West LPN  Outcome: Ongoing  5/24/2019 1423 by Shari Liang RD, LD  Outcome: Ongoing  5/24/2019 1423 by Shari Liang RD, LD  Outcome: Ongoing     Problem: Pain:  Goal: Pain level will decrease  Description  Pain level will decrease  5/25/2019 0301 by Alyssa West LPN  Outcome: Ongoing  5/24/2019 2314 by Alyssa West LPN  Outcome: Ongoing  Goal: Control of acute pain  Description  Control of acute pain  5/25/2019 0301 by Alyssa West LPN  Outcome: Ongoing  5/24/2019 2314 by Alyssa West LPN  Outcome: Ongoing  Goal: Control of chronic pain  Description  Control of chronic pain  5/25/2019 0301 by Alyssa West LPN  Outcome: Ongoing  5/24/2019 2314 by Alyssa West LPN  Outcome: Ongoing DISCHARGE